# Patient Record
Sex: FEMALE | Race: WHITE | NOT HISPANIC OR LATINO | Employment: OTHER | URBAN - METROPOLITAN AREA
[De-identification: names, ages, dates, MRNs, and addresses within clinical notes are randomized per-mention and may not be internally consistent; named-entity substitution may affect disease eponyms.]

---

## 2017-11-07 ENCOUNTER — HOSPITAL ENCOUNTER (OUTPATIENT)
Dept: FAMILY MEDICINE CLINIC | Facility: CLINIC | Age: 77
Setting detail: SPECIMEN
Discharge: HOME | End: 2017-11-07
Attending: FAMILY MEDICINE | Admitting: FAMILY MEDICINE

## 2017-12-11 ENCOUNTER — HOSPITAL ENCOUNTER (OUTPATIENT)
Dept: ONCOLOGY | Facility: HOSPITAL | Age: 77
Discharge: HOME | End: 2017-12-11
Attending: FAMILY MEDICINE | Admitting: FAMILY MEDICINE

## 2018-04-24 ENCOUNTER — HOSPITAL ENCOUNTER (OUTPATIENT)
Dept: FAMILY MEDICINE CLINIC | Facility: CLINIC | Age: 78
Setting detail: SPECIMEN
Discharge: HOME | End: 2018-04-24
Attending: FAMILY MEDICINE | Admitting: FAMILY MEDICINE

## 2018-04-24 LAB
ALBUMIN SERPL-MCNC: 4 G/DL (ref 3.5–4.8)
ALBUMIN/GLOB SERPL: 1.1 {RATIO} (ref 1–1.7)
ALP SERPL-CCNC: 64 IU/L (ref 32–91)
ALT SERPL-CCNC: 22 IU/L (ref 14–54)
ANION GAP SERPL CALC-SCNC: 14.4 MMOL/L (ref 10–20)
AST SERPL-CCNC: 26 IU/L (ref 15–41)
BASOPHILS # BLD AUTO: 0.1 10*3/UL (ref 0–0.2)
BASOPHILS NFR BLD AUTO: 1 % (ref 0–2)
BILIRUB SERPL-MCNC: 0.4 MG/DL (ref 0.3–1.2)
BUN SERPL-MCNC: 19 MG/DL (ref 8–20)
BUN/CREAT SERPL: 21.1 (ref 5.4–26.2)
CALCIUM SERPL-MCNC: 9.8 MG/DL (ref 8.9–10.3)
CHLORIDE SERPL-SCNC: 103 MMOL/L (ref 101–111)
CONV CO2: 26 MMOL/L (ref 22–32)
CONV TOTAL PROTEIN: 7.6 G/DL (ref 6.1–7.9)
CREAT UR-MCNC: 0.9 MG/DL (ref 0.4–1)
DIFFERENTIAL METHOD BLD: (no result)
EOSINOPHIL # BLD AUTO: 0.1 10*3/UL (ref 0–0.3)
EOSINOPHIL # BLD AUTO: 1 % (ref 0–3)
ERYTHROCYTE [DISTWIDTH] IN BLOOD BY AUTOMATED COUNT: 15.5 % (ref 11.5–14.5)
GLOBULIN UR ELPH-MCNC: 3.6 G/DL (ref 2.5–3.8)
GLUCOSE SERPL-MCNC: 100 MG/DL (ref 65–99)
HCT VFR BLD AUTO: 43.5 % (ref 35–49)
HGB BLD-MCNC: 13.6 G/DL (ref 12–15)
LYMPHOCYTES # BLD AUTO: 2.8 10*3/UL (ref 0.8–4.8)
LYMPHOCYTES NFR BLD AUTO: 30 % (ref 18–42)
MCH RBC QN AUTO: 31.3 PG (ref 26–32)
MCHC RBC AUTO-ENTMCNC: 31.2 G/DL (ref 32–36)
MCV RBC AUTO: 100.4 FL (ref 80–94)
MONOCYTES # BLD AUTO: 1 10*3/UL (ref 0.1–1.3)
MONOCYTES NFR BLD AUTO: 11 % (ref 2–11)
NEUTROPHILS # BLD AUTO: 5.5 10*3/UL (ref 2.3–8.6)
NEUTROPHILS NFR BLD AUTO: 57 % (ref 50–75)
NRBC BLD AUTO-RTO: 0 /100{WBCS}
NRBC/RBC NFR BLD MANUAL: 0 10*3/UL
PLATELET # BLD AUTO: 287 10*3/UL (ref 150–450)
PMV BLD AUTO: 9.4 FL (ref 7.4–10.4)
POTASSIUM SERPL-SCNC: 4.4 MMOL/L (ref 3.6–5.1)
RBC # BLD AUTO: 4.33 10*6/UL (ref 4–5.4)
SODIUM SERPL-SCNC: 139 MMOL/L (ref 136–144)
WBC # BLD AUTO: 9.5 10*3/UL (ref 4.5–11.5)

## 2018-05-25 ENCOUNTER — HOSPITAL ENCOUNTER (OUTPATIENT)
Dept: CARDIOLOGY | Facility: HOSPITAL | Age: 78
Discharge: HOME | End: 2018-05-25
Attending: INTERNAL MEDICINE | Admitting: INTERNAL MEDICINE

## 2018-06-08 ENCOUNTER — HOSPITAL ENCOUNTER (OUTPATIENT)
Dept: PREADMISSION TESTING | Facility: HOSPITAL | Age: 78
Discharge: HOME | End: 2018-06-08
Attending: INTERNAL MEDICINE | Admitting: INTERNAL MEDICINE

## 2018-06-08 LAB
ANION GAP SERPL CALC-SCNC: 9.9 MMOL/L (ref 10–20)
APTT BLD: 25.5 SEC (ref 24–31)
BASOPHILS # BLD AUTO: 0.1 10*3/UL (ref 0–0.2)
BASOPHILS NFR BLD AUTO: 1 % (ref 0–2)
BUN SERPL-MCNC: 19 MG/DL (ref 8–20)
BUN/CREAT SERPL: 21.1 (ref 5.4–26.2)
CALCIUM SERPL-MCNC: 9.2 MG/DL (ref 8.9–10.3)
CHLORIDE SERPL-SCNC: 104 MMOL/L (ref 101–111)
CHOLEST SERPL-MCNC: 162 MG/DL
CHOLEST/HDLC SERPL: 2.8 {RATIO}
CONV CO2: 24 MMOL/L (ref 22–32)
CONV LDL CHOLESTEROL DIRECT: 86 MG/DL (ref 0–100)
CREAT UR-MCNC: 0.9 MG/DL (ref 0.4–1)
DIFFERENTIAL METHOD BLD: (no result)
EOSINOPHIL # BLD AUTO: 0 % (ref 0–3)
EOSINOPHIL # BLD AUTO: 0 10*3/UL (ref 0–0.3)
ERYTHROCYTE [DISTWIDTH] IN BLOOD BY AUTOMATED COUNT: 14.6 % (ref 11.5–14.5)
GLUCOSE SERPL-MCNC: 114 MG/DL (ref 65–99)
HCT VFR BLD AUTO: 39.3 % (ref 35–49)
HDLC SERPL-MCNC: 57 MG/DL
HGB BLD-MCNC: 13 G/DL (ref 12–15)
INR PPP: 0.9
LDLC/HDLC SERPL: 1.5 {RATIO}
LIPID INTERPRETATION: NORMAL
LYMPHOCYTES # BLD AUTO: 2 10*3/UL (ref 0.8–4.8)
LYMPHOCYTES NFR BLD AUTO: 25 % (ref 18–42)
MCH RBC QN AUTO: 31 PG (ref 26–32)
MCHC RBC AUTO-ENTMCNC: 33 G/DL (ref 32–36)
MCV RBC AUTO: 93.9 FL (ref 80–94)
MONOCYTES # BLD AUTO: 0.8 10*3/UL (ref 0.1–1.3)
MONOCYTES NFR BLD AUTO: 10 % (ref 2–11)
NEUTROPHILS # BLD AUTO: 5 10*3/UL (ref 2.3–8.6)
NEUTROPHILS NFR BLD AUTO: 64 % (ref 50–75)
NRBC BLD AUTO-RTO: 0 /100{WBCS}
NRBC/RBC NFR BLD MANUAL: 0 10*3/UL
PLATELET # BLD AUTO: 271 10*3/UL (ref 150–450)
PMV BLD AUTO: 8.9 FL (ref 7.4–10.4)
POTASSIUM SERPL-SCNC: 3.9 MMOL/L (ref 3.6–5.1)
PROTHROMBIN TIME: 10 SEC (ref 9.6–11.7)
RBC # BLD AUTO: 4.19 10*6/UL (ref 4–5.4)
SODIUM SERPL-SCNC: 134 MMOL/L (ref 136–144)
TRIGL SERPL-MCNC: 94 MG/DL
VLDLC SERPL CALC-MCNC: 19.3 MG/DL
WBC # BLD AUTO: 7.9 10*3/UL (ref 4.5–11.5)

## 2018-11-26 ENCOUNTER — HOSPITAL ENCOUNTER (OUTPATIENT)
Dept: ONCOLOGY | Facility: HOSPITAL | Age: 78
Discharge: HOME | End: 2018-11-26
Attending: FAMILY MEDICINE | Admitting: FAMILY MEDICINE

## 2019-03-20 ENCOUNTER — HOSPITAL ENCOUNTER (OUTPATIENT)
Dept: ONCOLOGY | Facility: HOSPITAL | Age: 79
Discharge: HOME | End: 2019-03-20
Attending: FAMILY MEDICINE | Admitting: FAMILY MEDICINE

## 2019-06-01 ENCOUNTER — TRANSCRIBE ORDERS (OUTPATIENT)
Dept: PET IMAGING | Facility: HOSPITAL | Age: 79
End: 2019-06-01

## 2019-06-01 ENCOUNTER — TRANSCRIBE ORDERS (OUTPATIENT)
Dept: RADIATION ONCOLOGY | Facility: HOSPITAL | Age: 79
End: 2019-06-01

## 2019-06-01 DIAGNOSIS — R91.1 PULMONARY NODULE: Primary | ICD-10-CM

## 2019-06-01 DIAGNOSIS — R91.1 LUNG NODULE: Primary | ICD-10-CM

## 2019-06-01 DIAGNOSIS — IMO0001 LUNG NODULE < 6CM ON CT: Primary | ICD-10-CM

## 2019-06-20 ENCOUNTER — HOSPITAL ENCOUNTER (OUTPATIENT)
Dept: PET IMAGING | Facility: HOSPITAL | Age: 79
Discharge: HOME | End: 2019-06-20
Admitting: THORACIC SURGERY (CARDIOTHORACIC VASCULAR SURGERY)

## 2019-06-20 DIAGNOSIS — IMO0001 LUNG NODULE < 6CM ON CT: ICD-10-CM

## 2019-06-20 PROCEDURE — 71250 CT THORAX DX C-: CPT

## 2019-06-27 ENCOUNTER — OFFICE VISIT (OUTPATIENT)
Dept: SURGERY | Facility: CLINIC | Age: 79
End: 2019-06-27

## 2019-06-27 VITALS
DIASTOLIC BLOOD PRESSURE: 72 MMHG | BODY MASS INDEX: 39.07 KG/M2 | HEART RATE: 104 BPM | TEMPERATURE: 97 F | WEIGHT: 233 LBS | OXYGEN SATURATION: 92 % | SYSTOLIC BLOOD PRESSURE: 143 MMHG

## 2019-06-27 DIAGNOSIS — R91.8 MULTIPLE PULMONARY NODULES: Primary | ICD-10-CM

## 2019-06-27 DIAGNOSIS — E66.01 MORBID (SEVERE) OBESITY DUE TO EXCESS CALORIES (HCC): ICD-10-CM

## 2019-06-27 DIAGNOSIS — R06.02 SHORTNESS OF BREATH: ICD-10-CM

## 2019-06-27 PROBLEM — E04.1 THYROID NODULE: Status: ACTIVE | Noted: 2017-12-11

## 2019-06-27 PROBLEM — M19.90 ARTHRITIS: Status: ACTIVE | Noted: 2019-06-27

## 2019-06-27 PROBLEM — E66.3 OVERWEIGHT: Status: ACTIVE | Noted: 2017-12-15

## 2019-06-27 PROBLEM — K76.0 FATTY LIVER: Status: ACTIVE | Noted: 2019-03-28

## 2019-06-27 PROBLEM — R53.83 FATIGUE: Status: ACTIVE | Noted: 2018-04-24

## 2019-06-27 PROBLEM — I10 HYPERTENSION: Status: ACTIVE | Noted: 2019-06-27

## 2019-06-27 PROBLEM — IMO0002 DEGENERATION OF INTERVERTEBRAL DISC: Status: ACTIVE | Noted: 2019-06-27

## 2019-06-27 PROBLEM — Z78.0 POSTMENOPAUSAL STATUS: Status: ACTIVE | Noted: 2017-11-07

## 2019-06-27 PROBLEM — F17.201 TOBACCO DEPENDENCE IN REMISSION: Status: ACTIVE | Noted: 2017-11-07

## 2019-06-27 PROBLEM — F32.A DEPRESSION: Status: ACTIVE | Noted: 2019-06-27

## 2019-06-27 PROCEDURE — 99214 OFFICE O/P EST MOD 30 MIN: CPT | Performed by: THORACIC SURGERY (CARDIOTHORACIC VASCULAR SURGERY)

## 2019-06-27 RX ORDER — TEMAZEPAM 30 MG/1
CAPSULE ORAL
COMMUNITY
Start: 2019-06-03 | End: 2019-09-23 | Stop reason: SDUPTHER

## 2019-06-27 RX ORDER — DULOXETIN HYDROCHLORIDE 60 MG/1
CAPSULE, DELAYED RELEASE ORAL
COMMUNITY
Start: 2019-05-13 | End: 2019-10-21

## 2019-06-27 RX ORDER — CHLORAL HYDRATE 500 MG
1000 CAPSULE ORAL
COMMUNITY
End: 2021-10-28

## 2019-06-27 RX ORDER — LISINOPRIL 20 MG/1
TABLET ORAL
COMMUNITY
Start: 2019-06-20 | End: 2019-11-27 | Stop reason: SDUPTHER

## 2019-06-27 RX ORDER — SULFASALAZINE 500 MG/1
TABLET, DELAYED RELEASE ORAL
COMMUNITY
Start: 2019-05-06 | End: 2019-07-05 | Stop reason: SDUPTHER

## 2019-06-27 RX ORDER — DILTIAZEM HYDROCHLORIDE 180 MG/1
CAPSULE, EXTENDED RELEASE ORAL
COMMUNITY
Start: 2019-05-13 | End: 2019-11-06 | Stop reason: SDUPTHER

## 2019-06-27 RX ORDER — LATANOPROST 50 UG/ML
SOLUTION/ DROPS OPHTHALMIC EVERY 24 HOURS
COMMUNITY
Start: 2019-04-04 | End: 2019-10-21

## 2019-06-27 NOTE — PROGRESS NOTES
Patient is seen in follow-up of her right middle lobe pulmonary nodule.  Chief complaint right middle lobe pulmonary nodule  Patient is a former smoker.  She is now smoke-free.  She returns with a 3-month interval follow-up CT scan that shows no change in the right middle lobe pulmonary nodule.  There are no other significant findings on the CT scan.  She has a calcified cyst at the left apex.  I personally examined the CT scan and reviewed the radiology report.  Patient also has morbid obesity and she is lost 20 pounds since her last visit.  She is engaged in a weight loss program.  She is exercising regularly.  She walked up to 4 hours within the last 2 weeks.  She has no fevers chills night sweats cough hemoptysis sputum production.  No headaches or long bone pain.  Review of systems no head neck chest or abdominal pain  I personally examined the CT scan and reviewed the radiology report.  I reviewed the CT scan with the patient as well.  On physical examination she is well-appearing in no distress sclera anicteric conjunctiva pink perfusion is good.    1.  Pulmonary nodule right middle lobe stable in size #2 morbid obesity losing weight increasing physical activity #3 former smoker encouraged to stay smoke-free  Plan follow-up in 9 months with repeat CT scan.

## 2019-07-08 RX ORDER — SULFASALAZINE 500 MG/1
1500 TABLET, DELAYED RELEASE ORAL 2 TIMES DAILY
Qty: 540 TABLET | Refills: 0 | Status: SHIPPED | OUTPATIENT
Start: 2019-07-08 | End: 2019-10-08 | Stop reason: SDUPTHER

## 2019-08-26 ENCOUNTER — CONVERSION ENCOUNTER (OUTPATIENT)
Dept: SURGERY | Facility: CLINIC | Age: 79
End: 2019-08-26

## 2019-08-26 LAB
ALBUMIN SERPL-MCNC: 4.3 G/DL (ref 3.6–5.1)
ALBUMIN/GLOB SERPL: ABNORMAL {RATIO} (ref 1–2.5)
ALP SERPL-CCNC: 58 UNITS/L (ref 33–130)
ALT SERPL-CCNC: 14 UNITS/L (ref 6–29)
AST SERPL-CCNC: 18 UNITS/L (ref 10–35)
BASOPHILS # BLD AUTO: ABNORMAL 10*3/MM3 (ref 0–200)
BASOPHILS NFR BLD AUTO: 0.6 %
BILIRUB SERPL-MCNC: 0.4 MG/DL (ref 0.2–1.2)
BUN SERPL-MCNC: 17 MG/DL (ref 7–25)
BUN/CREAT SERPL: ABNORMAL (ref 6–22)
CALCIUM SERPL-MCNC: 10 MG/DL (ref 8.6–10.4)
CHLORIDE SERPL-SCNC: 103 MMOL/L (ref 98–110)
CO2 CONTENT VENOUS: 26 MMOL/L (ref 20–32)
CONV NEUTROPHILS/100 LEUKOCYTES IN BODY FLUID BY MANUAL COUNT: 62.6 %
CONV TOTAL PROTEIN: 7.1 G/DL (ref 6.1–8.1)
CREAT UR-MCNC: 0.95 MG/DL (ref 0.6–0.93)
CRP SERPL-MCNC: 1.04 MG/DL
EOSINOPHIL # BLD AUTO: 1 %
EOSINOPHIL # BLD AUTO: ABNORMAL 10*3/MM3 (ref 15–500)
ERYTHROCYTE [DISTWIDTH] IN BLOOD BY AUTOMATED COUNT: 13.6 % (ref 11–15)
GLOBULIN UR ELPH-MCNC: ABNORMAL G/DL (ref 1.9–3.7)
GLUCOSE SERPL-MCNC: 97 MG/DL (ref 65–139)
HCT VFR BLD AUTO: 39.5 % (ref 35–45)
HGB BLD-MCNC: 12.9 G/DL (ref 11.7–15.5)
LYMPHOCYTES # BLD AUTO: ABNORMAL 10*3/MM3 (ref 850–3900)
LYMPHOCYTES NFR BLD AUTO: 25.6 %
MCH RBC QN AUTO: 30.1 PG (ref 27–33)
MCHC RBC AUTO-ENTMCNC: ABNORMAL % (ref 32–36)
MCV RBC AUTO: 92.3 FL (ref 80–100)
MONOCYTES # BLD AUTO: ABNORMAL 10*3/MICROLITER (ref 200–950)
MONOCYTES NFR BLD AUTO: 10.2 %
NEUTROPHILS # BLD AUTO: ABNORMAL 10*3/MM3 (ref 1500–7800)
PLATELET # BLD AUTO: ABNORMAL 10*3/MM3 (ref 140–400)
PMV BLD AUTO: 10.7 FL (ref 7.5–12.5)
POTASSIUM SERPL-SCNC: 4.6 MMOL/L (ref 3.5–5.3)
RBC # BLD AUTO: ABNORMAL 10*6/MM3 (ref 3.8–5.1)
SODIUM SERPL-SCNC: 138 MMOL/L (ref 135–146)
WBC # BLD AUTO: ABNORMAL K/UL (ref 3.8–10.8)

## 2019-09-09 ENCOUNTER — LAB (OUTPATIENT)
Dept: LAB | Facility: HOSPITAL | Age: 79
End: 2019-09-09

## 2019-09-09 ENCOUNTER — OFFICE VISIT (OUTPATIENT)
Dept: RHEUMATOLOGY | Facility: CLINIC | Age: 79
End: 2019-09-09

## 2019-09-09 VITALS
BODY MASS INDEX: 36.88 KG/M2 | DIASTOLIC BLOOD PRESSURE: 80 MMHG | HEIGHT: 64 IN | HEART RATE: 96 BPM | WEIGHT: 216 LBS | SYSTOLIC BLOOD PRESSURE: 124 MMHG

## 2019-09-09 DIAGNOSIS — R91.8 MULTIPLE PULMONARY NODULES: ICD-10-CM

## 2019-09-09 DIAGNOSIS — E04.1 THYROID NODULE: ICD-10-CM

## 2019-09-09 DIAGNOSIS — M06.4 INFLAMMATORY POLYARTHROPATHY (HCC): Primary | ICD-10-CM

## 2019-09-09 DIAGNOSIS — M89.9 DISORDER OF BONE: ICD-10-CM

## 2019-09-09 DIAGNOSIS — M06.4 INFLAMMATORY POLYARTHROPATHY (HCC): ICD-10-CM

## 2019-09-09 PROCEDURE — 99213 OFFICE O/P EST LOW 20 MIN: CPT | Performed by: NURSE PRACTITIONER

## 2019-09-09 PROCEDURE — 86038 ANTINUCLEAR ANTIBODIES: CPT

## 2019-09-09 PROCEDURE — 36415 COLL VENOUS BLD VENIPUNCTURE: CPT

## 2019-09-09 PROCEDURE — 84165 PROTEIN E-PHORESIS SERUM: CPT

## 2019-09-09 NOTE — PROGRESS NOTES
"Subjective   Leann RICHELLE Calix is a 79 y.o. female.     History of Present Illness     Pt is a pleasant 79 y.o. female pt with history of inflammatory arthritis, she is taking SSZ 500mg 3 tabs po BID. She was last seen in the office on in May 2019.   labs on 8-26-19 show creatinine mildly elevated at 0.95 (0.60-0.93), elevated CRP at 10.4 (<8.0), CBC unremarkable.   Vectra on 1-28-19 was 47. ACE-1 level was low at 5.  She denies any stiffness, joint pain or swelling. Claims her joints \"feel fine.\" She has lost nearly 30 lbs w/ the HMR program and feels great. Tries to walk daily & walked 3 miles yesterday without difficulty. Denies any SOA, reports this is \" all better since losing weight.\"     Review of systems:Denies fever or chills. No sores in the  mouth or nose. She has dry eyes and uses gtts as needed.. No skin rashes or psoriasis. denies chest pain.  Denies SOA-->hx of pulm nodules; had CT scan and is under the care of her pulmonologist. Claims recent CT showed no change and will do another CT in 4-6 months.   . Denies ADELINE, jaw pain or blurred vision.  Has thyroid nodules and follows ENT; she had biopsy rand eports her biopsy was benign. Denies N/V/D.The remainder of the review of systems reviewed and is (-). Taking duloxetine for her fibromyalgia and this does help. Colonscopy 2 yrs ago & reports it was normal.     Sees Marylou for her PVCs & HTN. She had catherization and reports \"it was clear.\"         The following portions of the patient's history were reviewed and updated as appropriate: allergies, current medications, past family history, past medical history, past social history, past surgical history and problem list.    Review of Systems   Constitutional:        See HPI       Objective   Physical Exam   Constitutional: She is oriented to person, place, and time. She appears well-developed and well-nourished.   HENT:   Head: Normocephalic.   Nose: Nose normal.   Eyes: Conjunctivae and EOM are normal. " Pupils are equal, round, and reactive to light.   Cardiovascular: Normal rate.   Pulmonary/Chest: Effort normal and breath sounds normal. No respiratory distress. She has no wheezes. She exhibits no tenderness.   Musculoskeletal:   Full ROM; she has equal  strength bilaterally (-) squeeze tenderness.  She is non tender on examination  No swelling is noted on examination.    Neurological: She is alert and oriented to person, place, and time.   Skin: Skin is warm and dry. Capillary refill takes less than 2 seconds. No rash noted.   Psychiatric: She has a normal mood and affect.   Vitals reviewed.        Assessment/Plan   June was seen today for joint pain.    Diagnoses and all orders for this visit:    Inflammatory polyarthropathy (CMS/HCC)  -     Protein Elec + Interp, Serum; Future  -     JUNIOR by IFA, Reflex 9-biomarkers profile; Future  -     CBC With Manual Differential; Future  -     Comprehensive Metabolic Panel; Future  -     C-reactive Protein; Future  -     Sedimentation Rate; Future  -     Cyclic Citrul Peptide Antibody, IgG / IgA; Future  -     Rheumatoid Factor; Future  -     Vitamin D 25 Hydroxy; Future    Disorder of bone   -     Vitamin D 25 Hydroxy; Future  -     DEXA Bone Density Axial; Future        Patient Instructions   Doing wee  lsot wt-->feels better  Continue meds  No s/s of active disease  RTO 3 months or sooner if needed

## 2019-09-11 LAB — ANA SER QL: NEGATIVE

## 2019-09-13 LAB
ALBUMIN SERPL ELPH-MCNC: 3.6 G/DL (ref 3.5–4.8)
ALPHA-1-GLOBULIN: 0.2 GM/DL (ref 0.1–0.4)
ALPHA-2-GLOBULIN: 0.7 GM/DL (ref 0.5–1)
BETA GLOBULIN: 1.1 GM/DL (ref 0.7–1.4)
GAMMA GLOBULIN: 1.1 GM/DL (ref 0.6–1.6)
PROT PATTERN SERPL ELPH-IMP: NORMAL
PROT SERPL-MCNC: 6.8 G/DL (ref 6.1–7.9)

## 2019-09-24 RX ORDER — TEMAZEPAM 30 MG/1
CAPSULE ORAL
Qty: 30 CAPSULE | Refills: 5 | Status: SHIPPED | OUTPATIENT
Start: 2019-09-24 | End: 2020-01-15 | Stop reason: SDUPTHER

## 2019-10-08 RX ORDER — SULFASALAZINE 500 MG/1
1500 TABLET, DELAYED RELEASE ORAL 2 TIMES DAILY
Qty: 540 TABLET | Refills: 0 | Status: SHIPPED | OUTPATIENT
Start: 2019-10-08 | End: 2019-10-28 | Stop reason: SDUPTHER

## 2019-10-21 ENCOUNTER — OFFICE VISIT (OUTPATIENT)
Dept: CARDIOLOGY | Facility: CLINIC | Age: 79
End: 2019-10-21

## 2019-10-21 VITALS
SYSTOLIC BLOOD PRESSURE: 134 MMHG | OXYGEN SATURATION: 92 % | HEIGHT: 64 IN | DIASTOLIC BLOOD PRESSURE: 79 MMHG | BODY MASS INDEX: 35.17 KG/M2 | WEIGHT: 206 LBS | HEART RATE: 80 BPM

## 2019-10-21 DIAGNOSIS — I10 ESSENTIAL HYPERTENSION: Primary | ICD-10-CM

## 2019-10-21 DIAGNOSIS — E78.00 PURE HYPERCHOLESTEROLEMIA: ICD-10-CM

## 2019-10-21 PROCEDURE — 99213 OFFICE O/P EST LOW 20 MIN: CPT | Performed by: INTERNAL MEDICINE

## 2019-10-21 NOTE — PROGRESS NOTES
Subjective:     Encounter Date:10/21/2019      Patient ID: Leann Calix is a 79 y.o. female.    Chief Complaint:  History of Present Illness 79-year-old white female with history of hypertension hyperlipidemia and history of arthritis presents to my office for follow-up.  Patient was in my office in the past because of shortness of breath and her blood pressure was not very well controlled.  Patient medications have been adjusted and since then she is doing well.  Patient does not have any symptoms of chest pain or shortness of breath at rest or exertion.  No complaints of any PND orthopnea.  No palpitations dizziness syncope or swelling of the feet.  She is taking her medicines regularly.  She states that she lost about 40 to 50 pounds of weight in the last 1 year.  She is following a good diet.    The following portions of the patient's history were reviewed and updated as appropriate: allergies, current medications, past family history, past medical history, past social history, past surgical history and problem list.  Past Medical History:   Diagnosis Date   • Arthritis    • Back pain     LOW RADICULAR PAIN LEFT LEG   • Cataract    • Chronic insomnia    • Depression    • Fatty liver    • H/O degenerative disc disease    • Hypertension     DR ONEAL   • Pre-diabetes    • Sleep apnea     USES CPAP   • Uterine cancer (CMS/HCC) 1984     Past Surgical History:   Procedure Laterality Date   • BREAST BIOPSY  1970   • CARDIAC CATHETERIZATION  06/2018    NL DR ONEAL   • CHOLECYSTECTOMY  1996   • HIP ARTHROPLASTY Right 06/07/2010    DR PACKER   • HYSTERECTOMY  1984    CARCINOMA IN SITU   • HYSTERECTOMY  01/01/1984   • LUMBAR DISCECTOMY  2006    DR VOGEL   • LUMBAR FUSION  2002    L3/5 DR PAPPAS   • LUMBAR LAMINECTOMY  1989    LUMBAR SPINAL FUSION L4/L5 DR BRUCE   • OOPHORECTOMY Right 1996   • PAROTIDECTOMY Right 2004   • PARTIAL HIP ARTHROPLASTY  03/16/2017    ALICIA MICHEL   • REFRACTIVE SURGERY     •  "REVISION TOTAL KNEE ARTHROPLASTY Left 03/16/2017    DR MICHEL   • ROTATOR CUFF REPAIR Right 2003   • THYROID BIOPSY  01/2018    X5   • TOTAL KNEE ARTHROPLASTY  2009    DR MICHEL   • TUBAL ABDOMINAL LIGATION Bilateral 1976     /79 (BP Location: Left arm, Patient Position: Sitting)   Pulse 80   Ht 162.6 cm (64\")   Wt 93.4 kg (206 lb)   SpO2 92%   BMI 35.36 kg/m²   Family History   Problem Relation Age of Onset   • Cancer Mother    • Kidney disease Mother    • Thyroid disease Sister    • Allergies Sister    • Allergies Brother    • Cancer Daughter    • Other Daughter         EWINGS SARCOMA   • Cancer Other        Current Outpatient Medications:   •  CARTIA  MG 24 hr capsule, , Disp: , Rfl:   •  lisinopril (PRINIVIL,ZESTRIL) 20 MG tablet, , Disp: , Rfl:   •  Omega-3 Fatty Acids (FISH OIL) 1000 MG capsule capsule, Take  by mouth Daily With Breakfast., Disp: , Rfl:   •  sulfaSALAzine (AZULFIDINE) 500 MG EC tablet, TAKE 3 TABLETS BY MOUTH 2 (TWO) TIMES A DAY., Disp: 540 tablet, Rfl: 0  •  temazepam (RESTORIL) 30 MG capsule, TAKE 1 CAPSULE AT BEDTIME AS NEEDED FOR SLEEP, Disp: 30 capsule, Rfl: 5  •  Unable to find, CPAP MACHINE, Disp: , Rfl:   •  Unable to find, HMR MULTIVITAMIN, Disp: , Rfl:   Allergies   Allergen Reactions   • Beta Adrenergic Blockers Unknown (See Comments)     Patient states they are allergic.  Did not know reaction type   • Bisoprolol Fumarate Hives     Social History     Socioeconomic History   • Marital status:      Spouse name: Not on file   • Number of children: Not on file   • Years of education: Not on file   • Highest education level: Not on file   Tobacco Use   • Smoking status: Former Smoker     Types: Cigarettes   • Smokeless tobacco: Never Used   Substance and Sexual Activity   • Alcohol use: No     Frequency: Never   • Drug use: No     Review of Systems   Constitution: Negative for fever and malaise/fatigue.   Cardiovascular: Negative for chest pain, dyspnea on " exertion and palpitations.   Respiratory: Negative for cough and shortness of breath.    Skin: Negative for rash.   Gastrointestinal: Negative for abdominal pain, nausea and vomiting.   Neurological: Negative for focal weakness and headaches.   All other systems reviewed and are negative.             Objective:     Physical Exam   Constitutional: She appears well-developed and well-nourished.   HENT:   Head: Normocephalic and atraumatic.   Eyes: Conjunctivae are normal. No scleral icterus.   Neck: Normal range of motion. Neck supple. No JVD present. Carotid bruit is not present.   Cardiovascular: Normal rate, regular rhythm, S1 normal, S2 normal, normal heart sounds and intact distal pulses. PMI is not displaced.   Pulmonary/Chest: Effort normal and breath sounds normal. She has no wheezes. She has no rales.   Abdominal: Soft. Bowel sounds are normal.   Neurological: She is alert. She has normal strength.   Skin: Skin is warm and dry. No rash noted.     Procedures    Lab Review:       Assessment:          Diagnosis Plan   1. Essential hypertension     2. Pure hypercholesterolemia            Plan:       Patient blood pressure and heart rate are stable.  She is currently on lisinopril and Cartia XT  Patient does not have any symptoms of palpitations.  Patient's lipid levels are followed by primary care doctor and she has slightly elevated total cholesterol and LDL  Patient will be followed by the primary care doctor and that her lipid levels will be addressed by her.  Continue current medical therapy and follow in 1 year

## 2019-10-28 RX ORDER — SULFASALAZINE 500 MG/1
1500 TABLET, DELAYED RELEASE ORAL 2 TIMES DAILY
Qty: 540 TABLET | Refills: 0 | Status: SHIPPED | OUTPATIENT
Start: 2019-10-28 | End: 2019-12-05 | Stop reason: SDUPTHER

## 2019-10-28 RX ORDER — LISINOPRIL 20 MG/1
TABLET ORAL
Qty: 90 TABLET | Refills: 2 | OUTPATIENT
Start: 2019-10-28

## 2019-11-06 ENCOUNTER — PATIENT MESSAGE (OUTPATIENT)
Dept: CARDIOLOGY | Facility: CLINIC | Age: 79
End: 2019-11-06

## 2019-11-06 RX ORDER — DILTIAZEM HYDROCHLORIDE 180 MG/1
180 CAPSULE, EXTENDED RELEASE ORAL DAILY
Qty: 90 CAPSULE | Refills: 1 | Status: SHIPPED | OUTPATIENT
Start: 2019-11-06 | End: 2019-11-14 | Stop reason: SDUPTHER

## 2019-11-06 NOTE — TELEPHONE ENCOUNTER
From: Leann Calix  To: Thuan Hickman MD  Sent: 11/6/2019 8:10 AM EST  Subject: Prescription Question    [employer] ask me to tell Dr. Hickman I need a new prescription for my Diltiazem so they can fill it for 3 months/90 days. They have not heard from the office/doctor.    Leann Calix

## 2019-11-13 ENCOUNTER — PATIENT MESSAGE (OUTPATIENT)
Dept: CARDIOLOGY | Facility: CLINIC | Age: 79
End: 2019-11-13

## 2019-11-14 RX ORDER — DILTIAZEM HYDROCHLORIDE 180 MG/1
180 CAPSULE, EXTENDED RELEASE ORAL DAILY
Qty: 90 CAPSULE | Refills: 1 | Status: SHIPPED | OUTPATIENT
Start: 2019-11-14 | End: 2020-09-14 | Stop reason: SDUPTHER

## 2019-11-14 NOTE — TELEPHONE ENCOUNTER
From: Leann Calix  To: Thuan Hickman MD  Sent: 11/13/2019 11:21 AM EST  Subject: Prescription Question    [facility] [facility] asked me again to contact you about my prescription for Diltiazem  mg. (90 days). Prescription said unknown product. Will you please take care of this for me?    Leann Calix [birth year] is birthdate.

## 2019-11-15 ENCOUNTER — OFFICE VISIT (OUTPATIENT)
Dept: FAMILY MEDICINE CLINIC | Facility: CLINIC | Age: 79
End: 2019-11-15

## 2019-11-15 VITALS
SYSTOLIC BLOOD PRESSURE: 141 MMHG | BODY MASS INDEX: 34.66 KG/M2 | HEART RATE: 85 BPM | WEIGHT: 203 LBS | OXYGEN SATURATION: 93 % | DIASTOLIC BLOOD PRESSURE: 64 MMHG | TEMPERATURE: 98.1 F | HEIGHT: 64 IN

## 2019-11-15 DIAGNOSIS — E04.1 THYROID NODULE: ICD-10-CM

## 2019-11-15 DIAGNOSIS — K76.0 FATTY LIVER: ICD-10-CM

## 2019-11-15 DIAGNOSIS — Z12.39 SCREENING FOR BREAST CANCER: ICD-10-CM

## 2019-11-15 DIAGNOSIS — Z00.00 MEDICARE ANNUAL WELLNESS VISIT, SUBSEQUENT: Primary | ICD-10-CM

## 2019-11-15 DIAGNOSIS — Z12.31 ENCOUNTER FOR SCREENING MAMMOGRAM FOR MALIGNANT NEOPLASM OF BREAST: ICD-10-CM

## 2019-11-15 PROCEDURE — G0439 PPPS, SUBSEQ VISIT: HCPCS | Performed by: FAMILY MEDICINE

## 2019-11-15 NOTE — PATIENT INSTRUCTIONS
Medicare Wellness  Personal Prevention Plan of Service     Date of Office Visit:  11/15/2019  Encounter Provider:  Ryann Simon MD  Place of Service:  [facility]  Patient Name: Leann Calix  :  1940    As part of the Medicare Wellness portion of your visit today, we are providing you with this personalized preventive plan of services (PPPS). This plan is based upon recommendations of the [place] Preventive Services Task Force (USPSTF) and the Advisory Committee on Immunization Practices (ACIP).    This lists the preventive care services that should be considered, and provides dates of when you are due. Items listed as completed are up-to-date and do not require any further intervention.    Health Maintenance   Topic Date Due   • TDAP/TD VACCINES (1 - Tdap) 1960   • ZOSTER VACCINE (1 of 2) 1991   • MEDICARE ANNUAL WELLNESS  2019   • PNEUMOCOCCAL VACCINES (65+ LOW/MEDIUM RISK) (2 of 2 - PPSV23) 2019   • LIPID PANEL  10/21/2019   • LUNG CANCER SCREENING  2020   • INFLUENZA VACCINE  Completed       Orders Placed This Encounter   Procedures   • Mammo Screening Digital Tomosynthesis Bilateral With CAD     Order Specific Question:   Reason for Exam:     Answer:   screen for breast cancer   • TSH   • Lipid Panel       Return in about 1 year (around 11/15/2020) for Medicare Wellness.

## 2019-11-15 NOTE — PROGRESS NOTES
Medicare Wellness Visit   The ABC's of the Annual Wellness Visit    Chief Complaint   Patient presents with   • Medicare Wellness-subsequent     lab orders, mammo order for Rae. Rad., wants to know if she can get the Shingrix and Tdap vaccines       HPI:  FADY Hutchison-1940, is a 79 y.o. female who presents for Medicare Wellness Visit.    Recent Hospitalizations:  No hospitalization(s) within the last year..    Current Medical Providers:  Patient Care Team:  Ryann Simon MD as PCP - General  Ryann Simon MD as PCP - Family Medicine  Thuan Hickman MD as Consulting Physician (Cardiology)  Eddie Meeks MD as Consulting Physician (Otolaryngology)  Emi Leary APRN as Nurse Practitioner (Nurse Practitioner)    Health Habits and Functional and Cognitive Screening and Depression Screening:  Functional & Cognitive Status 11/15/2019   Do you have difficulty preparing food and eating? No   Do you have difficulty bathing yourself, getting dressed or grooming yourself? No   Do you have difficulty using the toilet? No   Do you have difficulty moving around from place to place? No   Do you have trouble with steps or getting out of a bed or a chair? No   Current Diet Well Balanced Diet   Dental Exam Up to date   Eye Exam Up to date   Exercise (times per week) 5 times per week   Current Exercise Activities Include Walking   Do you need help using the phone?  No   Are you deaf or do you have serious difficulty hearing?  No   Do you need help with transportation? No   Do you need help shopping? No   Do you need help preparing meals?  No   Do you need help with housework?  No   Do you need help with laundry? No   Do you need help taking your medications? No   Do you need help managing money? No   Do you ever drive or ride in a car without wearing a seat belt? No   Have you felt unusual stress, anger or loneliness in the last month? No   Who do you live with? Alone   If you need help, do you have  trouble finding someone available to you? No   Have you been bothered in the last four weeks by sexual problems? No   Do you have difficulty concentrating, remembering or making decisions? No       Compared to one year ago, the patient feels her physical health is better and her mental health is better.    Depression Screen:  PHQ-2/PHQ-9 Depression Screening 11/15/2019   Little interest or pleasure in doing things 0   Feeling down, depressed, or hopeless 0   Trouble falling or staying asleep, or sleeping too much 0   Feeling tired or having little energy 0   Poor appetite or overeating 0   Feeling bad about yourself - or that you are a failure or have let yourself or your family down 0   Trouble concentrating on things, such as reading the newspaper or watching television 0   Moving or speaking so slowly that other people could have noticed. Or the opposite - being so fidgety or restless that you have been moving around a lot more than usual 0   Thoughts that you would be better off dead, or of hurting yourself in some way 0   Total Score 0   If you checked off any problems, how difficult have these problems made it for you to do your work, take care of things at home, or get along with other people? Not difficult at all         Past Medical/Family/Social History:  The following portions of the patient's history were reviewed and updated as appropriate: allergies, current medications, past family history, past medical history, past social history, past surgical history and problem list.    Allergies   Allergen Reactions   • Beta Adrenergic Blockers Unknown (See Comments)     Patient states they are allergic.  Did not know reaction type   • Bisoprolol Fumarate Hives         Current Outpatient Medications:   •  CARTIA  MG 24 hr capsule, Take 1 capsule by mouth Daily., Disp: 90 capsule, Rfl: 1  •  lisinopril (PRINIVIL,ZESTRIL) 20 MG tablet, , Disp: , Rfl:   •  Omega-3 Fatty Acids (FISH OIL) 1000 MG capsule  capsule, Take  by mouth Daily With Breakfast., Disp: , Rfl:   •  sulfaSALAzine (AZULFIDINE) 500 MG EC tablet, TAKE 3 TABLETS BY MOUTH 2 (TWO) TIMES A DAY., Disp: 540 tablet, Rfl: 0  •  temazepam (RESTORIL) 30 MG capsule, TAKE 1 CAPSULE AT BEDTIME AS NEEDED FOR SLEEP, Disp: 30 capsule, Rfl: 5  •  Unable to find, CPAP MACHINE, Disp: , Rfl:   •  Unable to find, HMR MULTIVITAMIN, Disp: , Rfl:     Aspirin use counseling: Does not need ASA (and currently is not on it)    Current medication list contains no high risk medications.  No harmful drug interactions have been identified.     Family History   Problem Relation Age of Onset   • Cancer Mother         Don't know where cancer began.   • Kidney disease Mother    • Thyroid disease Sister    • Allergies Sister    • Allergies Brother    • Cancer Daughter         Ewings Sarcoma   • Other Daughter         EWINGS SARCOMA   • Cancer Other    • Alcohol abuse Maternal Grandfather    • Arthritis Maternal Grandmother    • Cancer Maternal Aunt         Breast cancer       Social History     Tobacco Use   • Smoking status: Former Smoker     Packs/day: 1.00     Years: 30.00     Pack years: 30.00     Types: Cigarettes     Start date: 1976     Last attempt to quit: 2006     Years since quittin.4   • Smokeless tobacco: Never Used   Substance Use Topics   • Alcohol use: No     Frequency: Never       Past Surgical History:   Procedure Laterality Date   • BREAST BIOPSY     • CARDIAC CATHETERIZATION  2018    NL DR ONEAL   • CHOLECYSTECTOMY     • COLONOSCOPY      Clean colon found.   • EYE SURGERY Right 10/25/2019    Appenbrink, laser   • HIP ARTHROPLASTY Right 2010    DR PACKER   • HYSTERECTOMY      CARCINOMA IN SITU   • HYSTERECTOMY  1984   • JOINT REPLACEMENT      First knee replacement.   • LUMBAR DISCECTOMY      DR VOGEL   • LUMBAR FUSION      L3/5 DR PAPPAS   • LUMBAR LAMINECTOMY      LUMBAR SPINAL FUSION L4/L5   "JANIS   • OOPHORECTOMY Right 1996   • PAROTIDECTOMY Right 2004   • PARTIAL HIP ARTHROPLASTY  03/16/2017    ALICIA MICHEL   • PARTIAL HYSTERECTOMY  1996    Right ovary removed.   • REVISION TOTAL KNEE ARTHROPLASTY Left 03/16/2017    DR MICHEL   • ROTATOR CUFF REPAIR Right 2003   • SPINE SURGERY  1989    Spondylolisthesis   • THYROID BIOPSY  01/2018    X5   • TOTAL KNEE ARTHROPLASTY  2009    DR MICHEL   • TUBAL ABDOMINAL LIGATION Bilateral 1976       Patient Active Problem List   Diagnosis   • Arthritis   • Inflammatory polyarthropathy (CMS/HCC)   • Hypertension   • Multiple pulmonary nodules   • Lung mass   • Status post hip replacement   • Thyroid nodule   • Degeneration of intervertebral disc   • Depression   • Fatty liver   • Insomnia   • Low back pain   • Lumbosacral radiculopathy   • Lumbar radiculopathy   • Spinal stenosis of lumbar region   • Morbid (severe) obesity due to excess calories (CMS/HCC)   • Overweight   • Postmenopausal status   • Tobacco dependence in remission   • Medicare annual wellness visit, subsequent       Review of Systems   Constitutional: Negative for chills and fever.   HENT: Negative for sinus pressure and sore throat.    Eyes: Negative for blurred vision.   Respiratory: Negative for cough and shortness of breath.    Cardiovascular: Negative for chest pain and palpitations.   Gastrointestinal: Negative for abdominal pain.   Endocrine: Negative for polyuria.   Skin: Negative for rash.   Neurological: Negative for dizziness and headache.   Hematological: Negative for adenopathy.   Psychiatric/Behavioral: Negative for depressed mood.       Objective     Vitals:    11/15/19 1048   BP: 141/64   BP Location: Left arm   Patient Position: Sitting   Cuff Size: Adult   Pulse: 85   Temp: 98.1 °F (36.7 °C)   TempSrc: Oral   SpO2: 93%   Weight: 92.1 kg (203 lb)   Height: 163.2 cm (64.25\")       Patient's Body mass index is 34.57 kg/m². BMI is above normal parameters. Recommendations include: referral " to a weight management program.      No exam data present    The patient has no evidence of cognitve impairment.     Physical Exam   Constitutional: She is oriented to person, place, and time. She appears well-developed. No distress.   HENT:   Head: Normocephalic.   Right Ear: Hearing, tympanic membrane and ear canal normal.   Left Ear: Hearing, tympanic membrane and ear canal normal.   Eyes: Conjunctivae, EOM and lids are normal. Pupils are equal, round, and reactive to light.   Neck: Normal range of motion. Neck supple. No thyroid mass and no thyromegaly present.   Cardiovascular: Normal rate and regular rhythm.   Pulmonary/Chest: Effort normal and breath sounds normal.   Abdominal: Soft. Bowel sounds are normal. There is no tenderness.   Musculoskeletal: Normal range of motion.   Lymphadenopathy:     She has no cervical adenopathy.   Neurological: She is alert and oriented to person, place, and time.   Skin: Skin is warm and dry.   Psychiatric: She has a normal mood and affect. Her speech is normal.   Nursing note and vitals reviewed.      Recent Lab Results:     Lab Results   Component Value Date    CHOL 203 (H) 05/20/2019    TRIG 138 05/20/2019    HDL 62 05/20/2019    LDLHDL 1.5 06/08/2018     No visits with results within 1 Week(s) from this visit.   Latest known visit with results is:   Lab on 09/09/2019   Component Date Value Ref Range Status   • Total Protein 09/09/2019 6.8  6.1 - 7.9 g/dL Final   • Alpha-1-Globulin 09/09/2019 0.2  0.1 - 0.4 gm/dL Final   • Alpha-2-Globulin 09/09/2019 0.7  0.5 - 1 gm/dL Final   • Beta Globulin 09/09/2019 1.1  0.7 - 1.4 gm/dL Final   • Gamma Globulin 09/09/2019 1.1  0.6 - 1.6 gm/dL Final   • SPE CONCLUSION 09/09/2019 Normal Electrophoresis Pattern.     Dr. Zac Cortés     Final   • ALBUMIN SERUM ELECTROPHORESIS 09/09/2019 3.6  3.5 - 4.8 G/DL Final   • Antinuclear Antibodies (JUNIOR) 09/09/2019 Negative  Negative Final         Assessment/Plan   Age-appropriate Screening  Schedule:  Refer to the list below for future screening recommendations based on patient's age, sex and/or medical conditions.      Health Maintenance   Topic Date Due   • TDAP/TD VACCINES (1 - Tdap) 06/30/1960   • ZOSTER VACCINE (1 of 2) 06/30/1991   • PNEUMOCOCCAL VACCINES (65+ LOW/MEDIUM RISK) (2 of 2 - PPSV23) 09/25/2019   • LIPID PANEL  10/21/2019   • INFLUENZA VACCINE  Completed       Medicare Risks and Personalized Health Plan:  Advance Directive Discussion  Breast Cancer/Mammogram Screening  Diabetic Lab Screening   Immunizations Discussed/Encouraged (specific immunizations; adacel Tdap and Shingrix )  Osteoprorosis Risk      CMS-Preventive Services Quick Reference  Medicare Preventive Services Addressed:  Annual Wellness Visit (AWV)  Bone Density Measurements  Diabetes Screening-Lab Order for either glucose quantitative blood (except reagent strip), glucose;post glucose dose(includes glucose), or glucose tolerance test-3 specimens(includes glucose)  Screening Mammography     Advance Care Planning:  Patient has an advance directive - a copy has not been provided. Have asked the patient to send this to us to add to record    Diagnoses and all orders for this visit:    1. Medicare annual wellness visit, subsequent (Primary)  -     Mammo Screening Digital Tomosynthesis Bilateral With CAD  -     TSH  -     Lipid Panel    2. Screening for breast cancer  -     Mammo Screening Digital Tomosynthesis Bilateral With CAD    3. Encounter for screening mammogram for malignant neoplasm of breast   -     Mammo Screening Digital Tomosynthesis Bilateral With CAD    4. Thyroid nodule  -     TSH    5. Fatty liver        An After Visit Summary and PPPS with all of these plans were given to the patient.      Follow Up:  Return in about 1 year (around 11/15/2020) for Medicare Wellness.

## 2019-11-19 RX ORDER — LISINOPRIL 20 MG/1
TABLET ORAL
Qty: 90 TABLET | Refills: 0 | OUTPATIENT
Start: 2019-11-19

## 2019-11-21 ENCOUNTER — LAB (OUTPATIENT)
Dept: LAB | Facility: HOSPITAL | Age: 79
End: 2019-11-21

## 2019-11-21 DIAGNOSIS — M89.9 DISORDER OF BONE: ICD-10-CM

## 2019-11-21 DIAGNOSIS — M06.4 INFLAMMATORY POLYARTHROPATHY (HCC): ICD-10-CM

## 2019-11-21 LAB
25(OH)D3 SERPL-MCNC: 37.9 NG/ML (ref 30–100)
ALBUMIN SERPL-MCNC: 4.6 G/DL (ref 3.5–5.2)
ALBUMIN/GLOB SERPL: 1.2 G/DL
ALP SERPL-CCNC: 65 U/L (ref 39–117)
ALT SERPL W P-5'-P-CCNC: 14 U/L (ref 1–33)
ANION GAP SERPL CALCULATED.3IONS-SCNC: 13.2 MMOL/L (ref 5–15)
AST SERPL-CCNC: 14 U/L (ref 1–32)
BILIRUB SERPL-MCNC: 0.4 MG/DL (ref 0.2–1.2)
BUN BLD-MCNC: 12 MG/DL (ref 8–23)
BUN/CREAT SERPL: 16.7 (ref 7–25)
CALCIUM SPEC-SCNC: 10.3 MG/DL (ref 8.6–10.5)
CHLORIDE SERPL-SCNC: 101 MMOL/L (ref 98–107)
CHOLEST SERPL-MCNC: 190 MG/DL (ref 0–200)
CHROMATIN AB SERPL-ACNC: <10 IU/ML (ref 0–14)
CO2 SERPL-SCNC: 25.8 MMOL/L (ref 22–29)
CREAT BLD-MCNC: 0.72 MG/DL (ref 0.57–1)
CRP SERPL-MCNC: 1.39 MG/DL (ref 0–0.5)
DEPRECATED RDW RBC AUTO: 44.1 FL (ref 37–54)
EOSINOPHIL # BLD MANUAL: 0.06 10*3/MM3 (ref 0–0.4)
EOSINOPHIL NFR BLD MANUAL: 1 % (ref 0.3–6.2)
ERYTHROCYTE [DISTWIDTH] IN BLOOD BY AUTOMATED COUNT: 12.5 % (ref 12.3–15.4)
ERYTHROCYTE [SEDIMENTATION RATE] IN BLOOD: 29 MM/HR (ref 0–30)
GFR SERPL CREATININE-BSD FRML MDRD: 78 ML/MIN/1.73
GLOBULIN UR ELPH-MCNC: 3.8 GM/DL
GLUCOSE BLD-MCNC: 102 MG/DL (ref 65–99)
HCT VFR BLD AUTO: 39.3 % (ref 34–46.6)
HDLC SERPL-MCNC: 59 MG/DL (ref 40–60)
HGB BLD-MCNC: 13.5 G/DL (ref 12–15.9)
LDLC SERPL CALC-MCNC: 112 MG/DL (ref 0–100)
LDLC/HDLC SERPL: 1.89 {RATIO}
LYMPHOCYTES # BLD MANUAL: 1.59 10*3/MM3 (ref 0.7–3.1)
LYMPHOCYTES NFR BLD MANUAL: 26.3 % (ref 19.6–45.3)
LYMPHOCYTES NFR BLD MANUAL: 4 % (ref 5–12)
MCH RBC QN AUTO: 33.5 PG (ref 26.6–33)
MCHC RBC AUTO-ENTMCNC: 34.4 G/DL (ref 31.5–35.7)
MCV RBC AUTO: 97.5 FL (ref 79–97)
MONOCYTES # BLD AUTO: 0.24 10*3/MM3 (ref 0.1–0.9)
NEUTROPHILS # BLD AUTO: 4.16 10*3/MM3 (ref 1.7–7)
NEUTROPHILS NFR BLD MANUAL: 68.7 % (ref 42.7–76)
PLAT MORPH BLD: NORMAL
PLATELET # BLD AUTO: 211 10*3/MM3 (ref 140–450)
PMV BLD AUTO: 11 FL (ref 6–12)
POTASSIUM BLD-SCNC: 4.9 MMOL/L (ref 3.5–5.2)
PROT SERPL-MCNC: 8.4 G/DL (ref 6–8.5)
RBC # BLD AUTO: 4.03 10*6/MM3 (ref 3.77–5.28)
RBC MORPH BLD: NORMAL
SODIUM BLD-SCNC: 140 MMOL/L (ref 136–145)
TRIGL SERPL-MCNC: 97 MG/DL (ref 0–150)
TSH SERPL DL<=0.05 MIU/L-ACNC: 1.18 UIU/ML (ref 0.27–4.2)
VLDLC SERPL-MCNC: 19.4 MG/DL (ref 5–40)
WBC MORPH BLD: NORMAL
WBC NRBC COR # BLD: 6.05 10*3/MM3 (ref 3.4–10.8)

## 2019-11-21 PROCEDURE — 80061 LIPID PANEL: CPT | Performed by: FAMILY MEDICINE

## 2019-11-21 PROCEDURE — 86140 C-REACTIVE PROTEIN: CPT

## 2019-11-21 PROCEDURE — 85007 BL SMEAR W/DIFF WBC COUNT: CPT

## 2019-11-21 PROCEDURE — 80053 COMPREHEN METABOLIC PANEL: CPT

## 2019-11-21 PROCEDURE — 36415 COLL VENOUS BLD VENIPUNCTURE: CPT

## 2019-11-21 PROCEDURE — 86200 CCP ANTIBODY: CPT

## 2019-11-21 PROCEDURE — 85652 RBC SED RATE AUTOMATED: CPT

## 2019-11-21 PROCEDURE — 82306 VITAMIN D 25 HYDROXY: CPT

## 2019-11-21 PROCEDURE — 85027 COMPLETE CBC AUTOMATED: CPT

## 2019-11-21 PROCEDURE — 84443 ASSAY THYROID STIM HORMONE: CPT | Performed by: FAMILY MEDICINE

## 2019-11-21 PROCEDURE — 86431 RHEUMATOID FACTOR QUANT: CPT

## 2019-11-23 LAB — CCP IGA+IGG SERPL IA-ACNC: 8 UNITS (ref 0–19)

## 2019-11-27 RX ORDER — LISINOPRIL 20 MG/1
20 TABLET ORAL DAILY
Qty: 90 TABLET | Refills: 3 | Status: ON HOLD | OUTPATIENT
Start: 2019-11-27 | End: 2020-07-30

## 2019-12-04 PROBLEM — H43.813 PVD (POSTERIOR VITREOUS DETACHMENT), BOTH EYES: Status: ACTIVE | Noted: 2019-12-04

## 2019-12-04 PROBLEM — H40.053 OCULAR HYPERTENSION, BILATERAL: Status: ACTIVE | Noted: 2019-12-04

## 2019-12-04 PROBLEM — H40.013 OPEN ANGLE WITH BORDERLINE FINDINGS, LOW RISK, BILATERAL: Status: ACTIVE | Noted: 2019-12-04

## 2019-12-04 PROBLEM — H04.123 DRY EYE SYNDROME, BILATERAL: Status: ACTIVE | Noted: 2019-12-04

## 2019-12-04 PROBLEM — H18.519 FUCHS' CORNEAL DYSTROPHY: Status: ACTIVE | Noted: 2019-12-04

## 2019-12-04 PROBLEM — Z96.1 PSEUDOPHAKIA OF BOTH EYES: Status: ACTIVE | Noted: 2019-12-04

## 2019-12-05 ENCOUNTER — OFFICE VISIT (OUTPATIENT)
Dept: RHEUMATOLOGY | Facility: CLINIC | Age: 79
End: 2019-12-05

## 2019-12-05 VITALS
WEIGHT: 198 LBS | DIASTOLIC BLOOD PRESSURE: 79 MMHG | BODY MASS INDEX: 33.72 KG/M2 | SYSTOLIC BLOOD PRESSURE: 123 MMHG | HEART RATE: 82 BPM

## 2019-12-05 DIAGNOSIS — R91.1 PULMONARY NODULE: ICD-10-CM

## 2019-12-05 DIAGNOSIS — M06.4 INFLAMMATORY POLYARTHROPATHY (HCC): Primary | ICD-10-CM

## 2019-12-05 DIAGNOSIS — E04.1 THYROID NODULE: ICD-10-CM

## 2019-12-05 PROCEDURE — 99213 OFFICE O/P EST LOW 20 MIN: CPT | Performed by: NURSE PRACTITIONER

## 2019-12-05 RX ORDER — SULFASALAZINE 500 MG/1
1000 TABLET, DELAYED RELEASE ORAL 2 TIMES DAILY
Qty: 360 TABLET | Refills: 0 | Status: SHIPPED | OUTPATIENT
Start: 2019-12-05 | End: 2020-05-27 | Stop reason: SDUPTHER

## 2019-12-05 NOTE — PATIENT INSTRUCTIONS
Stable, continue therapy- SSZ 2 tabs po BID  Labs reviewed & future orders given  RTO 3 months or sooner if needed.

## 2019-12-06 NOTE — PROGRESS NOTES
"Subjective   Leann Calix is a 79 y.o. female.     History of Present Illness     Pt is a pleasant 79 y.o. female here for follow up for the management of her inflammatory arthritis, she is taking SSZ 500mg 3 tabs po BID. She was last seen in the office on in September 2019.  She had recent labs on 11-21-19: her CBC was unremarkable, glucose 102, CRP was mildly elevated at 1.39 (0-0.5).   Dexa scan 12/2/19: normal    She feels \"good.\"She denies any stiffness, joint pain or swelling. Claims her joints \"feel fine.\" She has lost weight w/ the HMR program and feels great. Tries to walk daily & walked 3 miles yesterday without difficulty. Denies any SOA, reports this is \" all better since losing weight.\"  She denies any infections or illness.  She is able to do her ADLs without difficulty.     Review of systems:Denies fever or chills. No sores in the  mouth or nose. She has dry eyes and uses gtts as needed.. No skin rashes or psoriasis. denies chest pain.  Denies SOA-->hx of pulm nodules; had CT scan and is under the care of her pulmonologist. Reports her CT scans show no change, has an upcoming scan in the next month.  Denies ADELINE, jaw pain or blurred vision.  Has thyroid nodules and follows ENT; she had biopsy rand eports her biopsy was benign. Denies N/V/D.The remainder of the review of systems reviewed and is (-). Stopped taking the duloxetine for her fibro & feels no different without it. Feels like exercise helps her more than anything.. Colonscopy 2 yrs ago & reports it was normal.      Sees Marylou for her PVCs & HTN.     The following portions of the patient's history were reviewed and updated as appropriate: allergies, current medications, past family history, past medical history, past social history, past surgical history and problem list.    Review of Systems  See HPI    Objective   Physical Exam   Constitutional: She is oriented to person, place, and time. She appears well-developed and well-nourished. "   Cardiovascular: Normal rate and regular rhythm.   Pulmonary/Chest: Effort normal and breath sounds normal.   Musculoskeletal:   She has full ROM  Non tender on examination  No swelling or synovitis is noted.   Neurological: She is alert and oriented to person, place, and time.   Skin: Skin is warm and dry. No rash noted.   Psychiatric: She has a normal mood and affect.   Vitals reviewed.        Assessment/Plan   Leann was seen today for arthritis.    Diagnoses and all orders for this visit:    Inflammatory polyarthropathy (CMS/[facility])  Comments:  Stable no sign of active disease--discussed medications. She will take  mg tab-2 tabs po BID   Labs reviewed & discussed. Future labs given.  Orders:  -     CBC & Differential; Future  -     Comprehensive Metabolic Panel; Future  -     C-reactive Protein; Future  -     Cyclic Citrul Peptide Antibody, IgG / IgA; Future  -     Sedimentation Rate; Future  -     Urinalysis With Culture If Indicated -; Future    Pulmonary nodule  Comments:  Under the care of pulmonology  Notes to review.    Thyroid nodule  Comments:  She is under the care of ENT & has frequent monitoring.    Other orders  -     sulfaSALAzine (AZULFIDINE) 500 MG EC tablet; Take 2 tablets by mouth 2 (Two) Times a Day.        Patient Instructions   Stable, continue therapy- SSZ 2 tabs po BID  Labs reviewed & future orders given  RTO 3 months or sooner if needed.

## 2020-01-15 ENCOUNTER — PATIENT MESSAGE (OUTPATIENT)
Dept: FAMILY MEDICINE CLINIC | Facility: CLINIC | Age: 80
End: 2020-01-15

## 2020-01-15 DIAGNOSIS — F51.01 PRIMARY INSOMNIA: Primary | ICD-10-CM

## 2020-01-15 RX ORDER — TEMAZEPAM 30 MG/1
30 CAPSULE ORAL NIGHTLY PRN
Qty: 90 CAPSULE | Refills: 0 | Status: SHIPPED | OUTPATIENT
Start: 2020-01-15 | End: 2020-04-07

## 2020-03-17 ENCOUNTER — HOSPITAL ENCOUNTER (OUTPATIENT)
Dept: PET IMAGING | Facility: HOSPITAL | Age: 80
Discharge: HOME | End: 2020-03-17
Admitting: THORACIC SURGERY (CARDIOTHORACIC VASCULAR SURGERY)

## 2020-03-17 DIAGNOSIS — E66.01 MORBID (SEVERE) OBESITY DUE TO EXCESS CALORIES (HCC): ICD-10-CM

## 2020-03-17 DIAGNOSIS — R91.8 MULTIPLE PULMONARY NODULES: ICD-10-CM

## 2020-03-17 PROCEDURE — 71250 CT THORAX DX C-: CPT

## 2020-03-20 ENCOUNTER — TELEPHONE (OUTPATIENT)
Dept: FAMILY MEDICINE CLINIC | Facility: CLINIC | Age: 80
End: 2020-03-20

## 2020-03-20 RX ORDER — SULFAMETHOXAZOLE AND TRIMETHOPRIM 800; 160 MG/1; MG/1
1 TABLET ORAL 2 TIMES DAILY
Qty: 14 TABLET | Refills: 0 | Status: SHIPPED | OUTPATIENT
Start: 2020-03-20 | End: 2020-07-21

## 2020-03-26 ENCOUNTER — OFFICE VISIT (OUTPATIENT)
Dept: SURGERY | Facility: CLINIC | Age: 80
End: 2020-03-26

## 2020-03-26 VITALS
HEART RATE: 110 BPM | SYSTOLIC BLOOD PRESSURE: 140 MMHG | WEIGHT: 185.4 LBS | DIASTOLIC BLOOD PRESSURE: 78 MMHG | OXYGEN SATURATION: 94 % | BODY MASS INDEX: 31.65 KG/M2 | TEMPERATURE: 97.9 F | HEIGHT: 64 IN

## 2020-03-26 DIAGNOSIS — R91.8 LUNG MASS: Primary | ICD-10-CM

## 2020-03-26 PROCEDURE — 99214 OFFICE O/P EST MOD 30 MIN: CPT | Performed by: THORACIC SURGERY (CARDIOTHORACIC VASCULAR SURGERY)

## 2020-03-26 NOTE — PROGRESS NOTES
Thoracic surgery progress note    Chief complaint follow-up of right middle lobe groundglass opacity    Patient returns with a follow-up CT scan that I personally examined from March 17, 2020.  We went over the films together.  I have also reviewed the radiology report with her.  The groundglass opacity in the right middle lobe is slightly larger and is now 10 mm.  There is no significant solid component.  She has no change in pulmonary symptoms no fevers chills night sweats cough hemoptysis no headaches no long bone pain.    She has no signs of [place] at 19.  She does not have a dry cough does not have any pleuritic chest pain she is not short of breath.    The patient is a former smoker.  She is staying smoke-free.  Encouraged to do so    On physical examination she is well-appearing in no distress sclera anicteric neck supple chest expansion symmetrical no cyanosis clubbing or edema.    She knows no contacts with cold with 19.    Impression groundglass opacity right middle lobe slightly increased in size plan follow-up CT scan in 4 months no contrast.  Continue to stay smoke-free  Continue to practice social distancing and isolation as well as reasonable.

## 2020-04-06 DIAGNOSIS — F51.01 PRIMARY INSOMNIA: ICD-10-CM

## 2020-04-07 RX ORDER — TEMAZEPAM 30 MG/1
CAPSULE ORAL
Qty: 30 CAPSULE | Refills: 0 | Status: SHIPPED | OUTPATIENT
Start: 2020-04-07 | End: 2020-05-11 | Stop reason: SDUPTHER

## 2020-05-09 DIAGNOSIS — F51.01 PRIMARY INSOMNIA: ICD-10-CM

## 2020-05-09 RX ORDER — TEMAZEPAM 30 MG/1
CAPSULE ORAL
Qty: 30 CAPSULE | Refills: 0 | OUTPATIENT
Start: 2020-05-09

## 2020-05-11 RX ORDER — TEMAZEPAM 30 MG/1
30 CAPSULE ORAL NIGHTLY PRN
Qty: 30 CAPSULE | Refills: 0 | Status: SHIPPED | OUTPATIENT
Start: 2020-05-11 | End: 2020-06-09

## 2020-05-27 RX ORDER — SULFASALAZINE 500 MG/1
1000 TABLET, DELAYED RELEASE ORAL 2 TIMES DAILY
Qty: 360 TABLET | Refills: 0 | Status: SHIPPED | OUTPATIENT
Start: 2020-05-27 | End: 2020-05-29 | Stop reason: SDUPTHER

## 2020-05-29 ENCOUNTER — TELEPHONE (OUTPATIENT)
Dept: RHEUMATOLOGY | Facility: CLINIC | Age: 80
End: 2020-05-29

## 2020-05-29 RX ORDER — SULFASALAZINE 500 MG/1
1000 TABLET, DELAYED RELEASE ORAL 2 TIMES DAILY
Qty: 360 TABLET | Refills: 0 | Status: SHIPPED | OUTPATIENT
Start: 2020-05-29 | End: 2020-10-14

## 2020-05-29 NOTE — TELEPHONE ENCOUNTER
----- Message from Leann Calix sent at 5/29/2020 11:30 AM EDT -----  Regarding: Prescription Question  Contact: [phone]  [facility] told me they have a difficult time getting sulfasalazine & couldn't fill my prescription. Could you change it and send prescription to [place]? I know timing is bad but I hope you get this in time.  Leann Calix

## 2020-06-09 ENCOUNTER — PATIENT MESSAGE (OUTPATIENT)
Dept: FAMILY MEDICINE CLINIC | Facility: CLINIC | Age: 80
End: 2020-06-09

## 2020-06-09 DIAGNOSIS — F51.01 PRIMARY INSOMNIA: Primary | ICD-10-CM

## 2020-06-09 RX ORDER — TEMAZEPAM 7.5 MG/1
7.5 CAPSULE ORAL NIGHTLY PRN
Qty: 30 CAPSULE | Refills: 0 | Status: SHIPPED | OUTPATIENT
Start: 2020-06-09 | End: 2020-06-10

## 2020-06-10 ENCOUNTER — PATIENT MESSAGE (OUTPATIENT)
Dept: FAMILY MEDICINE CLINIC | Facility: CLINIC | Age: 80
End: 2020-06-10

## 2020-06-10 DIAGNOSIS — F51.01 PRIMARY INSOMNIA: Primary | ICD-10-CM

## 2020-06-10 RX ORDER — TEMAZEPAM 15 MG/1
15 CAPSULE ORAL NIGHTLY PRN
Qty: 30 CAPSULE | Refills: 0 | Status: SHIPPED | OUTPATIENT
Start: 2020-06-10 | End: 2020-07-21

## 2020-07-16 ENCOUNTER — HOSPITAL ENCOUNTER (OUTPATIENT)
Dept: PET IMAGING | Facility: HOSPITAL | Age: 80
Discharge: HOME | End: 2020-07-16
Admitting: THORACIC SURGERY (CARDIOTHORACIC VASCULAR SURGERY)

## 2020-07-16 DIAGNOSIS — R91.8 LUNG MASS: ICD-10-CM

## 2020-07-16 PROCEDURE — 71250 CT THORAX DX C-: CPT

## 2020-07-21 ENCOUNTER — OFFICE VISIT (OUTPATIENT)
Dept: SURGERY | Facility: CLINIC | Age: 80
End: 2020-07-21

## 2020-07-21 ENCOUNTER — PREP FOR SURGERY (OUTPATIENT)
Dept: OTHER | Facility: HOSPITAL | Age: 80
End: 2020-07-21

## 2020-07-21 VITALS
OXYGEN SATURATION: 95 % | HEIGHT: 64 IN | BODY MASS INDEX: 34.83 KG/M2 | DIASTOLIC BLOOD PRESSURE: 76 MMHG | WEIGHT: 204 LBS | HEART RATE: 98 BPM | TEMPERATURE: 97.5 F | SYSTOLIC BLOOD PRESSURE: 173 MMHG

## 2020-07-21 DIAGNOSIS — R91.8 MULTIPLE PULMONARY NODULES: Primary | ICD-10-CM

## 2020-07-21 DIAGNOSIS — G89.29 CHRONIC LOW BACK PAIN, UNSPECIFIED BACK PAIN LATERALITY, UNSPECIFIED WHETHER SCIATICA PRESENT: ICD-10-CM

## 2020-07-21 DIAGNOSIS — M54.50 CHRONIC LOW BACK PAIN, UNSPECIFIED BACK PAIN LATERALITY, UNSPECIFIED WHETHER SCIATICA PRESENT: ICD-10-CM

## 2020-07-21 DIAGNOSIS — Z96.643 STATUS POST BILATERAL HIP REPLACEMENTS: ICD-10-CM

## 2020-07-21 PROBLEM — R91.1 RIGHT MIDDLE LOBE PULMONARY NODULE: Status: ACTIVE | Noted: 2020-07-21

## 2020-07-21 PROCEDURE — 99214 OFFICE O/P EST MOD 30 MIN: CPT | Performed by: THORACIC SURGERY (CARDIOTHORACIC VASCULAR SURGERY)

## 2020-07-21 PROCEDURE — 94618 PULMONARY STRESS TESTING: CPT | Performed by: THORACIC SURGERY (CARDIOTHORACIC VASCULAR SURGERY)

## 2020-07-21 RX ORDER — CHLORHEXIDINE GLUCONATE 0.12 MG/ML
15 RINSE ORAL EVERY 12 HOURS SCHEDULED
Status: CANCELLED | OUTPATIENT
Start: 2020-07-21

## 2020-07-21 NOTE — PROGRESS NOTES
Thoracic surgery progress note    Chief complaint follow-up of right middle lobe pulmonary nodule    Patient returns with a repeat CT scan dated July 16, 2020.  I personally examined the scan and reviewed the radiology report.  She has an increase in groundglass opacity which now is partly solid.  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  I have explained this to the patient and related that we should proceed with surgical resection.  Needle biopsy may yield a falsely negative report.  The radiographic progression is highly indicative of adenocarcinoma.    Patient does not have any new pulmonary symptoms.  No fever chills night sweats cough hemoptysis sputum production headaches or long bone pain.  She is a former smoker.  She has not resumed smoking.    On physical examination she is well-appearing in no distress sclera anicteric conjunctiva pink neck is supple trachea midline.  Symmetrical chest expansion.  Abdomen nondistended nontender cardiac regular rate and rhythm.  Extremities free of cyanosis clubbing or edema.  Of note the patient has bilateral knee replacements bilateral hip replacements and she has had a cholecystectomy all the scars are well-healed.    All systems have been reviewed.  She has discomfort in her shoulders all other systems are negative.    1.  Increasing right middle lobe pulmonary nodule plan thoracoscopic resection possible right middle lobectomy I explained procedure risks rational benefits and alternatives to her and she is in agreement with proceeding.  2.  We will complete pulmonary function test and 6-minute walk to assess her pulmonary functional status however I am confident given the patient's condition that she can tolerate a right middle lobectomy.    6-minute walk completed.  Initial saturation 95% distance 1386 feet final saturation 91% mild perceived dyspnea.

## 2020-07-22 ENCOUNTER — LAB (OUTPATIENT)
Dept: LAB | Facility: HOSPITAL | Age: 80
End: 2020-07-22

## 2020-07-22 ENCOUNTER — TRANSCRIBE ORDERS (OUTPATIENT)
Dept: SURGERY | Facility: CLINIC | Age: 80
End: 2020-07-22

## 2020-07-22 ENCOUNTER — HOSPITAL ENCOUNTER (OUTPATIENT)
Dept: CARDIOLOGY | Facility: HOSPITAL | Age: 80
Discharge: HOME | End: 2020-07-22
Admitting: THORACIC SURGERY (CARDIOTHORACIC VASCULAR SURGERY)

## 2020-07-22 DIAGNOSIS — R91.8 MULTIPLE PULMONARY NODULES: ICD-10-CM

## 2020-07-22 DIAGNOSIS — Z01.818 OTHER SPECIFIED PRE-OPERATIVE EXAMINATION: Primary | ICD-10-CM

## 2020-07-22 DIAGNOSIS — Z01.818 OTHER SPECIFIED PRE-OPERATIVE EXAMINATION: ICD-10-CM

## 2020-07-22 PROCEDURE — 93005 ELECTROCARDIOGRAM TRACING: CPT | Performed by: THORACIC SURGERY (CARDIOTHORACIC VASCULAR SURGERY)

## 2020-07-22 PROCEDURE — 87635 SARS-COV-2 COVID-19 AMP PRB: CPT

## 2020-07-22 PROCEDURE — C9803 HOPD COVID-19 SPEC COLLECT: HCPCS

## 2020-07-23 LAB — SARS-COV-2 RNA PNL SPEC NAA+PROBE: NOT DETECTED

## 2020-07-23 RX ORDER — LISINOPRIL AND HYDROCHLOROTHIAZIDE 20; 12.5 MG/1; MG/1
1 TABLET ORAL DAILY
COMMUNITY
End: 2020-12-09 | Stop reason: SDUPTHER

## 2020-07-24 ENCOUNTER — HOSPITAL ENCOUNTER (OUTPATIENT)
Dept: RESPIRATORY THERAPY | Facility: HOSPITAL | Age: 80
Discharge: HOME | End: 2020-07-24
Admitting: THORACIC SURGERY (CARDIOTHORACIC VASCULAR SURGERY)

## 2020-07-24 ENCOUNTER — LAB (OUTPATIENT)
Dept: LAB | Facility: HOSPITAL | Age: 80
End: 2020-07-24

## 2020-07-24 DIAGNOSIS — R91.8 MULTIPLE PULMONARY NODULES: ICD-10-CM

## 2020-07-24 LAB
ABO GROUP BLD: NORMAL
ALBUMIN SERPL-MCNC: 4.4 G/DL (ref 3.5–5.2)
ALBUMIN/GLOB SERPL: 1.2 G/DL
ALP SERPL-CCNC: 55 U/L (ref 39–117)
ALT SERPL W P-5'-P-CCNC: 23 U/L (ref 1–33)
ANION GAP SERPL CALCULATED.3IONS-SCNC: 9 MMOL/L (ref 5–15)
APTT PPP: 22.8 SECONDS (ref 24–31)
AST SERPL-CCNC: 24 U/L (ref 1–32)
BASOPHILS # BLD AUTO: 0 10*3/MM3 (ref 0–0.2)
BASOPHILS NFR BLD AUTO: 0.7 % (ref 0–1.5)
BILIRUB SERPL-MCNC: 0.4 MG/DL (ref 0–1.2)
BLD GP AB SCN SERPL QL: NEGATIVE
BUN SERPL-MCNC: 21 MG/DL (ref 8–23)
BUN/CREAT SERPL: 25.9 (ref 7–25)
CALCIUM SPEC-SCNC: 10.5 MG/DL (ref 8.6–10.5)
CHLORIDE SERPL-SCNC: 100 MMOL/L (ref 98–107)
CO2 SERPL-SCNC: 28 MMOL/L (ref 22–29)
CREAT SERPL-MCNC: 0.81 MG/DL (ref 0.57–1)
DEPRECATED RDW RBC AUTO: 45.1 FL (ref 37–54)
EOSINOPHIL # BLD AUTO: 0.1 10*3/MM3 (ref 0–0.4)
EOSINOPHIL NFR BLD AUTO: 0.8 % (ref 0.3–6.2)
ERYTHROCYTE [DISTWIDTH] IN BLOOD BY AUTOMATED COUNT: 13.3 % (ref 12.3–15.4)
GFR SERPL CREATININE-BSD FRML MDRD: 68 ML/MIN/1.73
GLOBULIN UR ELPH-MCNC: 3.6 GM/DL
GLUCOSE SERPL-MCNC: 93 MG/DL (ref 65–99)
HCT VFR BLD AUTO: 44 % (ref 34–46.6)
HGB BLD-MCNC: 14.6 G/DL (ref 12–15.9)
INR PPP: 0.98 (ref 0.9–1.1)
LYMPHOCYTES # BLD AUTO: 1.8 10*3/MM3 (ref 0.7–3.1)
LYMPHOCYTES NFR BLD AUTO: 26.6 % (ref 19.6–45.3)
MCH RBC QN AUTO: 32.2 PG (ref 26.6–33)
MCHC RBC AUTO-ENTMCNC: 33.2 G/DL (ref 31.5–35.7)
MCV RBC AUTO: 97.1 FL (ref 79–97)
MONOCYTES # BLD AUTO: 0.8 10*3/MM3 (ref 0.1–0.9)
MONOCYTES NFR BLD AUTO: 11.8 % (ref 5–12)
MRSA DNA SPEC QL NAA+PROBE: NORMAL
NEUTROPHILS NFR BLD AUTO: 4 10*3/MM3 (ref 1.7–7)
NEUTROPHILS NFR BLD AUTO: 60.1 % (ref 42.7–76)
NRBC BLD AUTO-RTO: 0 /100 WBC (ref 0–0.2)
PLATELET # BLD AUTO: 263 10*3/MM3 (ref 140–450)
PMV BLD AUTO: 8.1 FL (ref 6–12)
POTASSIUM SERPL-SCNC: 4.5 MMOL/L (ref 3.5–5.2)
PROT SERPL-MCNC: 8 G/DL (ref 6–8.5)
PROTHROMBIN TIME: 10.4 SECONDS (ref 9.6–11.7)
RBC # BLD AUTO: 4.53 10*6/MM3 (ref 3.77–5.28)
RH BLD: POSITIVE
SODIUM SERPL-SCNC: 137 MMOL/L (ref 136–145)
T&S EXPIRATION DATE: NORMAL
WBC # BLD AUTO: 6.7 10*3/MM3 (ref 3.4–10.8)

## 2020-07-24 PROCEDURE — 85610 PROTHROMBIN TIME: CPT

## 2020-07-24 PROCEDURE — 86901 BLOOD TYPING SEROLOGIC RH(D): CPT

## 2020-07-24 PROCEDURE — 80053 COMPREHEN METABOLIC PANEL: CPT

## 2020-07-24 PROCEDURE — 86900 BLOOD TYPING SEROLOGIC ABO: CPT

## 2020-07-24 PROCEDURE — 86923 COMPATIBILITY TEST ELECTRIC: CPT

## 2020-07-24 PROCEDURE — 94010 BREATHING CAPACITY TEST: CPT

## 2020-07-24 PROCEDURE — 85025 COMPLETE CBC W/AUTO DIFF WBC: CPT

## 2020-07-24 PROCEDURE — 86900 BLOOD TYPING SEROLOGIC ABO: CPT | Performed by: THORACIC SURGERY (CARDIOTHORACIC VASCULAR SURGERY)

## 2020-07-24 PROCEDURE — 85730 THROMBOPLASTIN TIME PARTIAL: CPT

## 2020-07-24 PROCEDURE — 36415 COLL VENOUS BLD VENIPUNCTURE: CPT

## 2020-07-24 PROCEDURE — 94729 DIFFUSING CAPACITY: CPT

## 2020-07-24 PROCEDURE — 86850 RBC ANTIBODY SCREEN: CPT | Performed by: THORACIC SURGERY (CARDIOTHORACIC VASCULAR SURGERY)

## 2020-07-24 PROCEDURE — 86901 BLOOD TYPING SEROLOGIC RH(D): CPT | Performed by: THORACIC SURGERY (CARDIOTHORACIC VASCULAR SURGERY)

## 2020-07-24 PROCEDURE — 87641 MR-STAPH DNA AMP PROBE: CPT | Performed by: THORACIC SURGERY (CARDIOTHORACIC VASCULAR SURGERY)

## 2020-07-24 PROCEDURE — 94726 PLETHYSMOGRAPHY LUNG VOLUMES: CPT

## 2020-07-24 RX ORDER — ALBUTEROL SULFATE 90 UG/1
2 AEROSOL, METERED RESPIRATORY (INHALATION) ONCE AS NEEDED
Status: DISCONTINUED | OUTPATIENT
Start: 2020-07-24 | End: 2020-07-25 | Stop reason: HOSPADM

## 2020-07-27 ENCOUNTER — LAB (OUTPATIENT)
Dept: LAB | Facility: HOSPITAL | Age: 80
End: 2020-07-27

## 2020-07-27 PROCEDURE — C9803 HOPD COVID-19 SPEC COLLECT: HCPCS

## 2020-07-27 PROCEDURE — U0002 COVID-19 LAB TEST NON-CDC: HCPCS

## 2020-07-28 ENCOUNTER — ANESTHESIA EVENT (OUTPATIENT)
Dept: PERIOP | Facility: HOSPITAL | Age: 80
End: 2020-07-28

## 2020-07-28 LAB
REF LAB TEST METHOD: NORMAL
SARS-COV-2 RNA RESP QL NAA+PROBE: NOT DETECTED

## 2020-07-29 ENCOUNTER — ANESTHESIA (OUTPATIENT)
Dept: PERIOP | Facility: HOSPITAL | Age: 80
End: 2020-07-29

## 2020-07-29 ENCOUNTER — HOSPITAL ENCOUNTER (INPATIENT)
Facility: HOSPITAL | Age: 80
LOS: 2 days | Discharge: HOME | End: 2020-07-31
Attending: THORACIC SURGERY (CARDIOTHORACIC VASCULAR SURGERY) | Admitting: THORACIC SURGERY (CARDIOTHORACIC VASCULAR SURGERY)

## 2020-07-29 DIAGNOSIS — R91.8 MULTIPLE PULMONARY NODULES: ICD-10-CM

## 2020-07-29 LAB
BH BB BLOOD EXPIRATION DATE: NORMAL
BH BB BLOOD EXPIRATION DATE: NORMAL
BH BB BLOOD TYPE BARCODE: 6200
BH BB BLOOD TYPE BARCODE: 6200
BH BB DISPENSE STATUS: NORMAL
BH BB DISPENSE STATUS: NORMAL
BH BB PRODUCT CODE: NORMAL
BH BB PRODUCT CODE: NORMAL
BH BB UNIT NUMBER: NORMAL
BH BB UNIT NUMBER: NORMAL
CROSSMATCH INTERPRETATION: NORMAL
CROSSMATCH INTERPRETATION: NORMAL
UNIT  ABO: NORMAL
UNIT  ABO: NORMAL
UNIT  RH: NORMAL
UNIT  RH: NORMAL

## 2020-07-29 PROCEDURE — 88309 TISSUE EXAM BY PATHOLOGIST: CPT | Performed by: THORACIC SURGERY (CARDIOTHORACIC VASCULAR SURGERY)

## 2020-07-29 PROCEDURE — 4A133J1 MONITORING OF ARTERIAL PULSE, PERIPHERAL, PERCUTANEOUS APPROACH: ICD-10-PCS | Performed by: ANESTHESIOLOGY

## 2020-07-29 PROCEDURE — 07B74ZX EXCISION OF THORAX LYMPHATIC, PERCUTANEOUS ENDOSCOPIC APPROACH, DIAGNOSTIC: ICD-10-PCS | Performed by: THORACIC SURGERY (CARDIOTHORACIC VASCULAR SURGERY)

## 2020-07-29 PROCEDURE — 0BT54ZZ RESECTION OF RIGHT MIDDLE LOBE BRONCHUS, PERCUTANEOUS ENDOSCOPIC APPROACH: ICD-10-PCS | Performed by: THORACIC SURGERY (CARDIOTHORACIC VASCULAR SURGERY)

## 2020-07-29 PROCEDURE — 25010000002 FENTANYL CITRATE (PF) 100 MCG/2ML SOLUTION: Performed by: ANESTHESIOLOGY

## 2020-07-29 PROCEDURE — 03HY32Z INSERTION OF MONITORING DEVICE INTO UPPER ARTERY, PERCUTANEOUS APPROACH: ICD-10-PCS | Performed by: ANESTHESIOLOGY

## 2020-07-29 PROCEDURE — 25010000002 DEXAMETHASONE SODIUM PHOSPHATE 20 MG/5ML SOLUTION: Performed by: ANESTHESIOLOGY

## 2020-07-29 PROCEDURE — C2615 SEALANT, PULMONARY, LIQUID: HCPCS | Performed by: THORACIC SURGERY (CARDIOTHORACIC VASCULAR SURGERY)

## 2020-07-29 PROCEDURE — 94640 AIRWAY INHALATION TREATMENT: CPT

## 2020-07-29 PROCEDURE — 25010000002 ONDANSETRON PER 1 MG: Performed by: ANESTHESIOLOGY

## 2020-07-29 PROCEDURE — 25010000002 CEFAZOLIN PER 500 MG: Performed by: THORACIC SURGERY (CARDIOTHORACIC VASCULAR SURGERY)

## 2020-07-29 PROCEDURE — 32663 THORACOSCOPY W/LOBECTOMY: CPT | Performed by: THORACIC SURGERY (CARDIOTHORACIC VASCULAR SURGERY)

## 2020-07-29 PROCEDURE — 88307 TISSUE EXAM BY PATHOLOGIST: CPT | Performed by: THORACIC SURGERY (CARDIOTHORACIC VASCULAR SURGERY)

## 2020-07-29 PROCEDURE — 25010000002 PROPOFOL 10 MG/ML EMULSION: Performed by: ANESTHESIOLOGY

## 2020-07-29 PROCEDURE — 88331 PATH CONSLTJ SURG 1 BLK 1SPC: CPT | Performed by: THORACIC SURGERY (CARDIOTHORACIC VASCULAR SURGERY)

## 2020-07-29 PROCEDURE — 4A133B1 MONITORING OF ARTERIAL PRESSURE, PERIPHERAL, PERCUTANEOUS APPROACH: ICD-10-PCS | Performed by: ANESTHESIOLOGY

## 2020-07-29 PROCEDURE — 25010000002 MORPHINE PER 10 MG: Performed by: THORACIC SURGERY (CARDIOTHORACIC VASCULAR SURGERY)

## 2020-07-29 PROCEDURE — 25010000002 HYDROMORPHONE PER 4 MG: Performed by: ANESTHESIOLOGY

## 2020-07-29 PROCEDURE — 25010000002 MIDAZOLAM PER 1 MG: Performed by: ANESTHESIOLOGY

## 2020-07-29 PROCEDURE — 32674 THORACOSCOPY LYMPH NODE EXC: CPT | Performed by: THORACIC SURGERY (CARDIOTHORACIC VASCULAR SURGERY)

## 2020-07-29 PROCEDURE — 94799 UNLISTED PULMONARY SVC/PX: CPT

## 2020-07-29 DEVICE — CURVED TIP INTELLIGENT RELOAD AND INTRODUCER
Type: IMPLANTABLE DEVICE | Site: CHEST | Status: FUNCTIONAL
Brand: TRI-STAPLE 2.0

## 2020-07-29 DEVICE — FLOSEAL HEMOSTATIC MATRIX, 10ML
Type: IMPLANTABLE DEVICE | Site: CHEST | Status: FUNCTIONAL
Brand: FLOSEAL HEMOSTATIC MATRIX

## 2020-07-29 DEVICE — SEALANT PLURAL AIRLEAK PROGEL W/APPL 4ML: Type: IMPLANTABLE DEVICE | Site: CHEST | Status: FUNCTIONAL

## 2020-07-29 DEVICE — GRAY INTELLIGENT RELOAD
Type: IMPLANTABLE DEVICE | Site: CHEST | Status: FUNCTIONAL
Brand: TRI-STAPLE 2.0

## 2020-07-29 DEVICE — ARTICULATING RELOAD WITH TRI-STAPLE TECHNOLOGY
Type: IMPLANTABLE DEVICE | Site: CHEST | Status: FUNCTIONAL
Brand: ENDO GIA

## 2020-07-29 RX ORDER — MIDAZOLAM HYDROCHLORIDE 1 MG/ML
1 INJECTION INTRAMUSCULAR; INTRAVENOUS
Status: DISCONTINUED | OUTPATIENT
Start: 2020-07-29 | End: 2020-07-29 | Stop reason: HOSPADM

## 2020-07-29 RX ORDER — HYDROMORPHONE HCL 110MG/55ML
PATIENT CONTROLLED ANALGESIA SYRINGE INTRAVENOUS AS NEEDED
Status: DISCONTINUED | OUTPATIENT
Start: 2020-07-29 | End: 2020-07-29 | Stop reason: SURG

## 2020-07-29 RX ORDER — MIDAZOLAM HYDROCHLORIDE 1 MG/ML
INJECTION INTRAMUSCULAR; INTRAVENOUS AS NEEDED
Status: DISCONTINUED | OUTPATIENT
Start: 2020-07-29 | End: 2020-07-29 | Stop reason: SURG

## 2020-07-29 RX ORDER — HYDROMORPHONE HCL 110MG/55ML
0.5 PATIENT CONTROLLED ANALGESIA SYRINGE INTRAVENOUS
Status: DISCONTINUED | OUTPATIENT
Start: 2020-07-29 | End: 2020-07-29 | Stop reason: HOSPADM

## 2020-07-29 RX ORDER — CALCIUM GLUCONATE 20 MG/ML
1 INJECTION, SOLUTION INTRAVENOUS AS NEEDED
Status: DISCONTINUED | OUTPATIENT
Start: 2020-07-29 | End: 2020-07-31 | Stop reason: HOSPADM

## 2020-07-29 RX ORDER — ALBUTEROL SULFATE 2.5 MG/3ML
2.5 SOLUTION RESPIRATORY (INHALATION) ONCE AS NEEDED
Status: DISCONTINUED | OUTPATIENT
Start: 2020-07-29 | End: 2020-07-29 | Stop reason: HOSPADM

## 2020-07-29 RX ORDER — NALOXONE HCL 0.4 MG/ML
0.4 VIAL (ML) INJECTION AS NEEDED
Status: DISCONTINUED | OUTPATIENT
Start: 2020-07-29 | End: 2020-07-29 | Stop reason: HOSPADM

## 2020-07-29 RX ORDER — ONDANSETRON 2 MG/ML
4 INJECTION INTRAMUSCULAR; INTRAVENOUS ONCE AS NEEDED
Status: DISCONTINUED | OUTPATIENT
Start: 2020-07-29 | End: 2020-07-29 | Stop reason: HOSPADM

## 2020-07-29 RX ORDER — SODIUM CHLORIDE 0.9 % (FLUSH) 0.9 %
10 SYRINGE (ML) INJECTION EVERY 12 HOURS SCHEDULED
Status: DISCONTINUED | OUTPATIENT
Start: 2020-07-29 | End: 2020-07-29 | Stop reason: HOSPADM

## 2020-07-29 RX ORDER — DEXAMETHASONE SODIUM PHOSPHATE 4 MG/ML
INJECTION, SOLUTION INTRA-ARTICULAR; INTRALESIONAL; INTRAMUSCULAR; INTRAVENOUS; SOFT TISSUE AS NEEDED
Status: DISCONTINUED | OUTPATIENT
Start: 2020-07-29 | End: 2020-07-29 | Stop reason: SURG

## 2020-07-29 RX ORDER — NALOXONE HCL 0.4 MG/ML
0.4 VIAL (ML) INJECTION
Status: DISCONTINUED | OUTPATIENT
Start: 2020-07-29 | End: 2020-07-31 | Stop reason: HOSPADM

## 2020-07-29 RX ORDER — HEPARIN SODIUM 5000 [USP'U]/ML
5000 INJECTION, SOLUTION INTRAVENOUS; SUBCUTANEOUS EVERY 12 HOURS SCHEDULED
Status: DISCONTINUED | OUTPATIENT
Start: 2020-07-30 | End: 2020-07-31 | Stop reason: HOSPADM

## 2020-07-29 RX ORDER — SODIUM CHLORIDE 0.9 % (FLUSH) 0.9 %
3 SYRINGE (ML) INJECTION EVERY 12 HOURS SCHEDULED
Status: DISCONTINUED | OUTPATIENT
Start: 2020-07-29 | End: 2020-07-31 | Stop reason: HOSPADM

## 2020-07-29 RX ORDER — ONDANSETRON 2 MG/ML
4 INJECTION INTRAMUSCULAR; INTRAVENOUS EVERY 6 HOURS PRN
Status: DISCONTINUED | OUTPATIENT
Start: 2020-07-29 | End: 2020-07-31 | Stop reason: HOSPADM

## 2020-07-29 RX ORDER — LORAZEPAM 2 MG/ML
1 INJECTION INTRAMUSCULAR
Status: DISCONTINUED | OUTPATIENT
Start: 2020-07-29 | End: 2020-07-29 | Stop reason: HOSPADM

## 2020-07-29 RX ORDER — DIPHENHYDRAMINE HYDROCHLORIDE 50 MG/ML
12.5 INJECTION INTRAMUSCULAR; INTRAVENOUS
Status: DISCONTINUED | OUTPATIENT
Start: 2020-07-29 | End: 2020-07-29 | Stop reason: HOSPADM

## 2020-07-29 RX ORDER — POTASSIUM CHLORIDE 7.45 MG/ML
10 INJECTION INTRAVENOUS
Status: DISCONTINUED | OUTPATIENT
Start: 2020-07-29 | End: 2020-07-31 | Stop reason: HOSPADM

## 2020-07-29 RX ORDER — PROMETHAZINE HYDROCHLORIDE 25 MG/1
25 TABLET ORAL ONCE AS NEEDED
Status: DISCONTINUED | OUTPATIENT
Start: 2020-07-29 | End: 2020-07-29 | Stop reason: HOSPADM

## 2020-07-29 RX ORDER — CALCIUM GLUCONATE 20 MG/ML
2 INJECTION, SOLUTION INTRAVENOUS AS NEEDED
Status: DISCONTINUED | OUTPATIENT
Start: 2020-07-29 | End: 2020-07-31 | Stop reason: HOSPADM

## 2020-07-29 RX ORDER — SODIUM CHLORIDE 0.9 % (FLUSH) 0.9 %
10 SYRINGE (ML) INJECTION AS NEEDED
Status: DISCONTINUED | OUTPATIENT
Start: 2020-07-29 | End: 2020-07-29 | Stop reason: HOSPADM

## 2020-07-29 RX ORDER — FLUMAZENIL 0.1 MG/ML
0.2 INJECTION INTRAVENOUS AS NEEDED
Status: DISCONTINUED | OUTPATIENT
Start: 2020-07-29 | End: 2020-07-29 | Stop reason: HOSPADM

## 2020-07-29 RX ORDER — ROCURONIUM BROMIDE 10 MG/ML
INJECTION, SOLUTION INTRAVENOUS AS NEEDED
Status: DISCONTINUED | OUTPATIENT
Start: 2020-07-29 | End: 2020-07-29 | Stop reason: SURG

## 2020-07-29 RX ORDER — HYDRALAZINE HYDROCHLORIDE 20 MG/ML
5 INJECTION INTRAMUSCULAR; INTRAVENOUS
Status: DISCONTINUED | OUTPATIENT
Start: 2020-07-29 | End: 2020-07-29 | Stop reason: HOSPADM

## 2020-07-29 RX ORDER — LABETALOL HYDROCHLORIDE 5 MG/ML
INJECTION, SOLUTION INTRAVENOUS AS NEEDED
Status: DISCONTINUED | OUTPATIENT
Start: 2020-07-29 | End: 2020-07-29 | Stop reason: SURG

## 2020-07-29 RX ORDER — SODIUM CHLORIDE 0.9 % (FLUSH) 0.9 %
10 SYRINGE (ML) INJECTION EVERY 12 HOURS SCHEDULED
Status: DISCONTINUED | OUTPATIENT
Start: 2020-07-29 | End: 2020-07-31 | Stop reason: HOSPADM

## 2020-07-29 RX ORDER — NICARDIPINE HYDROCHLORIDE 2.5 MG/ML
INJECTION INTRAVENOUS AS NEEDED
Status: DISCONTINUED | OUTPATIENT
Start: 2020-07-29 | End: 2020-07-29 | Stop reason: SURG

## 2020-07-29 RX ORDER — PROPOFOL 10 MG/ML
VIAL (ML) INTRAVENOUS AS NEEDED
Status: DISCONTINUED | OUTPATIENT
Start: 2020-07-29 | End: 2020-07-29 | Stop reason: SURG

## 2020-07-29 RX ORDER — MAGNESIUM SULFATE HEPTAHYDRATE 40 MG/ML
4 INJECTION, SOLUTION INTRAVENOUS AS NEEDED
Status: DISCONTINUED | OUTPATIENT
Start: 2020-07-29 | End: 2020-07-31 | Stop reason: HOSPADM

## 2020-07-29 RX ORDER — GLYCOPYRROLATE 1 MG/5 ML
SYRINGE (ML) INTRAVENOUS AS NEEDED
Status: DISCONTINUED | OUTPATIENT
Start: 2020-07-29 | End: 2020-07-29 | Stop reason: SURG

## 2020-07-29 RX ORDER — SODIUM CHLORIDE 9 MG/ML
75 INJECTION, SOLUTION INTRAVENOUS CONTINUOUS
Status: DISCONTINUED | OUTPATIENT
Start: 2020-07-29 | End: 2020-07-30

## 2020-07-29 RX ORDER — SODIUM CHLORIDE 0.9 % (FLUSH) 0.9 %
10 SYRINGE (ML) INJECTION AS NEEDED
Status: DISCONTINUED | OUTPATIENT
Start: 2020-07-29 | End: 2020-07-31 | Stop reason: HOSPADM

## 2020-07-29 RX ORDER — PROMETHAZINE HYDROCHLORIDE 25 MG/ML
6.25 INJECTION, SOLUTION INTRAMUSCULAR; INTRAVENOUS ONCE AS NEEDED
Status: DISCONTINUED | OUTPATIENT
Start: 2020-07-29 | End: 2020-07-29 | Stop reason: HOSPADM

## 2020-07-29 RX ORDER — ACETAMINOPHEN 325 MG/1
650 TABLET ORAL EVERY 4 HOURS PRN
Status: DISCONTINUED | OUTPATIENT
Start: 2020-07-29 | End: 2020-07-31 | Stop reason: HOSPADM

## 2020-07-29 RX ORDER — HYDROCHLOROTHIAZIDE 25 MG/1
25 TABLET ORAL DAILY
Status: DISCONTINUED | OUTPATIENT
Start: 2020-07-29 | End: 2020-07-30

## 2020-07-29 RX ORDER — MAGNESIUM SULFATE HEPTAHYDRATE 40 MG/ML
2 INJECTION, SOLUTION INTRAVENOUS AS NEEDED
Status: DISCONTINUED | OUTPATIENT
Start: 2020-07-29 | End: 2020-07-31 | Stop reason: HOSPADM

## 2020-07-29 RX ORDER — PROMETHAZINE HYDROCHLORIDE 25 MG/1
25 SUPPOSITORY RECTAL ONCE AS NEEDED
Status: DISCONTINUED | OUTPATIENT
Start: 2020-07-29 | End: 2020-07-29 | Stop reason: HOSPADM

## 2020-07-29 RX ORDER — MORPHINE SULFATE 4 MG/ML
4 INJECTION, SOLUTION INTRAMUSCULAR; INTRAVENOUS EVERY 4 HOURS PRN
Status: DISCONTINUED | OUTPATIENT
Start: 2020-07-29 | End: 2020-07-31 | Stop reason: HOSPADM

## 2020-07-29 RX ORDER — LABETALOL HYDROCHLORIDE 5 MG/ML
5 INJECTION, SOLUTION INTRAVENOUS
Status: DISCONTINUED | OUTPATIENT
Start: 2020-07-29 | End: 2020-07-29 | Stop reason: HOSPADM

## 2020-07-29 RX ORDER — NALBUPHINE HCL 10 MG/ML
10 AMPUL (ML) INJECTION EVERY 4 HOURS PRN
Status: DISCONTINUED | OUTPATIENT
Start: 2020-07-29 | End: 2020-07-29 | Stop reason: HOSPADM

## 2020-07-29 RX ORDER — SODIUM CHLORIDE 0.9 % (FLUSH) 0.9 %
3-10 SYRINGE (ML) INJECTION AS NEEDED
Status: DISCONTINUED | OUTPATIENT
Start: 2020-07-29 | End: 2020-07-31 | Stop reason: HOSPADM

## 2020-07-29 RX ORDER — PHENYLEPHRINE HCL IN 0.9% NACL 0.5 MG/5ML
.5-3 SYRINGE (ML) INTRAVENOUS
Status: DISCONTINUED | OUTPATIENT
Start: 2020-07-29 | End: 2020-07-29 | Stop reason: HOSPADM

## 2020-07-29 RX ORDER — LIDOCAINE HYDROCHLORIDE 20 MG/ML
INJECTION, SOLUTION EPIDURAL; INFILTRATION; INTRACAUDAL; PERINEURAL AS NEEDED
Status: DISCONTINUED | OUTPATIENT
Start: 2020-07-29 | End: 2020-07-29 | Stop reason: SURG

## 2020-07-29 RX ORDER — NEOSTIGMINE METHYLSULFATE 5 MG/5 ML
SYRINGE (ML) INTRAVENOUS AS NEEDED
Status: DISCONTINUED | OUTPATIENT
Start: 2020-07-29 | End: 2020-07-29 | Stop reason: SURG

## 2020-07-29 RX ORDER — EPHEDRINE SULFATE 50 MG/ML
5 INJECTION, SOLUTION INTRAVENOUS ONCE AS NEEDED
Status: DISCONTINUED | OUTPATIENT
Start: 2020-07-29 | End: 2020-07-29 | Stop reason: HOSPADM

## 2020-07-29 RX ORDER — POTASSIUM CHLORIDE 1.5 G/1.77G
40 POWDER, FOR SOLUTION ORAL AS NEEDED
Status: DISCONTINUED | OUTPATIENT
Start: 2020-07-29 | End: 2020-07-31 | Stop reason: HOSPADM

## 2020-07-29 RX ORDER — IPRATROPIUM BROMIDE AND ALBUTEROL SULFATE 2.5; .5 MG/3ML; MG/3ML
3 SOLUTION RESPIRATORY (INHALATION)
Status: DISPENSED | OUTPATIENT
Start: 2020-07-29 | End: 2020-07-31

## 2020-07-29 RX ORDER — BUPIVACAINE HYDROCHLORIDE AND EPINEPHRINE 2.5; 5 MG/ML; UG/ML
INJECTION, SOLUTION EPIDURAL; INFILTRATION; INTRACAUDAL; PERINEURAL AS NEEDED
Status: DISCONTINUED | OUTPATIENT
Start: 2020-07-29 | End: 2020-07-29 | Stop reason: HOSPADM

## 2020-07-29 RX ORDER — MORPHINE SULFATE 4 MG/ML
2 INJECTION, SOLUTION INTRAMUSCULAR; INTRAVENOUS EVERY 4 HOURS PRN
Status: DISCONTINUED | OUTPATIENT
Start: 2020-07-29 | End: 2020-07-31 | Stop reason: HOSPADM

## 2020-07-29 RX ORDER — SODIUM CHLORIDE, SODIUM LACTATE, POTASSIUM CHLORIDE, CALCIUM CHLORIDE 600; 310; 30; 20 MG/100ML; MG/100ML; MG/100ML; MG/100ML
9 INJECTION, SOLUTION INTRAVENOUS CONTINUOUS PRN
Status: DISCONTINUED | OUTPATIENT
Start: 2020-07-29 | End: 2020-07-29 | Stop reason: HOSPADM

## 2020-07-29 RX ORDER — IPRATROPIUM BROMIDE AND ALBUTEROL SULFATE 2.5; .5 MG/3ML; MG/3ML
3 SOLUTION RESPIRATORY (INHALATION) EVERY 6 HOURS PRN
Status: DISCONTINUED | OUTPATIENT
Start: 2020-07-29 | End: 2020-07-31 | Stop reason: HOSPADM

## 2020-07-29 RX ORDER — ONDANSETRON 2 MG/ML
INJECTION INTRAMUSCULAR; INTRAVENOUS AS NEEDED
Status: DISCONTINUED | OUTPATIENT
Start: 2020-07-29 | End: 2020-07-29 | Stop reason: SURG

## 2020-07-29 RX ORDER — MEPERIDINE HYDROCHLORIDE 25 MG/ML
12.5 INJECTION INTRAMUSCULAR; INTRAVENOUS; SUBCUTANEOUS
Status: DISCONTINUED | OUTPATIENT
Start: 2020-07-29 | End: 2020-07-29 | Stop reason: HOSPADM

## 2020-07-29 RX ORDER — NALBUPHINE HCL 10 MG/ML
2 AMPUL (ML) INJECTION EVERY 4 HOURS PRN
Status: DISCONTINUED | OUTPATIENT
Start: 2020-07-29 | End: 2020-07-29 | Stop reason: HOSPADM

## 2020-07-29 RX ORDER — POTASSIUM CHLORIDE 20 MEQ/1
40 TABLET, EXTENDED RELEASE ORAL AS NEEDED
Status: DISCONTINUED | OUTPATIENT
Start: 2020-07-29 | End: 2020-07-31 | Stop reason: HOSPADM

## 2020-07-29 RX ORDER — IPRATROPIUM BROMIDE AND ALBUTEROL SULFATE 2.5; .5 MG/3ML; MG/3ML
3 SOLUTION RESPIRATORY (INHALATION)
Status: DISCONTINUED | OUTPATIENT
Start: 2020-07-29 | End: 2020-07-29 | Stop reason: SDUPTHER

## 2020-07-29 RX ORDER — SODIUM CHLORIDE 9 MG/ML
INJECTION, SOLUTION INTRAVENOUS CONTINUOUS PRN
Status: DISCONTINUED | OUTPATIENT
Start: 2020-07-29 | End: 2020-07-29 | Stop reason: SURG

## 2020-07-29 RX ORDER — ONDANSETRON 4 MG/1
4 TABLET, FILM COATED ORAL EVERY 6 HOURS PRN
Status: DISCONTINUED | OUTPATIENT
Start: 2020-07-29 | End: 2020-07-31 | Stop reason: HOSPADM

## 2020-07-29 RX ORDER — HYDROCODONE BITARTRATE AND ACETAMINOPHEN 7.5; 325 MG/1; MG/1
1 TABLET ORAL EVERY 4 HOURS PRN
Status: DISCONTINUED | OUTPATIENT
Start: 2020-07-29 | End: 2020-07-31 | Stop reason: HOSPADM

## 2020-07-29 RX ORDER — LISINOPRIL 5 MG/1
10 TABLET ORAL
Status: DISCONTINUED | OUTPATIENT
Start: 2020-07-29 | End: 2020-07-30

## 2020-07-29 RX ORDER — FENTANYL CITRATE 50 UG/ML
INJECTION, SOLUTION INTRAMUSCULAR; INTRAVENOUS AS NEEDED
Status: DISCONTINUED | OUTPATIENT
Start: 2020-07-29 | End: 2020-07-29 | Stop reason: SURG

## 2020-07-29 RX ADMIN — NICARDIPINE HYDROCHLORIDE 1 MG: 2.5 INJECTION INTRAVENOUS at 08:13

## 2020-07-29 RX ADMIN — MIDAZOLAM 2 MG: 1 INJECTION INTRAMUSCULAR; INTRAVENOUS at 07:31

## 2020-07-29 RX ADMIN — HYDROCHLOROTHIAZIDE 25 MG: 25 TABLET ORAL at 13:49

## 2020-07-29 RX ADMIN — SODIUM CHLORIDE 75 ML/HR: 900 INJECTION, SOLUTION INTRAVENOUS at 23:54

## 2020-07-29 RX ADMIN — CEFAZOLIN SODIUM 2 G: 1 INJECTION, POWDER, FOR SOLUTION INTRAMUSCULAR; INTRAVENOUS at 07:28

## 2020-07-29 RX ADMIN — HYDROCODONE BITARTRATE AND ACETAMINOPHEN 1 TABLET: 7.5; 325 TABLET ORAL at 13:23

## 2020-07-29 RX ADMIN — ROCURONIUM BROMIDE 20 MG: 10 INJECTION, SOLUTION INTRAVENOUS at 09:05

## 2020-07-29 RX ADMIN — ROCURONIUM BROMIDE 30 MG: 10 INJECTION, SOLUTION INTRAVENOUS at 08:23

## 2020-07-29 RX ADMIN — Medication 3 ML: at 13:26

## 2020-07-29 RX ADMIN — IPRATROPIUM BROMIDE AND ALBUTEROL SULFATE 3 ML: 2.5; .5 SOLUTION RESPIRATORY (INHALATION) at 23:53

## 2020-07-29 RX ADMIN — HYDROMORPHONE HYDROCHLORIDE 1 MG: 2 INJECTION INTRAMUSCULAR; INTRAVENOUS; SUBCUTANEOUS at 08:11

## 2020-07-29 RX ADMIN — HYDROMORPHONE HYDROCHLORIDE 1 MG: 2 INJECTION INTRAMUSCULAR; INTRAVENOUS; SUBCUTANEOUS at 09:21

## 2020-07-29 RX ADMIN — SODIUM CHLORIDE 75 ML/HR: 900 INJECTION, SOLUTION INTRAVENOUS at 13:30

## 2020-07-29 RX ADMIN — ONDANSETRON 4 MG: 2 INJECTION INTRAMUSCULAR; INTRAVENOUS at 07:47

## 2020-07-29 RX ADMIN — HYDROCODONE BITARTRATE AND ACETAMINOPHEN 1 TABLET: 7.5; 325 TABLET ORAL at 21:18

## 2020-07-29 RX ADMIN — CEFAZOLIN 2 G: 10 INJECTION, POWDER, FOR SOLUTION INTRAVENOUS at 17:14

## 2020-07-29 RX ADMIN — NICARDIPINE HYDROCHLORIDE 1 MG: 2.5 INJECTION INTRAVENOUS at 09:56

## 2020-07-29 RX ADMIN — LISINOPRIL 10 MG: 5 TABLET ORAL at 13:49

## 2020-07-29 RX ADMIN — Medication 10 ML: at 13:52

## 2020-07-29 RX ADMIN — CEFAZOLIN 2 G: 10 INJECTION, POWDER, FOR SOLUTION INTRAVENOUS at 23:54

## 2020-07-29 RX ADMIN — HYDROMORPHONE HYDROCHLORIDE 0.5 MG: 2 INJECTION, SOLUTION INTRAMUSCULAR; INTRAVENOUS; SUBCUTANEOUS at 10:45

## 2020-07-29 RX ADMIN — ROCURONIUM BROMIDE 50 MG: 10 INJECTION, SOLUTION INTRAVENOUS at 07:47

## 2020-07-29 RX ADMIN — SODIUM CHLORIDE: 0.9 INJECTION, SOLUTION INTRAVENOUS at 09:25

## 2020-07-29 RX ADMIN — Medication 0.6 MG: at 09:56

## 2020-07-29 RX ADMIN — Medication 3 MG: at 09:56

## 2020-07-29 RX ADMIN — DILTIAZEM HYDROCHLORIDE 30 MG: 30 TABLET ORAL at 23:54

## 2020-07-29 RX ADMIN — LABETALOL 20 MG/4 ML (5 MG/ML) INTRAVENOUS SYRINGE 10 MG: at 10:07

## 2020-07-29 RX ADMIN — SODIUM CHLORIDE: 0.9 INJECTION, SOLUTION INTRAVENOUS at 07:10

## 2020-07-29 RX ADMIN — Medication 10 ML: at 20:21

## 2020-07-29 RX ADMIN — PROPOFOL 100 MG: 10 INJECTION, EMULSION INTRAVENOUS at 07:47

## 2020-07-29 RX ADMIN — HYDROCODONE BITARTRATE AND ACETAMINOPHEN 1 TABLET: 7.5; 325 TABLET ORAL at 17:24

## 2020-07-29 RX ADMIN — SODIUM CHLORIDE, SODIUM LACTATE, POTASSIUM CHLORIDE, AND CALCIUM CHLORIDE 9 ML/HR: 600; 310; 30; 20 INJECTION, SOLUTION INTRAVENOUS at 06:38

## 2020-07-29 RX ADMIN — Medication 3 ML: at 20:21

## 2020-07-29 RX ADMIN — DILTIAZEM HYDROCHLORIDE 30 MG: 30 TABLET ORAL at 13:49

## 2020-07-29 RX ADMIN — DILTIAZEM HYDROCHLORIDE 30 MG: 30 TABLET ORAL at 17:14

## 2020-07-29 RX ADMIN — NICARDIPINE HYDROCHLORIDE 1 MG: 2.5 INJECTION INTRAVENOUS at 07:43

## 2020-07-29 RX ADMIN — LIDOCAINE HYDROCHLORIDE 50 MG: 20 INJECTION, SOLUTION EPIDURAL; INFILTRATION; INTRACAUDAL; PERINEURAL at 07:47

## 2020-07-29 RX ADMIN — FENTANYL CITRATE 100 MCG: 50 INJECTION, SOLUTION INTRAMUSCULAR; INTRAVENOUS at 07:47

## 2020-07-29 RX ADMIN — DEXAMETHASONE SODIUM PHOSPHATE 4 MG: 4 INJECTION, SOLUTION INTRAMUSCULAR; INTRAVENOUS at 07:47

## 2020-07-29 RX ADMIN — MORPHINE SULFATE 2 MG: 4 INJECTION INTRAVENOUS at 13:22

## 2020-07-29 RX ADMIN — HYDROMORPHONE HYDROCHLORIDE 0.5 MG: 2 INJECTION, SOLUTION INTRAMUSCULAR; INTRAVENOUS; SUBCUTANEOUS at 11:14

## 2020-07-29 RX ADMIN — Medication 0.2 MG: at 07:47

## 2020-07-29 RX ADMIN — HYDROMORPHONE HYDROCHLORIDE 0.5 MG: 2 INJECTION, SOLUTION INTRAMUSCULAR; INTRAVENOUS; SUBCUTANEOUS at 10:29

## 2020-07-29 NOTE — ANESTHESIA PREPROCEDURE EVALUATION
Anesthesia Evaluation     Patient summary reviewed and Nursing notes reviewed   NPO Solid Status: > 8 hours  NPO Liquid Status: > 8 hours           Airway   Mallampati: II  TM distance: >3 FB  Neck ROM: full  No difficulty expected  Dental - normal exam     Pulmonary - normal exam    breath sounds clear to auscultation  (+) pneumonia , sleep apnea on CPAP,   Cardiovascular - normal exam  Exercise tolerance: unable to assess    ECG reviewed  Rhythm: regular  Rate: normal    (+) hypertension,       Neuro/Psych  (+) psychiatric history Anxiety,     GI/Hepatic/Renal/Endo    (+) obesity,   liver disease,     Musculoskeletal     (+) back pain,   Abdominal  - normal exam   Substance History - negative use     OB/GYN negative ob/gyn ROS         Other      history of cancer remission    ROS/Med Hx Other: Hx of Corneal dryness, Fuchs dysytophy                  Anesthesia Plan    ASA 3     general   (AL, Lube eyes)  intravenous induction     Anesthetic plan, all risks, benefits, and alternatives have been provided, discussed and informed consent has been obtained with: patient.

## 2020-07-29 NOTE — ANESTHESIA POSTPROCEDURE EVALUATION
Patient: Leann Calix    Procedure Summary     Date:  07/29/20 Room / Location:  [facility] OR 09 / [facility] MAIN OR    Anesthesia Start:  0728 Anesthesia Stop:  1025    Procedure:  RIGHT VIDEO ASSISTED THORACOSCOPY WITH RIGHT MIDDLE LOBE THERAPUTIC WEDGE RESECTION AND RIGHT MIDDLE LOBECTOMY; HILAR LYMPH NODE DISSECTION (Right Chest) Diagnosis:       Multiple pulmonary nodules      (Multiple pulmonary nodules [R91.8])    Surgeon:  Jaden Fagan MD Provider:  Saw Bae MD    Anesthesia Type:  general ASA Status:  3          Anesthesia Type: general    Vitals  Vitals Value Taken Time   /61 7/29/2020 12:02 PM   Temp 97.3 °F (36.3 °C) 7/29/2020 10:15 AM   Pulse 47 7/29/2020 12:04 PM   Resp 12 7/29/2020 11:30 AM   SpO2 92 % 7/29/2020 12:04 PM   Vitals shown include unvalidated device data.        Post Anesthesia Care and Evaluation    Patient location during evaluation: PACU  Patient participation: complete - patient participated  Level of consciousness: awake  Pain scale: See nurse's notes for pain score.  Pain management: adequate  Airway patency: patent  Anesthetic complications: No anesthetic complications  PONV Status: none  Cardiovascular status: acceptable  Respiratory status: acceptable  Hydration status: acceptable    Comments: Patient seen and examined postoperatively; vital signs stable; SpO2 greater than or equal to 90%; cardiopulmonary status stable; nausea/vomiting adequately controlled; pain adequately controlled; no apparent anesthesia complications; patient discharged from anesthesia care when discharge criteria were met

## 2020-07-29 NOTE — ANESTHESIA PROCEDURE NOTES
Airway  Urgency: elective    Date/Time: 7/29/2020 7:47 AM  Airway not difficult    General Information and Staff    Patient location during procedure: OR  Anesthesiologist: Saw Bae MD    Indications and Patient Condition  Indications for airway management: airway protection    Preoxygenated: yes  Mask difficulty assessment: 0 - not attempted    Final Airway Details  Final airway type: endotracheal airway      Successful airway: ETT and EBT - double lumen left  Cuffed: yes   Successful intubation technique: direct laryngoscopy, RSI and flexible bronchoscopy  Facilitating devices/methods: intubating stylet  Endotracheal tube insertion site: oral  Blade: Emmanuel  Blade size: 3  ETT size (mm): 7.0  EBT DL size (fr): 35  Cormack-Lehane Classification: grade IIa - partial view of glottis  Placement verified by: chest auscultation, bronchoscopy, capnometry and palpation of cuff   Measured from: lips  Number of attempts at approach: 1  Assessment: lips, teeth, and gum same as pre-op and atraumatic intubation    Additional Comments  Confirmed by Dr Fagan pre and post positioning

## 2020-07-29 NOTE — ANESTHESIA PROCEDURE NOTES
Arterial Line      Patient reassessed immediately prior to procedure    Patient location during procedure: OR  Start time: 7/29/2020 7:49 AM  Stop Time:7/29/2020 7:54 AM       Line placed for hemodynamic monitoring.  Performed By   Anesthesiologist: Saw Bae MD  Preanesthetic Checklist  Completed: patient identified, site marked, surgical consent, pre-op evaluation, timeout performed, IV checked, risks and benefits discussed and monitors and equipment checked  Arterial Line Prep   Sterile Tech: gloves, cap and mask  Prep: ChloraPrep  Patient monitoring: continuous pulse oximetry, EKG and blood pressure monitoring  Arterial Line Procedure   Laterality:right  Location:  radial artery  Catheter size: 22 G   Guidance: ultrasound guided and palpation technique  Number of attempts: 1  Successful placement: yes  Post Assessment   Dressing Type: occlusive dressing applied, secured with tape and wrist guard applied.   Complications no  Circ/Move/Sens Assessment: normal.   Patient Tolerance: patient tolerated the procedure well with no apparent complications

## 2020-07-30 ENCOUNTER — APPOINTMENT (OUTPATIENT)
Dept: GENERAL RADIOLOGY | Facility: HOSPITAL | Age: 80
End: 2020-07-30

## 2020-07-30 PROBLEM — E66.01 MORBID (SEVERE) OBESITY DUE TO EXCESS CALORIES (HCC): Status: RESOLVED | Noted: 2019-04-04 | Resolved: 2020-07-30

## 2020-07-30 PROBLEM — G47.30 SLEEP APNEA: Chronic | Status: ACTIVE | Noted: 2020-07-30

## 2020-07-30 PROBLEM — I10 HYPERTENSION: Chronic | Status: ACTIVE | Noted: 2019-06-27

## 2020-07-30 PROBLEM — E66.9 OBESITY (BMI 30-39.9): Chronic | Status: ACTIVE | Noted: 2020-07-30

## 2020-07-30 LAB
ANION GAP SERPL CALCULATED.3IONS-SCNC: 13 MMOL/L (ref 5–15)
BASOPHILS # BLD AUTO: 0 10*3/MM3 (ref 0–0.2)
BASOPHILS NFR BLD AUTO: 0.4 % (ref 0–1.5)
BUN SERPL-MCNC: 9 MG/DL (ref 8–23)
BUN SERPL-MCNC: ABNORMAL MG/DL
BUN/CREAT SERPL: ABNORMAL
CALCIUM SPEC-SCNC: 9.2 MG/DL (ref 8.6–10.5)
CHLORIDE SERPL-SCNC: 99 MMOL/L (ref 98–107)
CO2 SERPL-SCNC: 24 MMOL/L (ref 22–29)
CREAT SERPL-MCNC: 0.75 MG/DL (ref 0.57–1)
DEPRECATED RDW RBC AUTO: 44.2 FL (ref 37–54)
EOSINOPHIL # BLD AUTO: 0 10*3/MM3 (ref 0–0.4)
EOSINOPHIL NFR BLD AUTO: 0 % (ref 0.3–6.2)
ERYTHROCYTE [DISTWIDTH] IN BLOOD BY AUTOMATED COUNT: 13.1 % (ref 12.3–15.4)
GFR SERPL CREATININE-BSD FRML MDRD: 75 ML/MIN/1.73
GLUCOSE SERPL-MCNC: 123 MG/DL (ref 65–99)
HCT VFR BLD AUTO: 37.1 % (ref 34–46.6)
HGB BLD-MCNC: 12.3 G/DL (ref 12–15.9)
LAB AP CASE REPORT: NORMAL
LAB AP CLINICAL INFORMATION: NORMAL
LAB AP SYNOPTIC CHECKLIST: NORMAL
LYMPHOCYTES # BLD AUTO: 1.1 10*3/MM3 (ref 0.7–3.1)
LYMPHOCYTES NFR BLD AUTO: 9.2 % (ref 19.6–45.3)
Lab: NORMAL
MAGNESIUM SERPL-MCNC: 1.9 MG/DL (ref 1.6–2.4)
MCH RBC QN AUTO: 31.6 PG (ref 26.6–33)
MCHC RBC AUTO-ENTMCNC: 33.3 G/DL (ref 31.5–35.7)
MCV RBC AUTO: 95.1 FL (ref 79–97)
MONOCYTES # BLD AUTO: 1.3 10*3/MM3 (ref 0.1–0.9)
MONOCYTES NFR BLD AUTO: 10.6 % (ref 5–12)
NEUTROPHILS NFR BLD AUTO: 79.8 % (ref 42.7–76)
NEUTROPHILS NFR BLD AUTO: 9.8 10*3/MM3 (ref 1.7–7)
NRBC BLD AUTO-RTO: 0 /100 WBC (ref 0–0.2)
PATH REPORT.FINAL DX SPEC: NORMAL
PATH REPORT.GROSS SPEC: NORMAL
PHOSPHATE SERPL-MCNC: 3 MG/DL (ref 2.5–4.5)
PLATELET # BLD AUTO: 215 10*3/MM3 (ref 140–450)
PMV BLD AUTO: 7.5 FL (ref 6–12)
POTASSIUM SERPL-SCNC: 3.7 MMOL/L (ref 3.5–5.2)
RBC # BLD AUTO: 3.9 10*6/MM3 (ref 3.77–5.28)
SODIUM SERPL-SCNC: 136 MMOL/L (ref 136–145)
WBC # BLD AUTO: 12.2 10*3/MM3 (ref 3.4–10.8)

## 2020-07-30 PROCEDURE — 25010000002 HEPARIN (PORCINE) PER 1000 UNITS: Performed by: THORACIC SURGERY (CARDIOTHORACIC VASCULAR SURGERY)

## 2020-07-30 PROCEDURE — 97162 PT EVAL MOD COMPLEX 30 MIN: CPT

## 2020-07-30 PROCEDURE — 71045 X-RAY EXAM CHEST 1 VIEW: CPT

## 2020-07-30 PROCEDURE — 94799 UNLISTED PULMONARY SVC/PX: CPT

## 2020-07-30 PROCEDURE — 83735 ASSAY OF MAGNESIUM: CPT | Performed by: NURSE PRACTITIONER

## 2020-07-30 PROCEDURE — 85025 COMPLETE CBC W/AUTO DIFF WBC: CPT | Performed by: NURSE PRACTITIONER

## 2020-07-30 PROCEDURE — 99221 1ST HOSP IP/OBS SF/LOW 40: CPT | Performed by: INTERNAL MEDICINE

## 2020-07-30 PROCEDURE — 84100 ASSAY OF PHOSPHORUS: CPT | Performed by: NURSE PRACTITIONER

## 2020-07-30 PROCEDURE — 25010000003 MAGNESIUM SULFATE 4 GM/100ML SOLUTION: Performed by: NURSE PRACTITIONER

## 2020-07-30 PROCEDURE — 99024 POSTOP FOLLOW-UP VISIT: CPT | Performed by: THORACIC SURGERY (CARDIOTHORACIC VASCULAR SURGERY)

## 2020-07-30 PROCEDURE — 25010000002 MORPHINE PER 10 MG: Performed by: THORACIC SURGERY (CARDIOTHORACIC VASCULAR SURGERY)

## 2020-07-30 PROCEDURE — 80048 BASIC METABOLIC PNL TOTAL CA: CPT | Performed by: NURSE PRACTITIONER

## 2020-07-30 RX ORDER — LISINOPRIL 20 MG/1
20 TABLET ORAL
Status: DISCONTINUED | OUTPATIENT
Start: 2020-07-31 | End: 2020-07-31 | Stop reason: HOSPADM

## 2020-07-30 RX ORDER — IBUPROFEN 200 MG
CAPSULE ORAL ONCE AS NEEDED
Status: DISCONTINUED | OUTPATIENT
Start: 2020-07-30 | End: 2020-07-31 | Stop reason: HOSPADM

## 2020-07-30 RX ORDER — HYDROCHLOROTHIAZIDE 12.5 MG/1
12.5 TABLET ORAL DAILY
Status: DISCONTINUED | OUTPATIENT
Start: 2020-07-31 | End: 2020-07-31 | Stop reason: HOSPADM

## 2020-07-30 RX ORDER — HYDRALAZINE HYDROCHLORIDE 20 MG/ML
10 INJECTION INTRAMUSCULAR; INTRAVENOUS EVERY 6 HOURS PRN
Status: DISCONTINUED | OUTPATIENT
Start: 2020-07-30 | End: 2020-07-31 | Stop reason: HOSPADM

## 2020-07-30 RX ORDER — MULTIPLE VITAMINS W/ MINERALS TAB 2148-113
1 TAB ORAL DAILY
Status: DISCONTINUED | OUTPATIENT
Start: 2020-07-30 | End: 2020-07-31 | Stop reason: HOSPADM

## 2020-07-30 RX ORDER — DILTIAZEM HYDROCHLORIDE 180 MG/1
180 CAPSULE, COATED, EXTENDED RELEASE ORAL NIGHTLY
Status: DISCONTINUED | OUTPATIENT
Start: 2020-07-30 | End: 2020-07-31 | Stop reason: HOSPADM

## 2020-07-30 RX ADMIN — LISINOPRIL 10 MG: 5 TABLET ORAL at 08:01

## 2020-07-30 RX ADMIN — HYDROCODONE BITARTRATE AND ACETAMINOPHEN 1 TABLET: 7.5; 325 TABLET ORAL at 06:04

## 2020-07-30 RX ADMIN — MORPHINE SULFATE 2 MG: 4 INJECTION INTRAVENOUS at 06:04

## 2020-07-30 RX ADMIN — IPRATROPIUM BROMIDE AND ALBUTEROL SULFATE 3 ML: 2.5; .5 SOLUTION RESPIRATORY (INHALATION) at 23:21

## 2020-07-30 RX ADMIN — HEPARIN SODIUM 5000 UNITS: 5000 INJECTION INTRAVENOUS; SUBCUTANEOUS at 20:44

## 2020-07-30 RX ADMIN — HYDROCODONE BITARTRATE AND ACETAMINOPHEN 1 TABLET: 7.5; 325 TABLET ORAL at 21:56

## 2020-07-30 RX ADMIN — Medication 10 ML: at 20:44

## 2020-07-30 RX ADMIN — MAGNESIUM SULFATE HEPTAHYDRATE 4 G: 40 INJECTION, SOLUTION INTRAVENOUS at 05:20

## 2020-07-30 RX ADMIN — HEPARIN SODIUM 5000 UNITS: 5000 INJECTION INTRAVENOUS; SUBCUTANEOUS at 08:01

## 2020-07-30 RX ADMIN — Medication 10 ML: at 08:06

## 2020-07-30 RX ADMIN — HYDROCHLOROTHIAZIDE 25 MG: 25 TABLET ORAL at 08:01

## 2020-07-30 RX ADMIN — HYDROCODONE BITARTRATE AND ACETAMINOPHEN 1 TABLET: 7.5; 325 TABLET ORAL at 01:43

## 2020-07-30 RX ADMIN — DILTIAZEM HYDROCHLORIDE 30 MG: 30 TABLET ORAL at 06:04

## 2020-07-30 RX ADMIN — IPRATROPIUM BROMIDE AND ALBUTEROL SULFATE 3 ML: 2.5; .5 SOLUTION RESPIRATORY (INHALATION) at 16:12

## 2020-07-30 RX ADMIN — DILTIAZEM HYDROCHLORIDE 180 MG: 180 CAPSULE, COATED, EXTENDED RELEASE ORAL at 20:44

## 2020-07-31 ENCOUNTER — READMISSION MANAGEMENT (OUTPATIENT)
Dept: CALL CENTER | Facility: HOSPITAL | Age: 80
End: 2020-07-31

## 2020-07-31 ENCOUNTER — APPOINTMENT (OUTPATIENT)
Dept: GENERAL RADIOLOGY | Facility: HOSPITAL | Age: 80
End: 2020-07-31

## 2020-07-31 VITALS
BODY MASS INDEX: 32.86 KG/M2 | OXYGEN SATURATION: 93 % | HEART RATE: 93 BPM | SYSTOLIC BLOOD PRESSURE: 144 MMHG | WEIGHT: 192.46 LBS | RESPIRATION RATE: 18 BRPM | TEMPERATURE: 98.5 F | HEIGHT: 64 IN | DIASTOLIC BLOOD PRESSURE: 82 MMHG

## 2020-07-31 LAB
ANION GAP SERPL CALCULATED.3IONS-SCNC: 13 MMOL/L (ref 5–15)
BASOPHILS # BLD AUTO: 0.1 10*3/MM3 (ref 0–0.2)
BASOPHILS NFR BLD AUTO: 0.6 % (ref 0–1.5)
BUN SERPL-MCNC: 19 MG/DL (ref 8–23)
BUN SERPL-MCNC: ABNORMAL MG/DL
BUN/CREAT SERPL: ABNORMAL
CALCIUM SPEC-SCNC: 9.4 MG/DL (ref 8.6–10.5)
CHLORIDE SERPL-SCNC: 102 MMOL/L (ref 98–107)
CO2 SERPL-SCNC: 29 MMOL/L (ref 22–29)
CREAT SERPL-MCNC: 0.84 MG/DL (ref 0.57–1)
DEPRECATED RDW RBC AUTO: 43.3 FL (ref 37–54)
EOSINOPHIL # BLD AUTO: 0.1 10*3/MM3 (ref 0–0.4)
EOSINOPHIL NFR BLD AUTO: 0.7 % (ref 0.3–6.2)
ERYTHROCYTE [DISTWIDTH] IN BLOOD BY AUTOMATED COUNT: 13.1 % (ref 12.3–15.4)
GFR SERPL CREATININE-BSD FRML MDRD: 65 ML/MIN/1.73
GLUCOSE SERPL-MCNC: 106 MG/DL (ref 65–99)
HCT VFR BLD AUTO: 38.1 % (ref 34–46.6)
HGB BLD-MCNC: 12.9 G/DL (ref 12–15.9)
LYMPHOCYTES # BLD AUTO: 2.5 10*3/MM3 (ref 0.7–3.1)
LYMPHOCYTES NFR BLD AUTO: 24 % (ref 19.6–45.3)
MAGNESIUM SERPL-MCNC: 2.3 MG/DL (ref 1.6–2.4)
MCH RBC QN AUTO: 32 PG (ref 26.6–33)
MCHC RBC AUTO-ENTMCNC: 33.8 G/DL (ref 31.5–35.7)
MCV RBC AUTO: 94.7 FL (ref 79–97)
MONOCYTES # BLD AUTO: 1.1 10*3/MM3 (ref 0.1–0.9)
MONOCYTES NFR BLD AUTO: 10 % (ref 5–12)
NEUTROPHILS NFR BLD AUTO: 6.8 10*3/MM3 (ref 1.7–7)
NEUTROPHILS NFR BLD AUTO: 64.7 % (ref 42.7–76)
NRBC BLD AUTO-RTO: 0.1 /100 WBC (ref 0–0.2)
PHOSPHATE SERPL-MCNC: 3.7 MG/DL (ref 2.5–4.5)
PLATELET # BLD AUTO: 263 10*3/MM3 (ref 140–450)
PMV BLD AUTO: 7.5 FL (ref 6–12)
POTASSIUM SERPL-SCNC: 3.8 MMOL/L (ref 3.5–5.2)
RBC # BLD AUTO: 4.02 10*6/MM3 (ref 3.77–5.28)
SODIUM SERPL-SCNC: 144 MMOL/L (ref 136–145)
WBC # BLD AUTO: 10.6 10*3/MM3 (ref 3.4–10.8)

## 2020-07-31 PROCEDURE — 85025 COMPLETE CBC W/AUTO DIFF WBC: CPT | Performed by: NURSE PRACTITIONER

## 2020-07-31 PROCEDURE — 84100 ASSAY OF PHOSPHORUS: CPT | Performed by: NURSE PRACTITIONER

## 2020-07-31 PROCEDURE — 25010000002 HEPARIN (PORCINE) PER 1000 UNITS: Performed by: THORACIC SURGERY (CARDIOTHORACIC VASCULAR SURGERY)

## 2020-07-31 PROCEDURE — 71045 X-RAY EXAM CHEST 1 VIEW: CPT

## 2020-07-31 PROCEDURE — 80048 BASIC METABOLIC PNL TOTAL CA: CPT | Performed by: NURSE PRACTITIONER

## 2020-07-31 PROCEDURE — 99232 SBSQ HOSP IP/OBS MODERATE 35: CPT | Performed by: INTERNAL MEDICINE

## 2020-07-31 PROCEDURE — 94618 PULMONARY STRESS TESTING: CPT

## 2020-07-31 PROCEDURE — 94799 UNLISTED PULMONARY SVC/PX: CPT

## 2020-07-31 PROCEDURE — 83735 ASSAY OF MAGNESIUM: CPT | Performed by: NURSE PRACTITIONER

## 2020-07-31 RX ORDER — IBUPROFEN 200 MG
CAPSULE ORAL
Status: DISCONTINUED | OUTPATIENT
Start: 2020-07-31 | End: 2020-07-31 | Stop reason: HOSPADM

## 2020-07-31 RX ADMIN — HEPARIN SODIUM 5000 UNITS: 5000 INJECTION INTRAVENOUS; SUBCUTANEOUS at 10:13

## 2020-07-31 RX ADMIN — IPRATROPIUM BROMIDE AND ALBUTEROL SULFATE 3 ML: 2.5; .5 SOLUTION RESPIRATORY (INHALATION) at 08:00

## 2020-07-31 RX ADMIN — MULTIPLE VITAMINS W/ MINERALS TAB 1 TABLET: TAB at 10:12

## 2020-07-31 RX ADMIN — HYDROCODONE BITARTRATE AND ACETAMINOPHEN 1 TABLET: 7.5; 325 TABLET ORAL at 06:34

## 2020-07-31 RX ADMIN — HYDROCHLOROTHIAZIDE 12.5 MG: 12.5 TABLET ORAL at 10:13

## 2020-07-31 RX ADMIN — Medication 3 ML: at 09:00

## 2020-07-31 RX ADMIN — Medication 10 ML: at 09:00

## 2020-07-31 RX ADMIN — LISINOPRIL 20 MG: 20 TABLET ORAL at 10:13

## 2020-08-03 ENCOUNTER — TRANSITIONAL CARE MANAGEMENT TELEPHONE ENCOUNTER (OUTPATIENT)
Dept: CALL CENTER | Facility: HOSPITAL | Age: 80
End: 2020-08-03

## 2020-08-03 NOTE — PROGRESS NOTES
Case Management Discharge Note      Final Note: home with family                   Final Discharge Disposition Code: 01 - home or self-care

## 2020-08-03 NOTE — OUTREACH NOTE
Call Center TCM Note      Responses   [facility] patient discharged from?  [facility]   Does the patient have one of the following disease processes/diagnoses(primary or secondary)?  Cardiothoracic surgery   TCM attempt successful?  Yes   Call start time  0913   Call end time  0916   Discharge diagnosis  right VAT with lobe wedge resection for CA   Meds reviewed with patient/caregiver?  Yes   Is the patient having any side effects they believe may be caused by any medication additions or changes?  No   Does the patient have all medications related to this admission filled (includes all antibiotics, pain medications, cardiac medications, etc.)  Yes   Is the patient taking all medications as directed (includes completed medication regime)?  Yes   Does the patient have a primary care provider?   Yes   Does the patient have an appointment scheduled with their C/T surgeon?  Yes   Comments regarding PCP  she will be following up with her CT Surgeon   Has the patient kept scheduled appointments due by today?  N/A   Psychosocial issues?  No   Did the patient receive a copy of their discharge instructions?  Yes   Nursing interventions  Reviewed instructions with patient   What is the patient's perception of their health status since discharge?  Improving   Nursing interventions  Nurse provided patient education   Is the patient /caregiver able to teach back basic post-op care?  Do not remove steri-strips, Lifting as instructed by MD in discharge instructions, Keep incision areas clean, dry and protected, No tub bath, swimming, or hot tub until instructed by MD, Take showers only when approved by MD-sponge bathe until then, Drive as instructed by MD in discharge instructions, Practice 'cough and deep breath', Continue use of incentive spirometry at least 1 week post discharge   Is the patient/caregiver able to teach back the hierarchy of who to call/visit for symptoms/problems? PCP, Specialist, Home health nurse, Urgent  [facility], ED, 911  Yes   TCM call completed?  Yes   Wrap up additional comments  Patient says she is doing well, no concerns at this time, she will be following up with her CT surgeon.          Kellie Hameed RN    8/3/2020, 09:16

## 2020-08-11 ENCOUNTER — READMISSION MANAGEMENT (OUTPATIENT)
Dept: CALL CENTER | Facility: HOSPITAL | Age: 80
End: 2020-08-11

## 2020-08-11 ENCOUNTER — OFFICE VISIT (OUTPATIENT)
Dept: SURGERY | Facility: CLINIC | Age: 80
End: 2020-08-11

## 2020-08-11 VITALS
SYSTOLIC BLOOD PRESSURE: 148 MMHG | OXYGEN SATURATION: 95 % | BODY MASS INDEX: 33.12 KG/M2 | HEART RATE: 97 BPM | WEIGHT: 194 LBS | TEMPERATURE: 97.5 F | HEIGHT: 64 IN | DIASTOLIC BLOOD PRESSURE: 77 MMHG

## 2020-08-11 DIAGNOSIS — R91.1 RIGHT MIDDLE LOBE PULMONARY NODULE: Primary | ICD-10-CM

## 2020-08-11 DIAGNOSIS — C34.2 PRIMARY CANCER OF RIGHT MIDDLE LOBE OF LUNG (HCC): ICD-10-CM

## 2020-08-11 PROCEDURE — 99212 OFFICE O/P EST SF 10 MIN: CPT | Performed by: THORACIC SURGERY (CARDIOTHORACIC VASCULAR SURGERY)

## 2020-08-11 NOTE — OUTREACH NOTE
CT Surgery Week 2 Survey      Responses   [facility] patient discharged from?  [facility]   Does the patient have one of the following disease processes/diagnoses(primary or secondary)?  Cardiothoracic surgery   Week 2 attempt successful?  No   Unsuccessful attempts  Attempt 1          Zac Sheets RN

## 2020-08-12 PROCEDURE — 93010 ELECTROCARDIOGRAM REPORT: CPT | Performed by: INTERNAL MEDICINE

## 2020-08-13 ENCOUNTER — READMISSION MANAGEMENT (OUTPATIENT)
Dept: CALL CENTER | Facility: HOSPITAL | Age: 80
End: 2020-08-13

## 2020-08-13 NOTE — OUTREACH NOTE
CT Surgery Week 2 Survey      Responses   [facility] patient discharged from?  [facility]   Does the patient have one of the following disease processes/diagnoses(primary or secondary)?  Cardiothoracic surgery   Week 2 attempt successful?  Yes   Call start time  1123   Call end time  1128   Discharge diagnosis  right VAT with lobe wedge resection for CA   Does the patient have all medications related to this admission filled (includes all antibiotics, pain medications, cardiac medications, etc.)  N/A   Is the patient taking all medications as directed (includes completed medication regime)?  N/A   Does the patient have an appointment scheduled with their C/T surgeon?  Yes   Has the patient kept scheduled appointments due by today?  Yes   Has home health visited the patient within 72 hours of discharge?  N/A   Psychosocial issues?  No   Nursing interventions  Reviewed instructions with patient   What is the patient's perception of their health status since discharge?  New symptoms unrelated to diagnosis [States has developed a contact dermatitis on back, spray recommended by surgeon]   Nursing interventions  Reassured on normal signs of recovery   Is the patient/caregiver able to teach back steps to recovery at home?  Set small, achievable goals for return to baseline health   Week 2 call completed?  Yes   Revoked  No further contact(revokes)-requires comment   Graduated/Revoked comments  Doing great, has seen surgeon and has no other concerns or needs.          Kathi Christina RN

## 2020-08-18 DIAGNOSIS — F51.01 PRIMARY INSOMNIA: ICD-10-CM

## 2020-08-19 NOTE — TELEPHONE ENCOUNTER
Is she still taking temazepam.  I got a refill request for it but it was taken off of her medicine list.

## 2020-08-20 ENCOUNTER — TELEPHONE (OUTPATIENT)
Dept: FAMILY MEDICINE CLINIC | Facility: CLINIC | Age: 80
End: 2020-08-20

## 2020-08-20 NOTE — TELEPHONE ENCOUNTER
[facility] sent a refill request for her Temazepam 15 mg. Looks like it did not get signed off on.

## 2020-08-21 DIAGNOSIS — F51.01 PRIMARY INSOMNIA: Primary | ICD-10-CM

## 2020-08-21 RX ORDER — TEMAZEPAM 15 MG/1
15 CAPSULE ORAL NIGHTLY PRN
Qty: 30 CAPSULE | Refills: 1 | Status: SHIPPED | OUTPATIENT
Start: 2020-08-21 | End: 2020-10-15 | Stop reason: SDUPTHER

## 2020-08-28 RX ORDER — TEMAZEPAM 15 MG/1
CAPSULE ORAL
Qty: 30 CAPSULE | Refills: 0 | OUTPATIENT
Start: 2020-08-28

## 2020-09-13 ENCOUNTER — PATIENT MESSAGE (OUTPATIENT)
Dept: CARDIOLOGY | Facility: CLINIC | Age: 80
End: 2020-09-13

## 2020-09-14 RX ORDER — DILTIAZEM HYDROCHLORIDE 180 MG/1
180 CAPSULE, EXTENDED RELEASE ORAL DAILY
Qty: 90 CAPSULE | Refills: 1 | Status: SHIPPED | OUTPATIENT
Start: 2020-09-14 | End: 2021-03-01 | Stop reason: SDUPTHER

## 2020-10-15 DIAGNOSIS — F51.01 PRIMARY INSOMNIA: ICD-10-CM

## 2020-10-16 RX ORDER — TEMAZEPAM 15 MG/1
15 CAPSULE ORAL NIGHTLY PRN
Qty: 30 CAPSULE | Refills: 1 | Status: SHIPPED | OUTPATIENT
Start: 2020-10-16 | End: 2020-12-18 | Stop reason: SDUPTHER

## 2020-10-26 ENCOUNTER — OFFICE VISIT (OUTPATIENT)
Dept: CARDIOLOGY | Facility: CLINIC | Age: 80
End: 2020-10-26

## 2020-10-26 VITALS
BODY MASS INDEX: 34.66 KG/M2 | DIASTOLIC BLOOD PRESSURE: 76 MMHG | HEART RATE: 71 BPM | SYSTOLIC BLOOD PRESSURE: 154 MMHG | OXYGEN SATURATION: 98 % | WEIGHT: 203 LBS | HEIGHT: 64 IN

## 2020-10-26 DIAGNOSIS — G47.33 OBSTRUCTIVE SLEEP APNEA: ICD-10-CM

## 2020-10-26 DIAGNOSIS — I10 ESSENTIAL HYPERTENSION: Primary | ICD-10-CM

## 2020-10-26 PROCEDURE — 99213 OFFICE O/P EST LOW 20 MIN: CPT | Performed by: INTERNAL MEDICINE

## 2020-10-26 NOTE — PROGRESS NOTES
Subjective:     Encounter Date:10/26/2020      Patient ID: Leann Calix is a 80 y.o. female.    Chief Complaint:  History of Present Illness 80-year-old white female with history of hypertension sleep apnea presents to my office for follow-up.  Patient is currently stable without incidence of chest pain or shortness of breath at rest on exertion.  No complaints any PND orthopnea.  No palpitation dizziness syncope or swelling of the feet.  Patient has been taking all the medicines daily.  Patient does not smoke.  Patient is trying to exercise regular.  Patient follows a good diet.    The following portions of the patient's history were reviewed and updated as appropriate: allergies, current medications, past family history, past medical history, past social history, past surgical history and problem list.  Past Medical History:   Diagnosis Date   • Allergic 2007    Bisoprolol   • Back pain     LOW RADICULAR PAIN LEFT LEG   • Chronic insomnia    • Fatty liver    • H/O degenerative disc disease    • Hypertension     DR ONEAL   • Obesity 1985    I'm now losing weight on HMR program   • Osteoarthritis 2002   • Pneumonia 1945   • Sleep apnea     uses BiPAP   • Spondylolisthesis 1989    Surgery   • Uterine cancer (CMS/HCC) 1984     Past Surgical History:   Procedure Laterality Date   • BACK SURGERY  1989/2002/2009    Spondylolisthesis   • BREAST BIOPSY  1971   • CARDIAC CATHETERIZATION  06/2018    NL DR ONEAL   • CHOLECYSTECTOMY  1996   • COLONOSCOPY  2016    Clean colon found.   • EYE SURGERY Bilateral 2006    Fausto laser   • HIP ARTHROPLASTY Right 06/07/2010    DR PACKER   • HYSTERECTOMY  1984    CARCINOMA IN SITU   • JOINT REPLACEMENT  2007    First knee replacement.   • LAMINECTOMY  1989    During first back surgery.   • LUMBAR DISCECTOMY  2006    DR VOGEL   • LUMBAR FUSION  2002    L3/5 DR PAPPAS   • LUMBAR LAMINECTOMY  1989    LUMBAR SPINAL FUSION L4/L5 DR BRUCE   • OOPHORECTOMY Right 1996   •  "PAROTIDECTOMY Right 2004   • PARTIAL HIP ARTHROPLASTY  03/16/2017    Dr Michel   • REVISION TOTAL KNEE ARTHROPLASTY Left 03/16/2017    DR MICHEL   • ROTATOR CUFF REPAIR Right 2003   • SHOULDER SURGERY  2003    Right rotator cuff.   • SPINAL FUSION  1989    Spondylolisthesis   • SPINE SURGERY  1989    Spondylolisthesis   • THORACOSCOPY Right 7/29/2020    Procedure: RIGHT VIDEO ASSISTED THORACOSCOPY WITH RIGHT MIDDLE LOBE THERAPUTIC WEDGE RESECTION AND RIGHT MIDDLE LOBECTOMY; HILAR LYMPH NODE DISSECTION;  Surgeon: Jaden Fagan MD;  Location: [facility] MAIN OR;  Service: Cardiothoracic;  Laterality: Right;   • THYROID BIOPSY  01/2018    X5   • THYROID SURGERY      nodules removed   • TOTAL KNEE ARTHROPLASTY  2009    DR MICHEL   • TUBAL ABDOMINAL LIGATION Bilateral 1976     /76 (BP Location: Left arm, Patient Position: Sitting)   Pulse 71   Ht 162.6 cm (64\")   Wt 92.1 kg (203 lb)   SpO2 98%   BMI 34.84 kg/m²   Family History   Problem Relation Age of Onset   • Cancer Mother         Don't know where cancer began.   • Kidney disease Mother    • Thyroid disease Sister    • Allergies Sister    • Allergies Brother    • Cancer Daughter         Ewings Sarcoma   • Other Daughter         EWINGS SARCOMA   • Cancer Other    • Alcohol abuse Maternal Grandfather    • Arthritis Maternal Grandmother    • Cancer Maternal Aunt         Breast cancer       Current Outpatient Medications:   •  Cartia  MG 24 hr capsule, Take 1 capsule by mouth Daily., Disp: 90 capsule, Rfl: 1  •  lisinopril-hydrochlorothiazide (PRINZIDE,ZESTORETIC) 20-12.5 MG per tablet, Take 1 tablet by mouth Daily. Last dose 7/27 by 0800, Disp: , Rfl:   •  Multiple Vitamins-Minerals (MULTIVITAMIN ADULTS PO), Take 1 tablet by mouth Daily., Disp: , Rfl:   •  Omega-3 Fatty Acids (FISH OIL) 1000 MG capsule capsule, Take 1,000 mg by mouth Daily With Breakfast., Disp: , Rfl:   •  temazepam (RESTORIL) 15 MG capsule, Take 1 capsule by mouth At Night As Needed for " Sleep., Disp: 30 capsule, Rfl: 1  •  Unable to find, CPAP MACHINE, Disp: , Rfl:   Allergies   Allergen Reactions   • Beta Adrenergic Blockers Other (See Comments)     Couldn't breathe or move     Social History     Socioeconomic History   • Marital status:      Spouse name: Not on file   • Number of children: Not on file   • Years of education: Not on file   • Highest education level: Not on file   Tobacco Use   • Smoking status: Former Smoker     Packs/day: 1.00     Years: 30.00     Pack years: 30.00     Types: Cigarettes     Start date: 1976     Quit date: 2006     Years since quittin.3   • Smokeless tobacco: Never Used   Substance and Sexual Activity   • Alcohol use: No     Frequency: Never   • Drug use: No   • Sexual activity: Defer     Review of Systems   Constitution: Negative for fever and malaise/fatigue.   Cardiovascular: Negative for chest pain, dyspnea on exertion and palpitations.   Respiratory: Negative for cough and shortness of breath.    Skin: Negative for rash.   Gastrointestinal: Negative for abdominal pain, nausea and vomiting.   Neurological: Negative for focal weakness and headaches.   All other systems reviewed and are negative.             Objective:     Constitutional:       Appearance: Well-developed.   Eyes:      General: No scleral icterus.     Conjunctiva/sclera: Conjunctivae normal.   HENT:      Head: Normocephalic and atraumatic.   Neck:      Musculoskeletal: Normal range of motion and neck supple.      Vascular: No carotid bruit or JVD.   Pulmonary:      Effort: Pulmonary effort is normal.      Breath sounds: Normal breath sounds. No wheezing. No rales.   Cardiovascular:      Normal rate. Regular rhythm.   Pulses:     Intact distal pulses.   Abdominal:      General: Bowel sounds are normal.      Palpations: Abdomen is soft.   Skin:     General: Skin is warm and dry.      Findings: No rash.   Neurological:      Mental Status: Alert.       Procedures    Lab Review:        Assessment:          Diagnosis Plan   1. Essential hypertension     2. Obstructive sleep apnea            Plan:     Patient has history of hypertension and sleep apnea and is currently stable on medical therapy  Patient also has sleep apnea using a CPAP machine.

## 2020-11-24 ENCOUNTER — OFFICE VISIT (OUTPATIENT)
Dept: FAMILY MEDICINE CLINIC | Facility: CLINIC | Age: 80
End: 2020-11-24

## 2020-11-24 VITALS
WEIGHT: 198 LBS | TEMPERATURE: 97.3 F | HEART RATE: 87 BPM | SYSTOLIC BLOOD PRESSURE: 186 MMHG | HEIGHT: 65 IN | DIASTOLIC BLOOD PRESSURE: 80 MMHG | OXYGEN SATURATION: 97 % | BODY MASS INDEX: 32.99 KG/M2

## 2020-11-24 DIAGNOSIS — Z12.31 ENCOUNTER FOR SCREENING MAMMOGRAM FOR BREAST CANCER: ICD-10-CM

## 2020-11-24 DIAGNOSIS — M54.17 LUMBOSACRAL RADICULOPATHY: ICD-10-CM

## 2020-11-24 DIAGNOSIS — Z00.00 MEDICARE ANNUAL WELLNESS VISIT, SUBSEQUENT: Primary | ICD-10-CM

## 2020-11-24 DIAGNOSIS — E78.5 HYPERLIPIDEMIA, UNSPECIFIED HYPERLIPIDEMIA TYPE: ICD-10-CM

## 2020-11-24 PROCEDURE — G0439 PPPS, SUBSEQ VISIT: HCPCS | Performed by: FAMILY MEDICINE

## 2020-11-24 NOTE — PROGRESS NOTES
Medicare Wellness Visit   The ABC's of the Annual Wellness Visit    Chief Complaint   Patient presents with   • Medicare Wellness-subsequent       HPI:  FADY Hutchison-1940, is a 80 y.o. female who presents for a Medicare Wellness Visit.    Recent Hospitalizations:  Recently treated at the following:  [facility]  for removal of right lung cancer in 2020.    Current Medical Providers:  Patient Care Team:  Ryann Simon MD as PCP - General  Ryann Simon MD as PCP - Family Medicine  Thuan Hickman MD as Consulting Physician (Cardiology)  Eddie Meeks MD as Consulting Physician (Otolaryngology)  Emi Leary APRN as Nurse Practitioner (Nurse Practitioner)  Jaden Fagan MD as Surgeon (Thoracic Surgery)    Health Habits and Functional and Cognitive Screening and Depression Screening:  Functional & Cognitive Status 2020   Do you have difficulty preparing food and eating? No   Do you have difficulty bathing yourself, getting dressed or grooming yourself? No   Do you have difficulty using the toilet? No   Do you have difficulty moving around from place to place? No   Do you have trouble with steps or getting out of a bed or a chair? No   Current Diet Low Fat Diet   Dental Exam Up to date   Eye Exam Up to date   Exercise (times per week) 2 times per week   Current Exercise Activities Include -   Do you need help using the phone?  No   Are you deaf or do you have serious difficulty hearing?  No   Do you need help with transportation? No   Do you need help shopping? No   Do you need help preparing meals?  No   Do you need help with housework?  No   Do you need help with laundry? No   Do you need help taking your medications? No   Do you need help managing money? -   Do you ever drive or ride in a car without wearing a seat belt? No   Have you felt unusual stress, anger or loneliness in the last month? No   Who do you live with? Alone   If you need help, do you have  trouble finding someone available to you? No   Have you been bothered in the last four weeks by sexual problems? -   Do you have difficulty concentrating, remembering or making decisions? No       Compared to one year ago, the patient feels her physical health is the same and her mental health is the same.    Depression Screen:  PHQ-2/PHQ-9 Depression Screening 11/24/2020   Little interest or pleasure in doing things 0   Feeling down, depressed, or hopeless 0   Trouble falling or staying asleep, or sleeping too much -   Feeling tired or having little energy -   Poor appetite or overeating -   Feeling bad about yourself - or that you are a failure or have let yourself or your family down -   Trouble concentrating on things, such as reading the newspaper or watching television -   Moving or speaking so slowly that other people could have noticed. Or the opposite - being so fidgety or restless that you have been moving around a lot more than usual -   Thoughts that you would be better off dead, or of hurting yourself in some way -   Total Score 0   If you checked off any problems, how difficult have these problems made it for you to do your work, take care of things at home, or get along with other people? -         Past Medical/Family/Social History:  The following portions of the patient's history were reviewed and updated as appropriate: allergies, current medications, past family history, past medical history, past social history, past surgical history and problem list.    Allergies   Allergen Reactions   • Beta Adrenergic Blockers Other (See Comments)     Couldn't breathe or move         Current Outpatient Medications:   •  Cartia  MG 24 hr capsule, Take 1 capsule by mouth Daily., Disp: 90 capsule, Rfl: 1  •  lisinopril-hydrochlorothiazide (PRINZIDE,ZESTORETIC) 20-12.5 MG per tablet, Take 1 tablet by mouth Daily. Last dose 7/27 by 0800, Disp: , Rfl:   •  Multiple Vitamins-Minerals (MULTIVITAMIN ADULTS PO),  Take 1 tablet by mouth Daily., Disp: , Rfl:   •  Omega-3 Fatty Acids (FISH OIL) 1000 MG capsule capsule, Take 1,000 mg by mouth Daily With Breakfast., Disp: , Rfl:   •  temazepam (RESTORIL) 15 MG capsule, Take 1 capsule by mouth At Night As Needed for Sleep., Disp: 30 capsule, Rfl: 1  •  Unable to find, CPAP MACHINE, Disp: , Rfl:     Aspirin use counseling: Does not need ASA (and currently is not on it)    Current medication list contains no high risk medications.  No harmful drug interactions have been identified.     Family History   Problem Relation Age of Onset   • Cancer Mother         Don't know where cancer began.   • Kidney disease Mother    • Thyroid disease Sister    • Allergies Sister    • Allergies Brother    • Cancer Daughter         Ewings Sarcoma   • Other Daughter         EWINGS SARCOMA   • Cancer Other    • Alcohol abuse Maternal Grandfather    • Arthritis Maternal Grandmother    • Cancer Maternal Aunt         Breast cancer       Social History     Tobacco Use   • Smoking status: Former Smoker     Packs/day: 1.00     Years: 30.00     Pack years: 30.00     Types: Cigarettes     Start date: 1976     Quit date: 2006     Years since quittin.4   • Smokeless tobacco: Never Used   Substance Use Topics   • Alcohol use: No     Frequency: Never       Past Surgical History:   Procedure Laterality Date   • BACK SURGERY  /    Spondylolisthesis   • BREAST BIOPSY     • CARDIAC CATHETERIZATION  2018    NL DR ONEAL   • CHOLECYSTECTOMY     • COLONOSCOPY      Clean colon found.   • EYE SURGERY Bilateral     Appenbrmorenita, laser   • HIP ARTHROPLASTY Right 2010    DR PACKER   • HYSTERECTOMY  1984    CARCINOMA IN SITU   • JOINT REPLACEMENT      First knee replacement.   • LAMINECTOMY      During first back surgery.   • LUMBAR DISCECTOMY      DR VOGEL   • LUMBAR FUSION      L3/5 DR PAPPAS   • LUMBAR LAMINECTOMY      LUMBAR SPINAL FUSION L4/L5   JANIS   • OOPHORECTOMY Right 1996   • PAROTIDECTOMY Right 2004   • PARTIAL HIP ARTHROPLASTY  03/16/2017    Dr Michel   • REVISION TOTAL KNEE ARTHROPLASTY Left 03/16/2017    DR MICHEL   • ROTATOR CUFF REPAIR Right 2003   • SHOULDER SURGERY  2003    Right rotator cuff.   • SPINAL FUSION  1989    Spondylolisthesis   • SPINE SURGERY  1989    Spondylolisthesis   • THORACOSCOPY Right 7/29/2020    Procedure: RIGHT VIDEO ASSISTED THORACOSCOPY WITH RIGHT MIDDLE LOBE THERAPUTIC WEDGE RESECTION AND RIGHT MIDDLE LOBECTOMY; HILAR LYMPH NODE DISSECTION;  Surgeon: Jaden Fagan MD;  Location[facility] MAIN OR;  Service: Cardiothoracic;  Laterality: Right;   • THYROID BIOPSY  01/2018    X5   • THYROID SURGERY      nodules removed   • TOTAL KNEE ARTHROPLASTY  2009    DR MICHEL   • TUBAL ABDOMINAL LIGATION Bilateral 1976       Patient Active Problem List   Diagnosis   • Inflammatory polyarthropathy (CMS/HCC)   • Essential hypertension   • Status post hip replacement   • Thyroid nodule   • Degeneration of intervertebral disc   • Depression   • Fatty liver   • Insomnia   • Low back pain   • Lumbosacral radiculopathy   • Spinal stenosis of lumbar region   • Obesity (BMI 30-39.9)   • Postmenopausal status   • Tobacco dependence in remission   • Encounter for screening mammogram for malignant neoplasm of breast    • Dry eye syndrome, bilateral   • Fuchs' corneal dystrophy   • Lens replaced by other means   • Ocular hypertension, bilateral   • Open angle with borderline findings, low risk, bilateral   • Pseudophakia of both eyes   • PVD (posterior vitreous detachment), both eyes   • Vitreous degeneration   • Right middle lobe pulmonary nodule   • Sleep apnea   • Primary cancer of right middle lobe of lung (CMS/HCC)   • Medicare annual wellness visit, subsequent       Review of Systems   Constitutional: Negative for chills and fever.   HENT: Negative for sinus pressure, sore throat and swollen glands.    Eyes: Negative for blurred  "vision.   Respiratory: Negative for cough, shortness of breath and wheezing.    Cardiovascular: Negative for chest pain and palpitations.   Gastrointestinal: Negative for abdominal pain.   Endocrine: Negative for polyuria.   Genitourinary: Negative for difficulty urinating.   Skin: Negative for rash.   Neurological: Negative for dizziness, seizures and headache.   Hematological: Negative for adenopathy.   Psychiatric/Behavioral: Negative for depressed mood.       Objective     Vitals:    11/24/20 1306   BP: (!) 186/80   BP Location: Left arm   Patient Position: Sitting   Cuff Size: Adult   Pulse: 87   Temp: 97.3 °F (36.3 °C)   SpO2: 97%   Weight: 89.8 kg (198 lb)   Height: 163.8 cm (64.5\")       Patient's Body mass index is 33.46 kg/m². BMI is above normal parameters. Recommendations include: exercise counseling.      No exam data present    The patient has no evidence of cognitve impairment.     Physical Exam  Constitutional:       General: She is not in acute distress.     Appearance: She is well-developed.   HENT:      Head: Normocephalic.   Eyes:      General: Lids are normal.      Conjunctiva/sclera: Conjunctivae normal.   Neck:      Musculoskeletal: Normal range of motion.      Thyroid: No thyroid mass or thyromegaly.      Trachea: Trachea normal.   Cardiovascular:      Rate and Rhythm: Normal rate and regular rhythm.      Heart sounds: Normal heart sounds.   Pulmonary:      Effort: Pulmonary effort is normal.      Breath sounds: Normal breath sounds.   Abdominal:      Palpations: Abdomen is soft.   Lymphadenopathy:      Cervical: No cervical adenopathy.   Skin:     General: Skin is warm and dry.   Neurological:      Mental Status: She is alert and oriented to person, place, and time.   Psychiatric:         Attention and Perception: She is attentive.         Mood and Affect: Mood normal.         Speech: Speech normal.         Behavior: Behavior normal.         Recent Lab Results:     Lab Results   Component " Value Date    CHOL 190 11/21/2019    TRIG 97 11/21/2019    HDL 59 11/21/2019    VLDL 19.4 11/21/2019    LDLHDL 1.89 11/21/2019     No visits with results within 1 Week(s) from this visit.   Latest known visit with results is:   Admission on 07/29/2020, Discharged on 07/31/2020   Component Date Value Ref Range Status   • Product Code 07/29/2020 D4885S56   Final   • Unit Number 07/29/2020 [id number]   Final   • UNIT  ABO 07/29/2020 A   Final   • UNIT  RH 07/29/2020 POS   Final   • Crossmatch Interpretation 07/29/2020 Compatible   Final   • Dispense Status 07/29/2020 RE   Final   • Blood Expiration Date 07/29/2020 202009032359   Final   • Blood Type Barcode 07/29/2020 6200   Final   • Product Code 07/29/2020 A7077H14   Final   • Unit Number 07/29/2020 [id number]   Final   • UNIT  ABO 07/29/2020 A   Final   • UNIT  RH 07/29/2020 POS   Final   • Crossmatch Interpretation 07/29/2020 Compatible   Final   • Dispense Status 07/29/2020 RE   Final   • Blood Expiration Date 07/29/2020 202009022359   Final   • Blood Type Barcode 07/29/2020 6200   Final   • Case Report 07/29/2020    Final                    Value:Surgical Pathology Report                         Case: [id number]                                  Authorizing Provider:  Jaden Fagan MD      Collected:           07/29/2020 08:22 AM          Ordering Location:     [facility] MAIN  Received:            07/29/2020 08:29 AM                                 OR                                                                           Pathologist:           Zac Justice MD                                                            Specimens:   1) - Lung, Right Middle Lobe, RIGHT MIDDLE LOBE PULMONARY NODULE WEDGE RESECTION                    2) - Lung, Right Middle Lobe, RIGHT MIDDLE LOBECTOMY                                      • Clinical Information 07/29/2020    Final                    Value:This result contains rich text formatting which  cannot be displayed here.   • Final Diagnosis 07/29/2020    Final                    Value:This result contains rich text formatting which cannot be displayed here.   • Synoptic Checklist 07/29/2020    Final                    Value:LUNG  (Lung - All Specimens)                                                        8th Edition - Protocol posted: 2/26/2020                                                        SPECIMEN                               Procedure:    Wedge resection                                Procedure:    Completion lobectomy                                Specimen Laterality:    Right                                                         TUMOR                               Tumor Site:    Middle lobe of lung                                Histologic Type:    Invasive adenocarcinoma, acinar predominant                                Histologic Grade:    G2: Moderately differentiated                                Spread Through Air Spaces (EMILEE):    Not identified                                Tumor Size:                                     Total Tumor Size (size of entire tumor):    Greatest Dimension (Centimeters): 1.2 cm                               Tumor Focality:    Single focus                                Visceral Pleura Invasion:    Not identified                                Direct Invasion of Adjacent Structures:    No adjacent structures present                                Treatment Effect:    No known presurgical therapy                                Lymphovascular Invasion:    Not identified                                                         MARGINS                               Margins:    All margins are uninvolved by tumor                                  Margins Examined:    Bronchial                                  Margins Examined:    Vascular                                  Margins Examined:    Parenchymal                                  Distance of Invasive  Carcinoma from Closest Margin (Centimeters):    1 cm                                   Closest Margin:    Parenchymal                                                         LYMPH NODES                               Number of Lymph Nodes Involved:    0                                Number of Lymph Nodes Examined:    3                                  Cesario Stations Examined:    10R: Hilar                                                         PATHOLOGIC STAGE CLASSIFICATION (pTNM, AJCC 8th Edition)                               Primary Tumor (pT):    pT1b                                Regional Lymph Nodes (pN):    pN0                                                         ADDITIONAL FINDINGS                               Additional Findings:    None identified      • Intraoperative Consultation 07/29/2020    Final                    Value:This result contains rich text formatting which cannot be displayed here.   • Gross Description 07/29/2020    Final                    Value:This result contains rich text formatting which cannot be displayed here.   • Glucose 07/30/2020 123* 65 - 99 mg/dL Final   • BUN 07/30/2020    Final    Testing performed by alternate method   • Creatinine 07/30/2020 0.75  0.57 - 1.00 mg/dL Final   • Sodium 07/30/2020 136  136 - 145 mmol/L Final   • Potassium 07/30/2020 3.7  3.5 - 5.2 mmol/L Final   • Chloride 07/30/2020 99  98 - 107 mmol/L Final   • CO2 07/30/2020 24.0  22.0 - 29.0 mmol/L Final   • Calcium 07/30/2020 9.2  8.6 - 10.5 mg/dL Final   • eGFR Non African Amer 07/30/2020 75  >60 mL/min/1.73 Final   • BUN/Creatinine Ratio 07/30/2020    Final    Testing not performed   • Anion Gap 07/30/2020 13.0  5.0 - 15.0 mmol/L Final   • Magnesium 07/30/2020 1.9  1.6 - 2.4 mg/dL Final   • Phosphorus 07/30/2020 3.0  2.5 - 4.5 mg/dL Final   • WBC 07/30/2020 12.20* 3.40 - 10.80 10*3/mm3 Final   • RBC 07/30/2020 3.90  3.77 - 5.28 10*6/mm3 Final   • Hemoglobin 07/30/2020 12.3  12.0 - 15.9 g/dL  Final   • Hematocrit 07/30/2020 37.1  34.0 - 46.6 % Final   • MCV 07/30/2020 95.1  79.0 - 97.0 fL Final   • MCH 07/30/2020 31.6  26.6 - 33.0 pg Final   • MCHC 07/30/2020 33.3  31.5 - 35.7 g/dL Final   • RDW 07/30/2020 13.1  12.3 - 15.4 % Final   • RDW-SD 07/30/2020 44.2  37.0 - 54.0 fl Final   • MPV 07/30/2020 7.5  6.0 - 12.0 fL Final   • Platelets 07/30/2020 215  140 - 450 10*3/mm3 Final   • Neutrophil % 07/30/2020 79.8* 42.7 - 76.0 % Final   • Lymphocyte % 07/30/2020 9.2* 19.6 - 45.3 % Final   • Monocyte % 07/30/2020 10.6  5.0 - 12.0 % Final   • Eosinophil % 07/30/2020 0.0* 0.3 - 6.2 % Final   • Basophil % 07/30/2020 0.4  0.0 - 1.5 % Final   • Neutrophils, Absolute 07/30/2020 9.80* 1.70 - 7.00 10*3/mm3 Final   • Lymphocytes, Absolute 07/30/2020 1.10  0.70 - 3.10 10*3/mm3 Final   • Monocytes, Absolute 07/30/2020 1.30* 0.10 - 0.90 10*3/mm3 Final   • Eosinophils, Absolute 07/30/2020 0.00  0.00 - 0.40 10*3/mm3 Final   • Basophils, Absolute 07/30/2020 0.00  0.00 - 0.20 10*3/mm3 Final   • nRBC 07/30/2020 0.0  0.0 - 0.2 /100 WBC Final   • BUN 07/30/2020 9  8 - 23 mg/dL Final   • Glucose 07/31/2020 106* 65 - 99 mg/dL Final   • BUN 07/31/2020    Final    Testing performed by alternate method   • Creatinine 07/31/2020 0.84  0.57 - 1.00 mg/dL Final   • Sodium 07/31/2020 144  136 - 145 mmol/L Final   • Potassium 07/31/2020 3.8  3.5 - 5.2 mmol/L Final    Specimen hemolyzed.  Results may be affected.   • Chloride 07/31/2020 102  98 - 107 mmol/L Final   • CO2 07/31/2020 29.0  22.0 - 29.0 mmol/L Final   • Calcium 07/31/2020 9.4  8.6 - 10.5 mg/dL Final   • eGFR Non African Amer 07/31/2020 65  >60 mL/min/1.73 Final   • BUN/Creatinine Ratio 07/31/2020    Final    Testing not performed   • Anion Gap 07/31/2020 13.0  5.0 - 15.0 mmol/L Final   • Magnesium 07/31/2020 2.3  1.6 - 2.4 mg/dL Final   • Phosphorus 07/31/2020 3.7  2.5 - 4.5 mg/dL Final   • WBC 07/31/2020 10.60  3.40 - 10.80 10*3/mm3 Final   • RBC 07/31/2020 4.02  3.77 - 5.28  10*6/mm3 Final   • Hemoglobin 07/31/2020 12.9  12.0 - 15.9 g/dL Final   • Hematocrit 07/31/2020 38.1  34.0 - 46.6 % Final   • MCV 07/31/2020 94.7  79.0 - 97.0 fL Final   • MCH 07/31/2020 32.0  26.6 - 33.0 pg Final   • MCHC 07/31/2020 33.8  31.5 - 35.7 g/dL Final   • RDW 07/31/2020 13.1  12.3 - 15.4 % Final   • RDW-SD 07/31/2020 43.3  37.0 - 54.0 fl Final   • MPV 07/31/2020 7.5  6.0 - 12.0 fL Final   • Platelets 07/31/2020 263  140 - 450 10*3/mm3 Final   • Neutrophil % 07/31/2020 64.7  42.7 - 76.0 % Final   • Lymphocyte % 07/31/2020 24.0  19.6 - 45.3 % Final   • Monocyte % 07/31/2020 10.0  5.0 - 12.0 % Final   • Eosinophil % 07/31/2020 0.7  0.3 - 6.2 % Final   • Basophil % 07/31/2020 0.6  0.0 - 1.5 % Final   • Neutrophils, Absolute 07/31/2020 6.80  1.70 - 7.00 10*3/mm3 Final   • Lymphocytes, Absolute 07/31/2020 2.50  0.70 - 3.10 10*3/mm3 Final   • Monocytes, Absolute 07/31/2020 1.10* 0.10 - 0.90 10*3/mm3 Final   • Eosinophils, Absolute 07/31/2020 0.10  0.00 - 0.40 10*3/mm3 Final   • Basophils, Absolute 07/31/2020 0.10  0.00 - 0.20 10*3/mm3 Final   • nRBC 07/31/2020 0.1  0.0 - 0.2 /100 WBC Final   • BUN 07/31/2020 19  8 - 23 mg/dL Final         Assessment/Plan   Age-appropriate Screening Schedule:  Refer to the list below for future screening recommendations based on patient's age, sex and/or medical conditions.      Health Maintenance   Topic Date Due   • TDAP/TD VACCINES (1 - Tdap) 06/30/1959   • LIPID PANEL  11/21/2020   • ZOSTER VACCINE (2 of 2) 12/03/2020   • INFLUENZA VACCINE  Completed       Medicare Risks and Personalized Health Plan:  Advance Directive Discussion  Breast Cancer/Mammogram Screening  Diabetic Lab Screening       CMS-Preventive Services Quick Reference  Medicare Preventive Services Addressed:  Annual Wellness Visit (AWV)  Diabetes Screening-Lab Order for either glucose quantitative blood (except reagent strip), glucose;post glucose dose(includes glucose), or glucose tolerance test-3  specimens(includes glucose)  Screening Mammography     Advance Care Planning:  ACP discussion was held with the patient during this visit. Patient has an advance directive in EMR which is still valid.     Diagnoses and all orders for this visit:    1. Medicare annual wellness visit, subsequent (Primary)    2. Encounter for screening mammogram for breast cancer  -     Mammo Screening Digital Tomosynthesis Bilateral With CAD; Future    3. Lumbosacral radiculopathy  -     CT Lumbar Spine Without Contrast; Future    4. Hyperlipidemia, unspecified hyperlipidemia type  -     Lipid Panel        An After Visit Summary and PPPS with all of these plans were given to the patient.      Follow Up:  Return in about 1 year (around 11/24/2021) for Medicare Wellness.

## 2020-12-09 ENCOUNTER — HOSPITAL ENCOUNTER (OUTPATIENT)
Dept: PET IMAGING | Facility: HOSPITAL | Age: 80
Discharge: HOME | End: 2020-12-09
Admitting: THORACIC SURGERY (CARDIOTHORACIC VASCULAR SURGERY)

## 2020-12-09 DIAGNOSIS — R91.1 RIGHT MIDDLE LOBE PULMONARY NODULE: ICD-10-CM

## 2020-12-09 LAB — CREAT BLDA-MCNC: 0.8 MG/DL (ref 0.6–1.3)

## 2020-12-09 PROCEDURE — 0 IOPAMIDOL PER 1 ML: Performed by: THORACIC SURGERY (CARDIOTHORACIC VASCULAR SURGERY)

## 2020-12-09 PROCEDURE — 82565 ASSAY OF CREATININE: CPT

## 2020-12-09 PROCEDURE — 71260 CT THORAX DX C+: CPT

## 2020-12-09 RX ORDER — LISINOPRIL AND HYDROCHLOROTHIAZIDE 20; 12.5 MG/1; MG/1
1 TABLET ORAL DAILY
Qty: 90 TABLET | Refills: 1 | Status: SHIPPED | OUTPATIENT
Start: 2020-12-09 | End: 2021-03-01 | Stop reason: SDUPTHER

## 2020-12-09 RX ADMIN — IOPAMIDOL 100 ML: 755 INJECTION, SOLUTION INTRAVENOUS at 10:55

## 2020-12-09 NOTE — TELEPHONE ENCOUNTER
----- Message from Leann Calix sent at 12/9/2020  8:14 AM EST -----  Regarding: Prescription Question  Contact: [phone]  I need a one time 90 day prescription for Lisinopril/HCTZ 20/12.5  called into [facility] in [place], [place].   My drug plan changes after 1-1-2021.  Leann Calix [birth year].

## 2020-12-10 ENCOUNTER — OFFICE VISIT (OUTPATIENT)
Dept: SURGERY | Facility: CLINIC | Age: 80
End: 2020-12-10

## 2020-12-10 VITALS
OXYGEN SATURATION: 94 % | DIASTOLIC BLOOD PRESSURE: 78 MMHG | BODY MASS INDEX: 33.66 KG/M2 | SYSTOLIC BLOOD PRESSURE: 158 MMHG | TEMPERATURE: 97.7 F | WEIGHT: 202 LBS | HEIGHT: 65 IN | HEART RATE: 89 BPM

## 2020-12-10 DIAGNOSIS — R91.1 RIGHT MIDDLE LOBE PULMONARY NODULE: Primary | ICD-10-CM

## 2020-12-10 DIAGNOSIS — R91.1 RIGHT UPPER LOBE PULMONARY NODULE: ICD-10-CM

## 2020-12-10 DIAGNOSIS — C34.2 PRIMARY CANCER OF RIGHT MIDDLE LOBE OF LUNG (HCC): ICD-10-CM

## 2020-12-10 PROCEDURE — 99214 OFFICE O/P EST MOD 30 MIN: CPT | Performed by: THORACIC SURGERY (CARDIOTHORACIC VASCULAR SURGERY)

## 2020-12-10 NOTE — PROGRESS NOTES
Thoracic surgery progress note    Chief complaint follow-up right middle lobectomy follow-up right upper lobe pulmonary nodule    The patient is doing well following right middle lobectomy earlier this year.  This is her first interval follow-up.  She presents with a repeat CT scan that I personally examined.  There is no evidence of recurrent cancer there is a subcentimeter right upper lobe pulmonary nodule that is unchanged in size that needs continued following.  There is no other evidence of recurrent cancer.  No adenopathy no new pulmonary nodules.  I personally examined scan reviewed the radiology report and discussed it with the patient.    The patient is asymptomatic no fevers chills night sweats cough hemoptysis or sputum production.  She completed and all system review of systems form and is otherwise negative.    She does note persistent back pain has had previous surgery.  She is seeking further neurosurgical or spine intervention.    On physical examination she is well-appearing in no distress.  COVID-19 precautions practice.  No central or peripheral cyanosis symmetrical chest expansion.    Impression #1 lung cancer right middle lobe status post resection no evidence of recurrence #2 right upper lobe pulmonary nodule stable in size plan repeat CT scan in 4 months  Staying smoke-free encouraged to remain so has quit for over 20 years.

## 2020-12-11 ENCOUNTER — HOSPITAL ENCOUNTER (OUTPATIENT)
Dept: CT IMAGING | Facility: HOSPITAL | Age: 80
Discharge: HOME | End: 2020-12-11
Admitting: FAMILY MEDICINE

## 2020-12-11 DIAGNOSIS — M54.17 LUMBOSACRAL RADICULOPATHY: ICD-10-CM

## 2020-12-11 PROCEDURE — 72131 CT LUMBAR SPINE W/O DYE: CPT

## 2020-12-14 ENCOUNTER — TELEPHONE (OUTPATIENT)
Dept: SURGERY | Facility: CLINIC | Age: 80
End: 2020-12-14

## 2020-12-14 NOTE — TELEPHONE ENCOUNTER
Spoke to patient, she didn't have any questions.   ----- Message from Leann Claix sent at 12/12/2020  9:48 AM EST -----  Regarding: Non-Urgent Medical Question  Contact: [phone]  I have a question about POCT CREATININE resulted on 12/9/20 at 10:59 AM.  I just realized how that came about. It happened before my ct at the cancer center to check my kidney function. Sorry to bother you.

## 2020-12-18 DIAGNOSIS — F51.01 PRIMARY INSOMNIA: ICD-10-CM

## 2020-12-18 RX ORDER — TEMAZEPAM 15 MG/1
15 CAPSULE ORAL NIGHTLY PRN
Qty: 30 CAPSULE | Refills: 1 | Status: SHIPPED | OUTPATIENT
Start: 2020-12-18 | End: 2021-01-22 | Stop reason: SDUPTHER

## 2021-01-21 ENCOUNTER — PATIENT MESSAGE (OUTPATIENT)
Dept: FAMILY MEDICINE CLINIC | Facility: CLINIC | Age: 81
End: 2021-01-21

## 2021-01-21 DIAGNOSIS — F51.01 PRIMARY INSOMNIA: ICD-10-CM

## 2021-01-22 RX ORDER — TEMAZEPAM 15 MG/1
15 CAPSULE ORAL NIGHTLY PRN
Qty: 30 CAPSULE | Refills: 1 | Status: SHIPPED | OUTPATIENT
Start: 2021-01-22 | End: 2021-03-19 | Stop reason: SDUPTHER

## 2021-03-01 RX ORDER — LISINOPRIL AND HYDROCHLOROTHIAZIDE 20; 12.5 MG/1; MG/1
1 TABLET ORAL DAILY
Qty: 90 TABLET | Refills: 1 | Status: SHIPPED | OUTPATIENT
Start: 2021-03-01 | End: 2021-05-12

## 2021-03-01 RX ORDER — DILTIAZEM HYDROCHLORIDE 180 MG/1
180 CAPSULE, EXTENDED RELEASE ORAL DAILY
Qty: 90 CAPSULE | Refills: 1 | Status: SHIPPED | OUTPATIENT
Start: 2021-03-01 | End: 2021-05-12

## 2021-03-18 ENCOUNTER — PATIENT MESSAGE (OUTPATIENT)
Dept: FAMILY MEDICINE CLINIC | Facility: CLINIC | Age: 81
End: 2021-03-18

## 2021-03-18 DIAGNOSIS — F51.01 PRIMARY INSOMNIA: Primary | ICD-10-CM

## 2021-03-19 DIAGNOSIS — F51.01 PRIMARY INSOMNIA: ICD-10-CM

## 2021-03-19 RX ORDER — TEMAZEPAM 15 MG/1
15 CAPSULE ORAL NIGHTLY PRN
Qty: 30 CAPSULE | Refills: 1 | Status: SHIPPED | OUTPATIENT
Start: 2021-03-19 | End: 2021-03-19

## 2021-03-19 RX ORDER — TEMAZEPAM 30 MG/1
30 CAPSULE ORAL NIGHTLY PRN
Qty: 30 CAPSULE | Refills: 1 | Status: SHIPPED | OUTPATIENT
Start: 2021-03-19 | End: 2021-05-11 | Stop reason: SDUPTHER

## 2021-04-07 ENCOUNTER — HOSPITAL ENCOUNTER (OUTPATIENT)
Dept: PET IMAGING | Facility: HOSPITAL | Age: 81
Discharge: HOME | End: 2021-04-07
Admitting: THORACIC SURGERY (CARDIOTHORACIC VASCULAR SURGERY)

## 2021-04-07 DIAGNOSIS — R91.1 RIGHT MIDDLE LOBE PULMONARY NODULE: ICD-10-CM

## 2021-04-07 LAB — CREAT BLDA-MCNC: 0.8 MG/DL (ref 0.6–1.3)

## 2021-04-07 PROCEDURE — 71260 CT THORAX DX C+: CPT

## 2021-04-07 PROCEDURE — 0 IOPAMIDOL PER 1 ML: Performed by: THORACIC SURGERY (CARDIOTHORACIC VASCULAR SURGERY)

## 2021-04-07 PROCEDURE — 82565 ASSAY OF CREATININE: CPT

## 2021-04-07 RX ADMIN — IOPAMIDOL 100 ML: 755 INJECTION, SOLUTION INTRAVENOUS at 11:10

## 2021-04-19 ENCOUNTER — OFFICE VISIT (OUTPATIENT)
Dept: FAMILY MEDICINE CLINIC | Facility: CLINIC | Age: 81
End: 2021-04-19

## 2021-04-19 ENCOUNTER — PATIENT MESSAGE (OUTPATIENT)
Dept: FAMILY MEDICINE CLINIC | Facility: CLINIC | Age: 81
End: 2021-04-19

## 2021-04-19 VITALS
OXYGEN SATURATION: 94 % | HEART RATE: 78 BPM | SYSTOLIC BLOOD PRESSURE: 142 MMHG | BODY MASS INDEX: 37.01 KG/M2 | DIASTOLIC BLOOD PRESSURE: 84 MMHG | TEMPERATURE: 97.3 F | WEIGHT: 219 LBS

## 2021-04-19 DIAGNOSIS — M67.471 GANGLION CYST OF RIGHT FOOT: Primary | ICD-10-CM

## 2021-04-19 DIAGNOSIS — N39.41 URGE INCONTINENCE: ICD-10-CM

## 2021-04-19 DIAGNOSIS — I10 ESSENTIAL HYPERTENSION: Chronic | ICD-10-CM

## 2021-04-19 DIAGNOSIS — N39.41 URGE INCONTINENCE: Primary | ICD-10-CM

## 2021-04-19 PROCEDURE — 99214 OFFICE O/P EST MOD 30 MIN: CPT | Performed by: FAMILY MEDICINE

## 2021-04-19 NOTE — PROGRESS NOTES
Answers for HPI/ROS submitted by the patient on 4/16/2021  Please describe your symptoms.: Possible urinary, bladder or rectal prolapse causing incontinence plus groin pain on my left side & back locking up. , Possible fatty tumor on top of right foot not associated with other problem.  Have you had these symptoms before?: Yes  How long have you been having these symptoms?: Greater than 2 weeks  Please list any medications you are currently taking for this condition.: None  Please describe any probable cause for these symptoms. : That's what I'm trying to find out.  What is the primary reason for your visit?: Other    Chief Complaint  Bladder Problem (see questionnaire in lower part of chart) and Foot Problem (rt )    Subjective          Leann Calix presents to [facility]  She reports some urinary urgency and not having much warning before she needs to urinate.    Cystic mass on top of right foot.    Foot Problem  This is a new problem. The current episode started more than 1 month ago. The problem occurs constantly. The problem has been unchanged. Associated symptoms include urinary symptoms. Pertinent negatives include no chest pain, chills, congestion, coughing, fever, rash or swollen glands. Nothing aggravates the symptoms. She has tried nothing for the symptoms.   Difficulty Urinating  This is a new problem. The current episode started more than 1 year ago. The problem occurs daily. The problem has been unchanged. Associated symptoms include urinary symptoms. Pertinent negatives include no chest pain, chills, congestion, coughing, fever, rash or swollen glands. She has tried nothing for the symptoms.   Hypertension  This is a chronic problem. The current episode started more than 1 year ago. The problem has been gradually improving since onset. Pertinent negatives include no chest pain, palpitations, peripheral edema or shortness of breath. Current antihypertension treatment  includes calcium channel blockers, diuretics and ACE inhibitors. The current treatment provides moderate improvement. There are no compliance problems.        Objective   Vital Signs:   /84 (BP Location: Left arm, Patient Position: Sitting, Cuff Size: Adult)   Pulse 78   Temp 97.3 °F (36.3 °C)   Wt 99.3 kg (219 lb)   SpO2 94%   BMI 37.01 kg/m²     Physical Exam  Vitals reviewed.   Constitutional:       General: She is not in acute distress.     Appearance: She is well-developed.   HENT:      Head: Normocephalic.   Eyes:      General: Lids are normal.      Conjunctiva/sclera: Conjunctivae normal.   Neck:      Thyroid: No thyroid mass or thyromegaly.      Trachea: Trachea normal.   Cardiovascular:      Rate and Rhythm: Normal rate and regular rhythm.      Heart sounds: Normal heart sounds.   Pulmonary:      Effort: Pulmonary effort is normal.      Breath sounds: Normal breath sounds.   Musculoskeletal:      Cervical back: Normal range of motion.        Legs:    Lymphadenopathy:      Cervical: No cervical adenopathy.   Skin:     General: Skin is warm and dry.   Neurological:      Mental Status: She is alert and oriented to person, place, and time.   Psychiatric:         Attention and Perception: She is attentive.         Mood and Affect: Mood normal.         Speech: Speech normal.         Behavior: Behavior normal.        Result Review :   The following data was reviewed by: Ryann Simon MD on 04/19/2021:  CMP    CMP 7/31/20 7/31/20 12/9/20 4/7/21    0253 0253     Glucose 106 (A)      BUN  19     Creatinine 0.84  0.80 0.80   eGFR Non African Am 65      Sodium 144      Potassium 3.8      Chloride 102      Calcium 9.4      (A) Abnormal value       Comments are available for some flowsheets but are not being displayed.           CBC    CBC 7/24/20 7/30/20 7/31/20   WBC 6.70 12.20 (A) 10.60   RBC 4.53 3.90 4.02   Hemoglobin 14.6 12.3 12.9   Hematocrit 44.0 37.1 38.1   MCV 97.1 (A) 95.1 94.7   MCH 32.2 31.6  32.0   MCHC 33.2 33.3 33.8   RDW 13.3 13.1 13.1   Platelets 263 215 263   (A) Abnormal value                              Assessment and Plan    Diagnoses and all orders for this visit:    1. Ganglion cyst of right foot (Primary)  Assessment & Plan:  Offered referral to podiatry.  Patient declines for now but will call back if she decides she wants to pursue.      2. Urge incontinence  Assessment & Plan:  Refer to Urology for further evaluation.      3. Essential hypertension  Assessment & Plan:  Hypertension is improving with treatment.  Continue current treatment regimen.  Blood pressure will be reassessed at the next regular appointment.        Follow Up   Return if symptoms worsen or fail to improve.  Patient was given instructions and counseling regarding her condition or for health maintenance advice. Please see specific information pulled into the AVS if appropriate.

## 2021-04-20 NOTE — ASSESSMENT & PLAN NOTE
Offered referral to podiatry.  Patient declines for now but will call back if she decides she wants to pursue.

## 2021-04-22 ENCOUNTER — OFFICE VISIT (OUTPATIENT)
Dept: SURGERY | Facility: CLINIC | Age: 81
End: 2021-04-22

## 2021-04-22 VITALS
OXYGEN SATURATION: 94 % | HEIGHT: 65 IN | HEART RATE: 80 BPM | SYSTOLIC BLOOD PRESSURE: 132 MMHG | TEMPERATURE: 97.7 F | BODY MASS INDEX: 36.99 KG/M2 | WEIGHT: 222 LBS | DIASTOLIC BLOOD PRESSURE: 73 MMHG

## 2021-04-22 DIAGNOSIS — C34.2 PRIMARY CANCER OF RIGHT MIDDLE LOBE OF LUNG (HCC): Primary | ICD-10-CM

## 2021-04-22 PROCEDURE — 99213 OFFICE O/P EST LOW 20 MIN: CPT | Performed by: THORACIC SURGERY (CARDIOTHORACIC VASCULAR SURGERY)

## 2021-04-22 NOTE — PROGRESS NOTES
"Chief Complaint  Lung Cancer (4 month follow up CT chest )    Subjective          Leann E Fifi presents to [facility] in follow-up for non-small cell lung cancer.  History of Present Illness  Ms. ambriz is a pleasant 80-year-old lady status post right middle lobe resection in July 2020 for a T1BN0 non-small cell lung cancer.  She recovered well from that surgical procedure and presents today with a surveillance CT scan.  She is doing very well and has no new complaints except for some low back pain.  Objective   Vital Signs:   /73 (BP Location: Left arm, Patient Position: Sitting, Cuff Size: Adult)   Pulse 80   Temp 97.7 °F (36.5 °C) (Temporal)   Ht 163.8 cm (64.5\")   Wt 101 kg (222 lb)   SpO2 94%   BMI 37.52 kg/m²     Physical Exam  Constitutional:       Appearance: Normal appearance.   Eyes:      Extraocular Movements: Extraocular movements intact.   Pulmonary:      Effort: Pulmonary effort is normal.   Musculoskeletal:      Cervical back: Normal range of motion.   Neurological:      Mental Status: She is alert.   Psychiatric:         Mood and Affect: Mood normal.         Thought Content: Thought content normal.        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  2 mm noncalcified nodules bilaterally.  Scarring consistent with a right middle lobectomy.  No mediastinal or hilar lymphadenopathy.  No pleural pericardial effusion.     Assessment and Plan      Ms. ambriz is a very pleasant 80-year-old lady status post right middle lobectomy in July 2020 for a stage I, T1b N0 non-small cell lung cancer.  She is doing very well.  She does not have any evidence of recurrence on her CT scan performed today.  She will need continued lung cancer surveillance with a CT of the " chest in 4 months and a follow-up visit at that time.  Diagnoses and all orders for this visit:    1. Primary cancer of right middle lobe of lung (CMS/HCC) (Primary)  -     CT Chest Without Contrast; Future      I spent 23 minutes caring for June on this date of service. This time includes time spent by me in the following activities:preparing for the visit, reviewing tests, obtaining and/or reviewing a separately obtained history, performing a medically appropriate examination and/or evaluation , counseling and educating the patient/family/caregiver, ordering medications, tests, or procedures, referring and communicating with other health care professionals  and documenting information in the medical record  Follow Up   No follow-ups on file.  Patient was given instructions and counseling regarding her condition or for health maintenance advice. Please see specific information pulled into the AVS if appropriate.

## 2021-05-11 DIAGNOSIS — F51.01 PRIMARY INSOMNIA: ICD-10-CM

## 2021-05-11 RX ORDER — TEMAZEPAM 30 MG/1
30 CAPSULE ORAL NIGHTLY PRN
Qty: 30 CAPSULE | Refills: 1 | Status: SHIPPED | OUTPATIENT
Start: 2021-05-11 | End: 2021-07-14 | Stop reason: SDUPTHER

## 2021-05-12 RX ORDER — DILTIAZEM HYDROCHLORIDE 180 MG/1
CAPSULE, COATED, EXTENDED RELEASE ORAL
Qty: 90 CAPSULE | Refills: 1 | Status: SHIPPED | OUTPATIENT
Start: 2021-05-12 | End: 2021-09-30 | Stop reason: SDUPTHER

## 2021-05-12 RX ORDER — LISINOPRIL AND HYDROCHLOROTHIAZIDE 20; 12.5 MG/1; MG/1
TABLET ORAL
Qty: 90 TABLET | Refills: 1 | Status: SHIPPED | OUTPATIENT
Start: 2021-05-12 | End: 2021-10-11 | Stop reason: SDUPTHER

## 2021-07-14 ENCOUNTER — PATIENT MESSAGE (OUTPATIENT)
Dept: FAMILY MEDICINE CLINIC | Facility: CLINIC | Age: 81
End: 2021-07-14

## 2021-07-14 DIAGNOSIS — F51.01 PRIMARY INSOMNIA: ICD-10-CM

## 2021-07-14 RX ORDER — TEMAZEPAM 30 MG/1
30 CAPSULE ORAL NIGHTLY PRN
Qty: 30 CAPSULE | Refills: 1 | Status: SHIPPED | OUTPATIENT
Start: 2021-07-14 | End: 2021-07-26 | Stop reason: SDUPTHER

## 2021-07-26 DIAGNOSIS — F51.01 PRIMARY INSOMNIA: ICD-10-CM

## 2021-07-26 RX ORDER — TEMAZEPAM 30 MG/1
30 CAPSULE ORAL NIGHTLY PRN
Qty: 30 CAPSULE | Refills: 1 | Status: SHIPPED | OUTPATIENT
Start: 2021-07-26 | End: 2021-09-21 | Stop reason: SDUPTHER

## 2021-08-09 ENCOUNTER — HOSPITAL ENCOUNTER (OUTPATIENT)
Dept: PET IMAGING | Facility: HOSPITAL | Age: 81
Discharge: HOME | End: 2021-08-09
Admitting: THORACIC SURGERY (CARDIOTHORACIC VASCULAR SURGERY)

## 2021-08-09 DIAGNOSIS — C34.2 PRIMARY CANCER OF RIGHT MIDDLE LOBE OF LUNG (HCC): ICD-10-CM

## 2021-08-09 PROCEDURE — 71250 CT THORAX DX C-: CPT

## 2021-08-23 NOTE — PROGRESS NOTES
"Chief Complaint  Lung cancer, follow-up    Subjective          June E Fifi presents to [facility] in follow-up for continued pulmonary surveillance    History of Present Illness    Ms. Calix is a pleasant 81-year-old lady who presents today to review her most recent CT of the chest.  She is s/p right middle lobectomy in July 2020 for a stage I T1BN0 non-small cell lung cancer.  She has done well postoperatively and is able to complete her daily activities without difficulty.  She has no new complaints today.      Objective   Vital Signs:   /84 (BP Location: Left arm, Patient Position: Sitting, Cuff Size: Large Adult)   Pulse 90   Temp 97.8 °F (36.6 °C) (Infrared)   Ht 163.8 cm (64.5\")   Wt 108 kg (238 lb)   SpO2 94%   BMI 40.22 kg/m²     Physical Exam  Constitutional:       Appearance: Normal appearance.   HENT:      Head: Normocephalic and atraumatic.   Cardiovascular:      Rate and Rhythm: Normal rate and regular rhythm.   Pulmonary:      Effort: Pulmonary effort is normal.      Breath sounds: Normal breath sounds.   Musculoskeletal:         General: Normal range of motion.      Cervical back: Normal range of motion and neck supple.   Skin:     General: Skin is warm and dry.   Neurological:      General: No focal deficit present.      Mental Status: She is alert and oriented to person, place, and time.   Psychiatric:         Mood and Affect: Mood normal.         Behavior: Behavior normal.          Result Review :            I have independently reviewed CT of the chest performed on 8/9/2021 which demonstrates postoperative changes consistent with a right middle lobectomy.  There is a stable 6 mm noncalcified nodule in the right upper lobe in addition to subcentimeter nodular densities in the bilateral upper lobes.  There is a stable left apical pleural lipoma. There are no new pulmonary nodules or infiltrates.  There is no suspicious mediastinal lymphadenopathy.  " No pleural or pericardial effusion.           Assessment and Plan    Diagnoses and all orders for this visit:    1. Right middle lobe pulmonary nodule (Primary)  -     CT Chest Without Contrast; Future    2. Primary cancer of right middle lobe of lung (CMS/HCC)  -     CT Chest Without Contrast; Future    3. Former smoker  -     CT Chest Without Contrast; Future       Ms. Calix presents today in her baseline state of health.  She is s/p right middle lobe resection for a stage I adenocarcinoma in July 2020.  Her most recent chest CT demonstrates no evidence of recurrence.  There are some subcentimeter pulmonary nodules, which are stable.  We will continue with her pulmonary surveillance with a CT of the chest 6 months from now per Fleischner guidelines followed by an office visit to review the findings.      I spent 26 minutes caring for June on this date of service. This time includes time spent by me in the following activities:preparing for the visit, reviewing tests, performing a medically appropriate examination and/or evaluation , counseling and educating the patient/family/caregiver, ordering medications, tests, or procedures, documenting information in the medical record, independently interpreting results and communicating that information with the patient/family/caregiver and care coordination     Follow Up   Return in about 6 months (around 2/24/2022) for Recheck.  Patient was given instructions and counseling regarding her condition or for health maintenance advice. Please see specific information pulled into the AVS if appropriate.

## 2021-08-24 ENCOUNTER — OFFICE VISIT (OUTPATIENT)
Dept: SURGERY | Facility: CLINIC | Age: 81
End: 2021-08-24

## 2021-08-24 VITALS
DIASTOLIC BLOOD PRESSURE: 84 MMHG | TEMPERATURE: 97.8 F | OXYGEN SATURATION: 94 % | HEART RATE: 90 BPM | HEIGHT: 65 IN | BODY MASS INDEX: 39.65 KG/M2 | WEIGHT: 238 LBS | SYSTOLIC BLOOD PRESSURE: 159 MMHG

## 2021-08-24 DIAGNOSIS — C34.2 PRIMARY CANCER OF RIGHT MIDDLE LOBE OF LUNG (HCC): ICD-10-CM

## 2021-08-24 DIAGNOSIS — Z87.891 FORMER SMOKER: ICD-10-CM

## 2021-08-24 DIAGNOSIS — R91.1 RIGHT MIDDLE LOBE PULMONARY NODULE: Primary | ICD-10-CM

## 2021-08-24 PROCEDURE — 99213 OFFICE O/P EST LOW 20 MIN: CPT | Performed by: NURSE PRACTITIONER

## 2021-08-24 RX ORDER — FAMOTIDINE 40 MG/1
TABLET, FILM COATED ORAL
COMMUNITY
Start: 2021-07-21

## 2021-09-21 DIAGNOSIS — F51.01 PRIMARY INSOMNIA: ICD-10-CM

## 2021-09-21 RX ORDER — TEMAZEPAM 30 MG/1
30 CAPSULE ORAL NIGHTLY PRN
Qty: 90 CAPSULE | Refills: 0 | Status: SHIPPED | OUTPATIENT
Start: 2021-09-21 | End: 2021-12-07

## 2021-09-30 RX ORDER — DILTIAZEM HYDROCHLORIDE 180 MG/1
180 CAPSULE, COATED, EXTENDED RELEASE ORAL DAILY
Qty: 90 CAPSULE | Refills: 1 | Status: SHIPPED | OUTPATIENT
Start: 2021-09-30 | End: 2021-10-04 | Stop reason: SDUPTHER

## 2021-09-30 NOTE — TELEPHONE ENCOUNTER
Rx Refill Note  Requested Prescriptions     Pending Prescriptions Disp Refills   • dilTIAZem CD (CARDIZEM CD) 180 MG 24 hr capsule 90 capsule 1     Sig: Take 1 capsule by mouth Daily.      Last office visit with prescribing clinician: 10/26/2020      Next office visit with prescribing clinician: 10/28/2021            Valorie Anne MA  09/30/21, 09:44 EDT

## 2021-10-04 RX ORDER — DILTIAZEM HYDROCHLORIDE 180 MG/1
180 CAPSULE, COATED, EXTENDED RELEASE ORAL DAILY
Qty: 90 CAPSULE | Refills: 1 | Status: SHIPPED | OUTPATIENT
Start: 2021-10-04 | End: 2021-10-11 | Stop reason: SDUPTHER

## 2021-10-04 NOTE — TELEPHONE ENCOUNTER
Rx Refill Note  Requested Prescriptions      No prescriptions requested or ordered in this encounter      Last office visit with prescribing clinician: 10/26/2020      Next office visit with prescribing clinician: 10/28/2021            Valorie Anne MA  10/04/21, 08:43 EDT

## 2021-10-11 RX ORDER — LISINOPRIL AND HYDROCHLOROTHIAZIDE 20; 12.5 MG/1; MG/1
1 TABLET ORAL DAILY
Qty: 30 TABLET | Refills: 1 | Status: SHIPPED | OUTPATIENT
Start: 2021-10-11 | End: 2021-12-13

## 2021-10-11 RX ORDER — DILTIAZEM HYDROCHLORIDE 180 MG/1
180 CAPSULE, COATED, EXTENDED RELEASE ORAL DAILY
Qty: 30 CAPSULE | Refills: 1 | Status: SHIPPED | OUTPATIENT
Start: 2021-10-11 | End: 2022-06-09 | Stop reason: SDUPTHER

## 2021-10-28 ENCOUNTER — OFFICE VISIT (OUTPATIENT)
Dept: CARDIOLOGY | Facility: CLINIC | Age: 81
End: 2021-10-28

## 2021-10-28 VITALS
BODY MASS INDEX: 41.32 KG/M2 | DIASTOLIC BLOOD PRESSURE: 74 MMHG | SYSTOLIC BLOOD PRESSURE: 120 MMHG | WEIGHT: 248 LBS | OXYGEN SATURATION: 93 % | HEIGHT: 65 IN | HEART RATE: 84 BPM

## 2021-10-28 DIAGNOSIS — G47.33 OBSTRUCTIVE SLEEP APNEA: ICD-10-CM

## 2021-10-28 DIAGNOSIS — I10 ESSENTIAL HYPERTENSION: Primary | ICD-10-CM

## 2021-10-28 PROCEDURE — 99212 OFFICE O/P EST SF 10 MIN: CPT | Performed by: INTERNAL MEDICINE

## 2021-10-28 NOTE — PROGRESS NOTES
Subjective:     Encounter Date:10/28/2021      Patient ID: Leann Calix is a 81 y.o. female.    Chief Complaint:  History of Present Illness 81-year-old white female with history of sleep apnea hypertension presents to my office for follow-up.  Patient is currently stable without any symptoms of chest pain or shortness of breath at rest on exertion.  No complaints any PND orthopnea.  No palpitation dizziness syncope or swelling of the feet.  She is taking her medicine regularly.  She is also trying to use her CPAP machine regularly.    The following portions of the patient's history were reviewed and updated as appropriate: allergies, current medications, past family history, past medical history, past social history, past surgical history and problem list.  Past Medical History:   Diagnosis Date   • Adenocarcinoma, lung, right (HCC) 2020   • Allergic 2007    Bisoprolol   • Back pain     LOW RADICULAR PAIN LEFT LEG   • Chronic insomnia    • Fatty liver    • Ganglion cyst    • H/O degenerative disc disease    • Hiatal hernia    • Hiatal hernia    • Hypertension     DR ONEAL   • Obesity 1985    I'm now losing weight on HMR program   • Osteoarthritis 2002   • Pneumonia 1945   • Sleep apnea     uses BiPAP   • Spondylolisthesis 1989    Surgery   • Uterine cancer (HCC) 1984     Past Surgical History:   Procedure Laterality Date   • BACK SURGERY  1989/2002/2009    Spondylolisthesis   • BREAST BIOPSY  1971   • CARDIAC CATHETERIZATION  06/2018    NL DR ONEAL   • CHOLECYSTECTOMY  1996   • COLONOSCOPY  2016    Clean colon found.   • EYE SURGERY Bilateral 2006    russ Lambert   • HIP ARTHROPLASTY Right 06/07/2010    DR PACKER   • HYSTERECTOMY  1984    CARCINOMA IN SITU   • JOINT REPLACEMENT  2007    First knee replacement.   • LAMINECTOMY  1989    During first back surgery.   • LUMBAR DISCECTOMY  2006    DR VOGEL   • LUMBAR FUSION  2002    L3/5 DR PAPPAS   • LUMBAR LAMINECTOMY  1989    LUMBAR SPINAL FUSION  "L4/L5 DR BRUCE   • OOPHORECTOMY Right 1996   • PAROTIDECTOMY Right 2004   • PARTIAL HIP ARTHROPLASTY  03/16/2017    Dr Michel   • REVISION TOTAL KNEE ARTHROPLASTY Left 03/16/2017    DR MICHEL   • ROTATOR CUFF REPAIR Right 2003   • SHOULDER SURGERY  2003    Right rotator cuff.   • SPINAL FUSION  1989    Spondylolisthesis   • SPINE SURGERY  1989    Spondylolisthesis   • THORACOSCOPY Right 7/29/2020    Procedure: RIGHT VIDEO ASSISTED THORACOSCOPY WITH RIGHT MIDDLE LOBE THERAPUTIC WEDGE RESECTION AND RIGHT MIDDLE LOBECTOMY; HILAR LYMPH NODE DISSECTION;  Surgeon: Jaden Fagan MD;  Location: [facility] MAIN OR;  Service: Cardiothoracic;  Laterality: Right;   • THYROID BIOPSY  01/2018    X5   • THYROID SURGERY      nodules removed   • TOTAL KNEE ARTHROPLASTY  2009    DR MICHEL   • TUBAL ABDOMINAL LIGATION Bilateral 1976     /74 (BP Location: Left arm, Patient Position: Sitting)   Pulse 84   Ht 163.8 cm (64.5\")   Wt 112 kg (248 lb)   SpO2 93%   BMI 41.91 kg/m²   Family History   Problem Relation Age of Onset   • Cancer Mother         Don't know where cancer began.   • Kidney disease Mother    • Thyroid disease Sister    • Allergies Sister    • Allergies Brother    • Cancer Daughter         Ewings Sarcoma   • Other Daughter         EWINGS SARCOMA   • Cancer Other    • Alcohol abuse Maternal Grandfather    • Arthritis Maternal Grandmother    • Cancer Maternal Aunt         Breast cancer       Current Outpatient Medications:   •  dilTIAZem CD (CARDIZEM CD) 180 MG 24 hr capsule, Take 1 capsule by mouth Daily., Disp: 30 capsule, Rfl: 1  •  famotidine (PEPCID) 40 MG tablet, , Disp: , Rfl:   •  lisinopril-hydrochlorothiazide (PRINZIDE,ZESTORETIC) 20-12.5 MG per tablet, Take 1 tablet by mouth Daily., Disp: 30 tablet, Rfl: 1  •  Multiple Vitamins-Minerals (MULTIVITAMIN ADULTS PO), Take 1 tablet by mouth Daily., Disp: , Rfl:   •  temazepam (RESTORIL) 30 MG capsule, Take 1 capsule by mouth At Night As Needed for Sleep., " Disp: 90 capsule, Rfl: 0  •  Unable to find, CPAP MACHINE, Disp: , Rfl:   Allergies   Allergen Reactions   • Beta Adrenergic Blockers Other (See Comments)     Couldn't breathe or move   • Bisoprolol Unknown - High Severity   • Molds & Smuts Unknown - High Severity     Social History     Socioeconomic History   • Marital status:    Tobacco Use   • Smoking status: Former Smoker     Packs/day: 1.00     Years: 30.00     Pack years: 30.00     Types: Cigarettes     Start date: 6/12/1976     Quit date: 6/12/2006     Years since quitting: 15.3   • Smokeless tobacco: Never Used   Substance and Sexual Activity   • Alcohol use: No   • Drug use: No   • Sexual activity: Defer     Review of Systems   Constitutional: Negative for fever and malaise/fatigue.   Cardiovascular: Negative for chest pain, dyspnea on exertion and palpitations.   Respiratory: Negative for cough and shortness of breath.    Skin: Negative for rash.   Gastrointestinal: Negative for abdominal pain, nausea and vomiting.   Neurological: Negative for focal weakness and headaches.   All other systems reviewed and are negative.             Objective:     Constitutional:       Appearance: Well-developed.   Eyes:      General: No scleral icterus.     Conjunctiva/sclera: Conjunctivae normal.   HENT:      Head: Normocephalic and atraumatic.   Neck:      Vascular: No carotid bruit or JVD.   Pulmonary:      Effort: Pulmonary effort is normal.      Breath sounds: Normal breath sounds. No wheezing. No rales.   Cardiovascular:      Normal rate. Regular rhythm.   Pulses:     Intact distal pulses.   Abdominal:      General: Bowel sounds are normal.      Palpations: Abdomen is soft.   Musculoskeletal:      Cervical back: Normal range of motion and neck supple. Skin:     General: Skin is warm and dry.      Findings: No rash.   Neurological:      Mental Status: Alert.       Procedures    Lab Review:         MDM  1.  Hypertension  Patient blood pressure currently stable  on diltiazem and lisinopril  2.  Sleep apnea  Patient had sleep apnea uses a CPAP machine.      Patient's previous medical records, labs, and EKG were reviewed and discussed with the patient at today's visit.

## 2021-12-07 ENCOUNTER — OFFICE VISIT (OUTPATIENT)
Dept: FAMILY MEDICINE CLINIC | Facility: CLINIC | Age: 81
End: 2021-12-07

## 2021-12-07 VITALS
TEMPERATURE: 97.2 F | DIASTOLIC BLOOD PRESSURE: 96 MMHG | SYSTOLIC BLOOD PRESSURE: 194 MMHG | OXYGEN SATURATION: 94 % | BODY MASS INDEX: 42.15 KG/M2 | HEART RATE: 108 BPM | WEIGHT: 253 LBS | HEIGHT: 65 IN

## 2021-12-07 DIAGNOSIS — R53.83 FATIGUE, UNSPECIFIED TYPE: ICD-10-CM

## 2021-12-07 DIAGNOSIS — Z00.00 MEDICARE ANNUAL WELLNESS VISIT, SUBSEQUENT: Primary | ICD-10-CM

## 2021-12-07 DIAGNOSIS — Z78.0 POSTMENOPAUSAL STATUS: ICD-10-CM

## 2021-12-07 DIAGNOSIS — Z12.31 ENCOUNTER FOR SCREENING MAMMOGRAM FOR MALIGNANT NEOPLASM OF BREAST: ICD-10-CM

## 2021-12-07 DIAGNOSIS — Z23 NEED FOR VACCINATION: ICD-10-CM

## 2021-12-07 DIAGNOSIS — R73.9 HYPERGLYCEMIA: ICD-10-CM

## 2021-12-07 DIAGNOSIS — I10 ESSENTIAL HYPERTENSION: ICD-10-CM

## 2021-12-07 DIAGNOSIS — E78.5 HYPERLIPIDEMIA, UNSPECIFIED HYPERLIPIDEMIA TYPE: ICD-10-CM

## 2021-12-07 DIAGNOSIS — H66.001 NON-RECURRENT ACUTE SUPPURATIVE OTITIS MEDIA OF RIGHT EAR WITHOUT SPONTANEOUS RUPTURE OF TYMPANIC MEMBRANE: ICD-10-CM

## 2021-12-07 PROCEDURE — 1170F FXNL STATUS ASSESSED: CPT | Performed by: FAMILY MEDICINE

## 2021-12-07 PROCEDURE — 1159F MED LIST DOCD IN RCRD: CPT | Performed by: FAMILY MEDICINE

## 2021-12-07 PROCEDURE — G0439 PPPS, SUBSEQ VISIT: HCPCS | Performed by: FAMILY MEDICINE

## 2021-12-07 PROCEDURE — 99213 OFFICE O/P EST LOW 20 MIN: CPT | Performed by: FAMILY MEDICINE

## 2021-12-07 PROCEDURE — G0009 ADMIN PNEUMOCOCCAL VACCINE: HCPCS | Performed by: FAMILY MEDICINE

## 2021-12-07 PROCEDURE — 90732 PPSV23 VACC 2 YRS+ SUBQ/IM: CPT | Performed by: FAMILY MEDICINE

## 2021-12-07 RX ORDER — AMOXICILLIN 500 MG/1
500 CAPSULE ORAL 3 TIMES DAILY
Qty: 30 CAPSULE | Refills: 0 | Status: SHIPPED | OUTPATIENT
Start: 2021-12-07 | End: 2022-02-11

## 2021-12-07 NOTE — PROGRESS NOTES
The ABCs of the Annual Wellness Visit  Subsequent Medicare Wellness Visit    Chief Complaint   Patient presents with   • Medicare Wellness-subsequent   • Immunizations     PNA23, requested      Subjective    History of Present Illness:  Leann Calix is a 81 y.o. female who presents for a Subsequent Medicare Wellness Visit. Left side groin area pain, some better after physical therapy, but still having some pain. Bilateral shoulder pain.  Right ear pain.    The following portions of the patient's history were reviewed and   updated as appropriate: allergies, current medications, past family history, past medical history, past social history, past surgical history and problem list.    Compared to one year ago, the patient feels her physical   health is the same.    Compared to one year ago, the patient feels her mental   health is the same.    Recent Hospitalizations:  She was not admitted to the hospital during the last year.       Current Medical Providers:  Patient Care Team:  Ryann Simon MD as PCP - General  Ryann Simon MD as PCP - Family Medicine  Thuan Hickman MD as Consulting Physician (Cardiology)  Eddie Meeks MD as Consulting Physician (Otolaryngology)  Emi Leary APRN as Nurse Practitioner (Nurse Practitioner)  Jaden Fagan MD as Surgeon (Thoracic Surgery)    Outpatient Medications Prior to Visit   Medication Sig Dispense Refill   • dilTIAZem CD (CARDIZEM CD) 180 MG 24 hr capsule Take 1 capsule by mouth Daily. 30 capsule 1   • famotidine (PEPCID) 40 MG tablet      • lisinopril-hydrochlorothiazide (PRINZIDE,ZESTORETIC) 20-12.5 MG per tablet Take 1 tablet by mouth Daily. 30 tablet 1   • Multiple Vitamins-Minerals (MULTIVITAMIN ADULTS PO) Take 1 tablet by mouth Daily.     • Unable to find CPAP MACHINE     • temazepam (RESTORIL) 30 MG capsule Take 1 capsule by mouth At Night As Needed for Sleep. 90 capsule 0     No facility-administered medications prior to visit.       No  "opioid medication identified on active medication list. I have reviewed chart for other potential  high risk medication/s and harmful drug interactions in the elderly.          Aspirin is not on active medication list.  Aspirin use is not indicated based on review of current medical condition/s. Risk of harm outweighs potential benefits.  .    Patient Active Problem List   Diagnosis   • Inflammatory polyarthropathy (HCC)   • Essential hypertension   • Status post hip replacement   • Thyroid nodule   • Degeneration of intervertebral disc   • Depression   • Fatty liver   • Insomnia   • Low back pain   • Lumbosacral radiculopathy   • Spinal stenosis of lumbar region   • Obesity (BMI 30-39.9)   • Postmenopausal status   • Tobacco dependence in remission   • Encounter for screening mammogram for malignant neoplasm of breast    • Dry eye syndrome, bilateral   • Fuchs' corneal dystrophy   • Lens replaced by other means   • Ocular hypertension, bilateral   • Open angle with borderline findings, low risk, bilateral   • Pseudophakia of both eyes   • PVD (posterior vitreous detachment), both eyes   • Vitreous degeneration   • Sleep apnea   • Primary cancer of right middle lobe of lung (HCC)   • Medicare annual wellness visit, subsequent   • Right upper lobe pulmonary nodule   • Ganglion cyst of right foot   • Urge incontinence   • Hyperglycemia   • Fatigue   • Hyperlipidemia   • Non-recurrent acute suppurative otitis media of right ear without spontaneous rupture of tympanic membrane     Advance Care Planning  Advance Directive is on file.  ACP discussion was held with the patient during this visit. Patient has an advance directive in EMR which is still valid.           Objective    Vitals:    12/07/21 1313   BP: (!) 192/76   BP Location: Right arm   Patient Position: Sitting   Cuff Size: Adult   Pulse: 108   Temp: 97.2 °F (36.2 °C)   TempSrc: Infrared   SpO2: 94%   Weight: 115 kg (253 lb)   Height: 163.8 cm (64.5\")     BMI " Readings from Last 1 Encounters:   12/07/21 42.76 kg/m²   BMI is above normal parameters. Recommendations include: exercise counseling    Does the patient have evidence of cognitive impairment? No    Physical Exam  Vitals and nursing note reviewed.   Constitutional:       General: She is not in acute distress.     Appearance: She is well-developed.   HENT:      Head: Normocephalic.      Right Ear: Tympanic membrane is injected.      Mouth/Throat:      Mouth: Mucous membranes are moist.   Eyes:      General: Lids are normal.      Conjunctiva/sclera: Conjunctivae normal.      Pupils: Pupils are equal, round, and reactive to light.   Neck:      Thyroid: No thyroid mass or thyromegaly.      Trachea: Trachea normal.   Cardiovascular:      Rate and Rhythm: Normal rate and regular rhythm.      Heart sounds: Murmur heard.       Pulmonary:      Effort: Pulmonary effort is normal.      Breath sounds: Normal breath sounds.   Abdominal:      Palpations: Abdomen is soft.   Musculoskeletal:      Cervical back: Normal range of motion.   Lymphadenopathy:      Cervical: No cervical adenopathy.   Skin:     General: Skin is warm and dry.   Neurological:      General: No focal deficit present.      Mental Status: She is alert and oriented to person, place, and time.   Psychiatric:         Attention and Perception: She is attentive.         Mood and Affect: Mood normal.         Speech: Speech normal.         Behavior: Behavior normal.                 HEALTH RISK ASSESSMENT    Smoking Status:  Social History     Tobacco Use   Smoking Status Former Smoker   • Packs/day: 1.00   • Years: 30.00   • Pack years: 30.00   • Types: Cigarettes   • Start date: 6/12/1976   • Quit date: 6/12/2006   • Years since quitting: 15.4   Smokeless Tobacco Never Used     Alcohol Consumption:  Social History     Substance and Sexual Activity   Alcohol Use No     Fall Risk Screen:    STEADI Fall Risk Assessment was completed, and patient is at LOW risk for  falls.Assessment completed on:12/7/2021    Depression Screening:  PHQ-2/PHQ-9 Depression Screening 12/7/2021   Little interest or pleasure in doing things 0   Feeling down, depressed, or hopeless 0   Trouble falling or staying asleep, or sleeping too much -   Feeling tired or having little energy -   Poor appetite or overeating -   Feeling bad about yourself - or that you are a failure or have let yourself or your family down -   Trouble concentrating on things, such as reading the newspaper or watching television -   Moving or speaking so slowly that other people could have noticed. Or the opposite - being so fidgety or restless that you have been moving around a lot more than usual -   Thoughts that you would be better off dead, or of hurting yourself in some way -   Total Score 0   If you checked off any problems, how difficult have these problems made it for you to do your work, take care of things at home, or get along with other people? -       Health Habits and Functional and Cognitive Screening:  Functional & Cognitive Status 12/7/2021   Do you have difficulty preparing food and eating? No   Do you have difficulty bathing yourself, getting dressed or grooming yourself? No   Do you have difficulty using the toilet? No   Do you have difficulty moving around from place to place? No   Do you have trouble with steps or getting out of a bed or a chair? No   Current Diet Well Balanced Diet   Dental Exam Up to date   Eye Exam Up to date   Exercise (times per week) 7 times per week   Current Exercises Include Walking   Current Exercise Activities Include -   Do you need help using the phone?  No   Are you deaf or do you have serious difficulty hearing?  No   Do you need help with transportation? No   Do you need help shopping? No   Do you need help preparing meals?  No   Do you need help with housework?  No   Do you need help with laundry? No   Do you need help taking your medications? No   Do you need help managing  money? -   Do you ever drive or ride in a car without wearing a seat belt? No   Have you felt unusual stress, anger or loneliness in the last month? No   Who do you live with? Alone   If you need help, do you have trouble finding someone available to you? No   Have you been bothered in the last four weeks by sexual problems? -   Do you have difficulty concentrating, remembering or making decisions? No       Age-appropriate Screening Schedule:  Refer to the list below for future screening recommendations based on patient's age, sex and/or medical conditions. Orders for these recommended tests are listed in the plan section. The patient has been provided with a written plan.    Health Maintenance   Topic Date Due   • TDAP/TD VACCINES (1 - Tdap) Never done   • LIPID PANEL  11/21/2020   • DXA SCAN  12/02/2021   • INFLUENZA VACCINE  Completed   • ZOSTER VACCINE  Completed              Assessment/Plan   CMS Preventative Services Quick Reference  Risk Factors Identified During Encounter  Immunizations Discussed/Encouraged (specific Immunizations; Pneumococcal 23  The above risks/problems have been discussed with the patient.  Follow up actions/plans if indicated are seen below in the Assessment/Plan Section.  Pertinent information has been shared with the patient in the After Visit Summary.    Diagnoses and all orders for this visit:    1. Medicare annual wellness visit, subsequent (Primary)    2. Postmenopausal status  -     DEXA Bone Density Axial    3. Encounter for screening mammogram for malignant neoplasm of breast   -     Mammo Screening Digital Tomosynthesis Bilateral With CAD; Future    4. Essential hypertension    5. Hyperlipidemia, unspecified hyperlipidemia type  -     Lipid Panel    6. Hyperglycemia  -     Hemoglobin A1c    7. Fatigue, unspecified type  -     Vitamin B12  -     TSH    8. Non-recurrent acute suppurative otitis media of right ear without spontaneous rupture of tympanic membrane  -      amoxicillin (AMOXIL) 500 MG capsule; Take 1 capsule by mouth 3 (Three) Times a Day.  Dispense: 30 capsule; Refill: 0        Follow Up:   Return in about 1 year (around 12/7/2022) for Medicare Wellness.     An After Visit Summary and PPPS were made available to the patient.

## 2021-12-07 NOTE — PATIENT INSTRUCTIONS
Medicare Wellness  Personal Prevention Plan of Service     Date of Office Visit:  2021  Encounter Provider:  Ryann Simon MD  Place of Service:  [facility]  Patient Name: Leann Calix  :  1940    As part of the Medicare Wellness portion of your visit today, we are providing you with this personalized preventive plan of services (PPPS). This plan is based upon recommendations of the [place] Preventive Services Task Force (USPSTF) and the Advisory Committee on Immunization Practices (ACIP).    This lists the preventive care services that should be considered, and provides dates of when you are due. Items listed as completed are up-to-date and do not require any further intervention.    Health Maintenance   Topic Date Due   • TDAP/TD VACCINES (1 - Tdap) Never done   • Pneumococcal Vaccine 65+ (2 of 2 - PPSV23) 2019   • LIPID PANEL  2020   • DXA SCAN  2021   • ANNUAL WELLNESS VISIT  2022   • COVID-19 Vaccine  Completed   • INFLUENZA VACCINE  Completed   • ZOSTER VACCINE  Completed   • LUNG CANCER SCREENING  Discontinued       Orders Placed This Encounter   Procedures   • DEXA Bone Density Axial     Scheduling Instructions:      [facility]     Order Specific Question:   Reason for Exam:     Answer:   screening for osteoporosis   • Mammo Screening Digital Tomosynthesis Bilateral With CAD     [facility]     Standing Status:   Future     Standing Expiration Date:   2022     Scheduling Instructions:      [facility]     Order Specific Question:   Reason for Exam:     Answer:   screening for breast cancer   • Vitamin B12     Order Specific Question:   Release to patient     Answer:   Immediate   • Lipid Panel   • TSH     Order Specific Question:   Release to patient     Answer:   Immediate   • Hemoglobin A1c     Order Specific Question:   Release to patient     Answer:   Immediate       Return in about 1 year  (around 12/7/2022) for Medicare Wellness.

## 2021-12-13 RX ORDER — LISINOPRIL AND HYDROCHLOROTHIAZIDE 20; 12.5 MG/1; MG/1
TABLET ORAL
Qty: 90 TABLET | Refills: 3 | Status: SHIPPED | OUTPATIENT
Start: 2021-12-13 | End: 2022-06-09 | Stop reason: SDUPTHER

## 2021-12-13 NOTE — TELEPHONE ENCOUNTER
Rx Refill Note  Requested Prescriptions     Pending Prescriptions Disp Refills   • lisinopril-hydrochlorothiazide (PRINZIDE,ZESTORETIC) 20-12.5 MG per tablet [Pharmacy Med Name: LISINOPRIL/HYDROCHLOROTHIAZIDE 20-12.5 MG Tablet] 90 tablet      Sig: TAKE 1 TABLET EVERY DAY      Last office visit with prescribing clinician: 10/28/2021      Next office visit with prescribing clinician: 10/31/2022            Kike Rojas MA  12/13/21, 16:41 EST

## 2021-12-15 DIAGNOSIS — R79.89 ABNORMAL TSH: Primary | ICD-10-CM

## 2021-12-15 LAB
AMBIG ABBREV LP DEFAULT: NORMAL
CHOLEST SERPL-MCNC: 199 MG/DL (ref 100–199)
HBA1C MFR BLD: 5.7 % (ref 4.8–5.6)
HDLC SERPL-MCNC: 71 MG/DL
LDLC SERPL CALC-MCNC: 109 MG/DL (ref 0–99)
TRIGL SERPL-MCNC: 109 MG/DL (ref 0–149)
TSH SERPL DL<=0.005 MIU/L-ACNC: 0.41 UIU/ML (ref 0.45–4.5)
VIT B12 SERPL-MCNC: 1490 PG/ML (ref 232–1245)
VLDLC SERPL CALC-MCNC: 19 MG/DL (ref 5–40)

## 2021-12-17 ENCOUNTER — PATIENT MESSAGE (OUTPATIENT)
Dept: FAMILY MEDICINE CLINIC | Facility: CLINIC | Age: 81
End: 2021-12-17

## 2021-12-17 DIAGNOSIS — F51.01 PRIMARY INSOMNIA: Primary | ICD-10-CM

## 2021-12-17 RX ORDER — TEMAZEPAM 30 MG/1
30 CAPSULE ORAL NIGHTLY PRN
Qty: 90 CAPSULE | Refills: 0 | Status: SHIPPED | OUTPATIENT
Start: 2021-12-17 | End: 2022-02-01 | Stop reason: SDUPTHER

## 2021-12-18 NOTE — PROGRESS NOTES
Patient is seen in postoperative follow-up after right middle lobectomy.  T1 be N0 M0 stage Ia.  Patient's wounds are healing well.      She has a contact dermatitis rash on her back which which is itching.  I recommended that she take Benadryl and follow-up with primary care.  The rash is not present on her abdomen or chest.  This problem is separate from her surgical issue with her lung cancer.  I have instructed her about rash management about antipruritics and about being aware of any change in detergents that she may have used.  Answers for HPI/ROS submitted by the patient on 8/4/2020   What is the primary reason for your visit?: Other  Please describe your symptoms.: Follow up to surgery on July 29, 2020.  Have you had these symptoms before?: No  How long have you been having these symptoms?: 1-4 days     Acute on chronic renal failure

## 2022-01-12 LAB
T4 FREE SERPL-MCNC: 1.12 NG/DL (ref 0.82–1.77)
TSH SERPL DL<=0.005 MIU/L-ACNC: 0.65 UIU/ML (ref 0.45–4.5)

## 2022-01-31 ENCOUNTER — PATIENT MESSAGE (OUTPATIENT)
Dept: FAMILY MEDICINE CLINIC | Facility: CLINIC | Age: 82
End: 2022-01-31

## 2022-01-31 DIAGNOSIS — F51.01 PRIMARY INSOMNIA: ICD-10-CM

## 2022-02-01 RX ORDER — TEMAZEPAM 30 MG/1
30 CAPSULE ORAL NIGHTLY PRN
Qty: 90 CAPSULE | Refills: 0 | Status: SHIPPED | OUTPATIENT
Start: 2022-02-01 | End: 2022-05-24

## 2022-02-11 ENCOUNTER — OFFICE VISIT (OUTPATIENT)
Dept: FAMILY MEDICINE CLINIC | Facility: CLINIC | Age: 82
End: 2022-02-11

## 2022-02-11 VITALS
WEIGHT: 256 LBS | HEART RATE: 86 BPM | OXYGEN SATURATION: 96 % | SYSTOLIC BLOOD PRESSURE: 162 MMHG | DIASTOLIC BLOOD PRESSURE: 87 MMHG | BODY MASS INDEX: 43.26 KG/M2 | TEMPERATURE: 97.7 F

## 2022-02-11 DIAGNOSIS — J01.00 ACUTE NON-RECURRENT MAXILLARY SINUSITIS: Primary | ICD-10-CM

## 2022-02-11 PROCEDURE — 99213 OFFICE O/P EST LOW 20 MIN: CPT | Performed by: FAMILY MEDICINE

## 2022-02-11 RX ORDER — CEFDINIR 300 MG/1
300 CAPSULE ORAL 2 TIMES DAILY
Qty: 20 CAPSULE | Refills: 0 | Status: SHIPPED | OUTPATIENT
Start: 2022-02-11 | End: 2022-02-26

## 2022-02-11 RX ORDER — PREDNISONE 10 MG/1
10 TABLET ORAL 2 TIMES DAILY
Qty: 10 TABLET | Refills: 0 | Status: SHIPPED | OUTPATIENT
Start: 2022-02-11 | End: 2022-02-26

## 2022-02-11 NOTE — PROGRESS NOTES
Chief Complaint  Follow-up (fatigue since 12-25) and Nasal Congestion (nose bleeds, yellow mucus )    Subjective          Leann Calix presents to [facility]  She is using CPAP without improvement.  Recent labs are normal.    Fatigue  This is a new problem. The current episode started more than 1 month ago. The problem occurs constantly. Associated symptoms include congestion and fatigue. Pertinent negatives include no chest pain, chills, coughing, fever, headaches, rash, sore throat, vomiting or weakness. Associated symptoms comments: Yellow sinus drainage. She has tried rest and acetaminophen (Benadryl, Zyrtec) for the symptoms. The treatment provided no relief.       Objective   Vital Signs:   /87 (BP Location: Left arm, Patient Position: Sitting, Cuff Size: Adult)   Pulse 86   Temp 97.7 °F (36.5 °C) (Infrared)   Wt 116 kg (256 lb)   SpO2 96%   BMI 43.26 kg/m²     Physical Exam  Constitutional:       General: She is not in acute distress.     Appearance: She is well-developed.   HENT:      Head: Normocephalic.   Eyes:      General: Lids are normal.      Conjunctiva/sclera: Conjunctivae normal.   Neck:      Thyroid: No thyroid mass or thyromegaly.      Trachea: Trachea normal.   Cardiovascular:      Rate and Rhythm: Normal rate and regular rhythm.      Heart sounds: Normal heart sounds.   Pulmonary:      Effort: Pulmonary effort is normal.      Breath sounds: Normal breath sounds.   Abdominal:      Palpations: Abdomen is soft.   Musculoskeletal:      Cervical back: Normal range of motion.   Lymphadenopathy:      Cervical: No cervical adenopathy.   Skin:     General: Skin is warm and dry.   Neurological:      Mental Status: She is alert and oriented to person, place, and time.   Psychiatric:         Attention and Perception: She is attentive.         Mood and Affect: Mood normal.         Speech: Speech normal.         Behavior: Behavior normal.        Result Review :    The following data was reviewed by: Ryann Simon MD on 02/11/2022:  Common labs    Common Labsle 4/7/21 12/14/21 12/14/21     1320 1320   Creatinine 0.80     Total Cholesterol  199    Triglycerides  109    HDL Cholesterol  71    LDL Cholesterol   109 (A)    Hemoglobin A1C   5.7 (A)   (A) Abnormal value       Comments are available for some flowsheets but are not being displayed.                     Assessment and Plan    Diagnoses and all orders for this visit:    1. Acute non-recurrent maxillary sinusitis (Primary)  -     Discontinue: cefdinir (OMNICEF) 300 MG capsule; Take 1 capsule by mouth 2 (Two) Times a Day.  Dispense: 20 capsule; Refill: 0  -     Discontinue: predniSONE (DELTASONE) 10 MG tablet; Take 1 tablet by mouth 2 (Two) Times a Day.  Dispense: 10 tablet; Refill: 0        Follow Up   No follow-ups on file.  Patient was given instructions and counseling regarding her condition or for health maintenance advice. Please see specific information pulled into the AVS if appropriate.

## 2022-02-16 DIAGNOSIS — J01.00 ACUTE NON-RECURRENT MAXILLARY SINUSITIS: ICD-10-CM

## 2022-02-16 RX ORDER — PREDNISONE 10 MG/1
TABLET ORAL
Qty: 10 TABLET | Refills: 0 | OUTPATIENT
Start: 2022-02-16

## 2022-02-22 ENCOUNTER — HOSPITAL ENCOUNTER (OUTPATIENT)
Dept: PET IMAGING | Facility: HOSPITAL | Age: 82
Discharge: HOME | End: 2022-02-22
Admitting: NURSE PRACTITIONER

## 2022-02-22 DIAGNOSIS — R91.1 RIGHT MIDDLE LOBE PULMONARY NODULE: ICD-10-CM

## 2022-02-22 DIAGNOSIS — Z87.891 FORMER SMOKER: ICD-10-CM

## 2022-02-22 DIAGNOSIS — C34.2 PRIMARY CANCER OF RIGHT MIDDLE LOBE OF LUNG: ICD-10-CM

## 2022-02-22 PROCEDURE — 71250 CT THORAX DX C-: CPT

## 2022-03-01 ENCOUNTER — OFFICE VISIT (OUTPATIENT)
Dept: SURGERY | Facility: CLINIC | Age: 82
End: 2022-03-01

## 2022-03-01 VITALS
WEIGHT: 253 LBS | HEART RATE: 77 BPM | SYSTOLIC BLOOD PRESSURE: 150 MMHG | DIASTOLIC BLOOD PRESSURE: 96 MMHG | TEMPERATURE: 98.4 F | BODY MASS INDEX: 42.15 KG/M2 | OXYGEN SATURATION: 97 % | HEIGHT: 65 IN

## 2022-03-01 DIAGNOSIS — C34.2 PRIMARY CANCER OF RIGHT MIDDLE LOBE OF LUNG: Primary | ICD-10-CM

## 2022-03-01 DIAGNOSIS — Z87.891 FORMER SMOKER: ICD-10-CM

## 2022-03-01 PROCEDURE — 99213 OFFICE O/P EST LOW 20 MIN: CPT | Performed by: NURSE PRACTITIONER

## 2022-03-01 NOTE — PROGRESS NOTES
"Chief Complaint  Follow up, NSCLC    Subjective          Leann E Fiif presents to [facility] in follow up    History of Present Illness    Ms. Calix is a pleasant 81-year-old lady who presents today in follow-up status post right middle lobectomy in July 2020 for a stage I non-small cell lung cancer.  She continues to do well and is able to complete her daily activities.  She is a former smoker with a 30-pack-year history.  She presents today with recent CT chest for surveillance.  She is recovering from recent sinusitis infection.  She has no other complaints.      Objective   Vital Signs:   /96 (BP Location: Left arm, Patient Position: Sitting, Cuff Size: Large Adult)   Pulse 77   Temp 98.4 °F (36.9 °C) (Infrared)   Ht 163.8 cm (64.5\")   Wt 115 kg (253 lb)   SpO2 97%   BMI 42.76 kg/m²     Physical Exam  Constitutional:       Appearance: Normal appearance.   HENT:      Head: Normocephalic and atraumatic.   Cardiovascular:      Rate and Rhythm: Normal rate and regular rhythm.   Pulmonary:      Effort: Pulmonary effort is normal. No respiratory distress.   Musculoskeletal:      Cervical back: Normal range of motion and neck supple.   Skin:     General: Skin is warm.   Neurological:      General: No focal deficit present.      Mental Status: She is alert.   Psychiatric:         Mood and Affect: Mood normal.         Behavior: Behavior normal.         Thought Content: Thought content normal.        Result Review :        `          I have independently reviewed the CT of the chest performed on 2/22/2022 which demonstrates postoperative changes consistent with prior right middle lobectomy.  There is a stable 2 mm right apical pleural-based density.  Calcified nodules in the left upper lobe are unchanged from previous imaging.  Stable left apical lipoma as well as calcified densities measuring up to 11 mm without change.  No new or enlarging pulmonary nodules are seen.  No " pleural or pericardial effusion.  No mediastinal lymphadenopathy.      Assessment and Plan    Diagnoses and all orders for this visit:    1. Primary cancer of right middle lobe of lung (HCC) (Primary)  -     CT Chest Without Contrast; Future    2. Former smoker  -     CT Chest Without Contrast; Future       Ms. Calix is status post right middle lobectomy by Dr. Fagan in June 2020 for a stage I T1b N0 adenocarcinoma.  She continues to do well and her most recent CT of the chest does not demonstrate any evidence of new or recurrent disease.  We will continue with her surveillance with a CT of the chest 6 months from now followed by an office visit to review the findings.  Patient agrees to the plan of care.    I spent 27 minutes caring for June on this date of service. This time includes time spent by me in the following activities:preparing for the visit, reviewing tests, ordering medications, tests, or procedures, documenting information in the medical record and independently interpreting results and communicating that information with the patient/family/caregiver     Follow Up   Return in about 6 months (around 9/1/2022) for Next scheduled follow up.  Patient was given instructions and counseling regarding her condition or for health maintenance advice. Please see specific information pulled into the AVS if appropriate.

## 2022-03-02 ENCOUNTER — TELEPHONE (OUTPATIENT)
Dept: FAMILY MEDICINE CLINIC | Facility: CLINIC | Age: 82
End: 2022-03-02

## 2022-03-02 NOTE — TELEPHONE ENCOUNTER
Caller: Leann Calix    Relationship: Self    Best call back number: [phone]    What is the best time to reach you: ANYTIME    What was the call regarding: PATIENT STATES SHE HAD LABS DONE FOR DR. NUNEZ ON 01/22/2022 AND HER INSURANCE IS NOT WANTING TO COVER IT DUE TO HER LABS NOT BEING NECESSARY.     PATIENT IS WANTING TO KNOW WHAT SHE CAN DO TO GET THAT LAB COVERED.     PLEASE CALL PATIENT BACK.

## 2022-03-07 ENCOUNTER — PATIENT ROUNDING (BHMG ONLY) (OUTPATIENT)
Dept: SURGERY | Facility: CLINIC | Age: 82
End: 2022-03-07

## 2022-05-24 ENCOUNTER — OFFICE VISIT (OUTPATIENT)
Dept: FAMILY MEDICINE CLINIC | Facility: CLINIC | Age: 82
End: 2022-05-24

## 2022-05-24 VITALS
TEMPERATURE: 97.5 F | WEIGHT: 262 LBS | BODY MASS INDEX: 44.28 KG/M2 | OXYGEN SATURATION: 98 % | HEART RATE: 81 BPM | DIASTOLIC BLOOD PRESSURE: 88 MMHG | SYSTOLIC BLOOD PRESSURE: 197 MMHG

## 2022-05-24 DIAGNOSIS — I10 ESSENTIAL HYPERTENSION: Chronic | ICD-10-CM

## 2022-05-24 DIAGNOSIS — N39.41 URGE INCONTINENCE: Primary | ICD-10-CM

## 2022-05-24 DIAGNOSIS — F41.9 ANXIETY: ICD-10-CM

## 2022-05-24 DIAGNOSIS — F51.01 PRIMARY INSOMNIA: ICD-10-CM

## 2022-05-24 PROCEDURE — 99214 OFFICE O/P EST MOD 30 MIN: CPT | Performed by: FAMILY MEDICINE

## 2022-05-24 RX ORDER — ESCITALOPRAM OXALATE 10 MG/1
10 TABLET ORAL DAILY
Qty: 90 TABLET | Refills: 1 | Status: SHIPPED | OUTPATIENT
Start: 2022-05-24 | End: 2022-07-06

## 2022-05-24 RX ORDER — OXYBUTYNIN CHLORIDE 10 MG/1
10 TABLET, EXTENDED RELEASE ORAL DAILY
Qty: 90 TABLET | Refills: 1 | Status: SHIPPED | OUTPATIENT
Start: 2022-05-24 | End: 2022-09-06

## 2022-05-24 RX ORDER — TRAZODONE HYDROCHLORIDE 100 MG/1
100 TABLET ORAL NIGHTLY
Qty: 30 TABLET | Refills: 0 | Status: SHIPPED | OUTPATIENT
Start: 2022-05-24 | End: 2022-07-06

## 2022-05-24 NOTE — PROGRESS NOTES
"Answers for HPI/ROS submitted by the patient on 5/21/2022  Please describe your symptoms.: Insomnia. Unable to go to sleep.  Have you had these symptoms before?: Yes  How long have you been having these symptoms?: Greater than 2 weeks  What is the primary reason for your visit?: Other    Chief Complaint  Follow-up (Anxiety, insomnia, having cramps in both ankles and feet, on going for months )    Subjective          Leann Calix presents to [facility]  She stopped taking Temazepam because she did not think it was helping.  Muscle aches in her feet and ankles.  Just \"feels miserable\".  Overactive bladder.  She tried Myrbetriq but it caused swelling of her legs and cost $600.  She is willing to try a different medicine to help with her bladder if it did not cost as much.      Insomnia  This is a chronic problem. The current episode started more than 1 year ago. Associated symptoms include fatigue and nausea. Pertinent negatives include no chest pain, chills, congestion, coughing, fever or swollen glands. The symptoms are aggravated by stress.   Anxiety  Presents for follow-up visit. Symptoms include decreased concentration, depressed mood, excessive worry, insomnia, muscle tension, nausea and nervous/anxious behavior. Patient reports no chest pain or suicidal ideas. The quality of sleep is poor. Nighttime awakenings: several.       Hypertension  This is a chronic problem. The current episode started more than 1 year ago. The problem has been rapidly worsening since onset. The problem is uncontrolled. Associated symptoms include anxiety. Pertinent negatives include no chest pain.       Objective   Vital Signs:  BP (!) 197/88 (BP Location: Left arm, Patient Position: Sitting, Cuff Size: Adult)   Pulse 81   Temp 97.5 °F (36.4 °C) (Infrared)   Wt 119 kg (262 lb)   SpO2 98%   BMI 44.28 kg/m²         Physical Exam  Vitals and nursing note reviewed.   Constitutional:       General: She " is not in acute distress.     Appearance: She is well-developed.   HENT:      Head: Normocephalic.   Eyes:      General: Lids are normal.      Conjunctiva/sclera: Conjunctivae normal.   Neck:      Thyroid: No thyroid mass or thyromegaly.      Trachea: Trachea normal.   Cardiovascular:      Rate and Rhythm: Normal rate and regular rhythm.      Heart sounds: Normal heart sounds.   Pulmonary:      Effort: Pulmonary effort is normal.      Breath sounds: Normal breath sounds.   Musculoskeletal:      Cervical back: Normal range of motion.   Lymphadenopathy:      Cervical: No cervical adenopathy.   Skin:     General: Skin is warm and dry.   Neurological:      Mental Status: She is alert and oriented to person, place, and time.   Psychiatric:         Attention and Perception: She is attentive.         Mood and Affect: Mood normal. Affect is labile.         Speech: Speech normal.         Behavior: Behavior normal.        Result Review :   The following data was reviewed by: Ryann Simon MD on 05/24/2022:  Common labs    Common Labsle 12/14/21 12/14/21    1320 1320   Total Cholesterol 199    Triglycerides 109    HDL Cholesterol 71    LDL Cholesterol  109 (A)    Hemoglobin A1C  5.7 (A)   (A) Abnormal value       Comments are available for some flowsheets but are not being displayed.                     Assessment and Plan {CC Problem List  Visit Diagnosis   ROS  Review (Popup)  Health Maintenance  Quality  BestPractice  Medications  SmartSets  SnapShot Encounters  Media :23}   Diagnoses and all orders for this visit:    1. Urge incontinence (Primary)  Assessment & Plan:  Try different medicine for OAB as prescribed.       Orders:  -     oxybutynin XL (Ditropan XL) 10 MG 24 hr tablet; Take 1 tablet by mouth Daily.  Dispense: 90 tablet; Refill: 1    2. Primary insomnia  -     traZODone (DESYREL) 100 MG tablet; Take 1 tablet by mouth Every Night.  Dispense: 30 tablet; Refill: 0    3. Essential  hypertension  Assessment & Plan:  Hypertension is worsening.  Continue current treatment regimen.  Dietary sodium restriction.  Weight loss.  Regular aerobic exercise.  Blood pressure will be reassessed at the next regular appointment.      4. Anxiety  Assessment & Plan:  Begin Lexapro as prescribed.  Suggested counseling.     Orders:  -     escitalopram (Lexapro) 10 MG tablet; Take 1 tablet by mouth Daily.  Dispense: 90 tablet; Refill: 1           Follow Up   Return in about 4 weeks (around 6/21/2022).  Patient was given instructions and counseling regarding her condition or for health maintenance advice. Please see specific information pulled into the AVS if appropriate.

## 2022-05-25 PROBLEM — F41.9 ANXIETY: Status: ACTIVE | Noted: 2022-05-25

## 2022-06-09 RX ORDER — DILTIAZEM HYDROCHLORIDE 180 MG/1
180 CAPSULE, COATED, EXTENDED RELEASE ORAL DAILY
Qty: 90 CAPSULE | Refills: 2 | Status: SHIPPED | OUTPATIENT
Start: 2022-06-09 | End: 2022-11-29

## 2022-06-09 RX ORDER — LISINOPRIL AND HYDROCHLOROTHIAZIDE 20; 12.5 MG/1; MG/1
1 TABLET ORAL DAILY
Qty: 90 TABLET | Refills: 3 | Status: SHIPPED | OUTPATIENT
Start: 2022-06-09 | End: 2023-02-24

## 2022-06-09 NOTE — TELEPHONE ENCOUNTER
Rx Refill Note  Requested Prescriptions     Pending Prescriptions Disp Refills   • dilTIAZem CD (CARDIZEM CD) 180 MG 24 hr capsule 30 capsule 1     Sig: Take 1 capsule by mouth Daily.   • lisinopril-hydrochlorothiazide (PRINZIDE,ZESTORETIC) 20-12.5 MG per tablet 90 tablet 3     Sig: Take 1 tablet by mouth Daily.      Last office visit with prescribing clinician: 10/28/2021      Next office visit with prescribing clinician: 10/31/2022            Janet Harrington MA  06/09/22, 10:23 EDT

## 2022-07-06 ENCOUNTER — OFFICE VISIT (OUTPATIENT)
Dept: FAMILY MEDICINE CLINIC | Facility: CLINIC | Age: 82
End: 2022-07-06

## 2022-07-06 VITALS
SYSTOLIC BLOOD PRESSURE: 160 MMHG | TEMPERATURE: 98.2 F | HEART RATE: 83 BPM | OXYGEN SATURATION: 92 % | WEIGHT: 269 LBS | DIASTOLIC BLOOD PRESSURE: 84 MMHG | BODY MASS INDEX: 45.46 KG/M2

## 2022-07-06 DIAGNOSIS — F51.01 PRIMARY INSOMNIA: Primary | ICD-10-CM

## 2022-07-06 DIAGNOSIS — F33.41 RECURRENT MAJOR DEPRESSIVE DISORDER, IN PARTIAL REMISSION: ICD-10-CM

## 2022-07-06 PROCEDURE — 99214 OFFICE O/P EST MOD 30 MIN: CPT | Performed by: FAMILY MEDICINE

## 2022-07-06 RX ORDER — ESCITALOPRAM OXALATE 20 MG/1
20 TABLET ORAL DAILY
Start: 2022-07-06 | End: 2022-09-06

## 2022-07-06 NOTE — ASSESSMENT & PLAN NOTE
Patient's depression is recurrent and is moderate without psychosis. Their depression is currently in partial remission and the condition is improving with treatment. This will be reassessed at the next regular appointment. F/U as described:patient was prescribed an antidepressant medicine Increase dose of Lexapro from 10 mg to 20 mg. .

## 2022-07-06 NOTE — PROGRESS NOTES
"Answers for HPI/ROS submitted by the patient on 6/29/2022  Please describe your symptoms.: Overactive bladder.  Have you had these symptoms before?: Yes  How long have you been having these symptoms?: Greater than 2 weeks  Please list any medications you are currently taking for this condition.: Oxybutynin  What is the primary reason for your visit?: Other    Chief Complaint  Follow-up (6 week f/u)    Yesenia Calix presents to [facility]  Insomnia  This is a chronic problem. The current episode started more than 1 year ago. The problem occurs constantly. The problem has been waxing and waning.   Depression  Visit Type: follow-up  Patient presents with the following symptoms: anhedonia, depressed mood, insomnia, psychomotor retardation and weight gain.  Patient is not experiencing: suicidal ideas.  Frequency of symptoms: constantly   Severity: moderate   Sleep quality: poor          Objective   Vital Signs:  /84 (BP Location: Left arm, Patient Position: Sitting, Cuff Size: Adult)   Pulse 83   Temp 98.2 °F (36.8 °C) (Infrared)   Wt 122 kg (269 lb)   SpO2 92%   BMI 45.46 kg/m²   Estimated body mass index is 45.46 kg/m² as calculated from the following:    Height as of 3/1/22: 163.8 cm (64.5\").    Weight as of this encounter: 122 kg (269 lb).          Physical Exam  Vitals and nursing note reviewed.   Constitutional:       General: She is not in acute distress.     Appearance: She is well-developed.   HENT:      Head: Normocephalic.   Eyes:      General: Lids are normal.   Neck:      Thyroid: No thyroid mass or thyromegaly.      Trachea: Trachea normal.   Cardiovascular:      Rate and Rhythm: Normal rate and regular rhythm.      Heart sounds: Normal heart sounds.   Pulmonary:      Effort: Pulmonary effort is normal.      Breath sounds: Normal breath sounds.   Musculoskeletal:      Cervical back: Normal range of motion.   Lymphadenopathy:      Cervical: No " cervical adenopathy.   Skin:     General: Skin is warm and dry.   Neurological:      Mental Status: She is alert and oriented to person, place, and time.   Psychiatric:         Attention and Perception: She is attentive.         Mood and Affect: Mood normal.         Speech: Speech normal.         Behavior: Behavior normal.        Result Review :  The following data was reviewed by: Ryann Simon MD on 07/06/2022:  Common labs    Common Labsle 12/14/21 12/14/21    1320 1320   Total Cholesterol 199    Triglycerides 109    HDL Cholesterol 71    LDL Cholesterol  109 (A)    Hemoglobin A1C  5.7 (A)   (A) Abnormal value       Comments are available for some flowsheets but are not being displayed.                     Assessment and Plan   Diagnoses and all orders for this visit:    1. Primary insomnia (Primary)  Assessment & Plan:  No relief with Trazodone.  Also had side effect of dry, congested nose.      2. Recurrent major depressive disorder, in partial remission (HCC)  Assessment & Plan:  Patient's depression is recurrent and is moderate without psychosis. Their depression is currently in partial remission and the condition is improving with treatment. This will be reassessed at the next regular appointment. F/U as described:patient was prescribed an antidepressant medicine Increase dose of Lexapro from 10 mg to 20 mg. .    Orders:  -     escitalopram (Lexapro) 20 MG tablet; Take 1 tablet by mouth Daily.           Follow Up   No follow-ups on file.  Patient was given instructions and counseling regarding her condition or for health maintenance advice. Please see specific information pulled into the AVS if appropriate.

## 2022-08-08 ENCOUNTER — PATIENT MESSAGE (OUTPATIENT)
Dept: FAMILY MEDICINE CLINIC | Facility: CLINIC | Age: 82
End: 2022-08-08

## 2022-08-08 DIAGNOSIS — F51.01 PRIMARY INSOMNIA: Primary | ICD-10-CM

## 2022-08-08 RX ORDER — SUVOREXANT 10 MG/1
10 TABLET, FILM COATED ORAL NIGHTLY
Qty: 30 TABLET | Refills: 0 | Status: SHIPPED | OUTPATIENT
Start: 2022-08-08 | End: 2022-08-10 | Stop reason: RX

## 2022-08-10 ENCOUNTER — PATIENT MESSAGE (OUTPATIENT)
Dept: FAMILY MEDICINE CLINIC | Facility: CLINIC | Age: 82
End: 2022-08-10

## 2022-08-10 DIAGNOSIS — F51.01 PRIMARY INSOMNIA: Primary | ICD-10-CM

## 2022-08-10 RX ORDER — ESZOPICLONE 2 MG/1
2 TABLET, FILM COATED ORAL NIGHTLY
Qty: 30 TABLET | Refills: 0 | Status: SHIPPED | OUTPATIENT
Start: 2022-08-10 | End: 2022-09-06

## 2022-08-30 ENCOUNTER — HOSPITAL ENCOUNTER (OUTPATIENT)
Dept: PET IMAGING | Facility: HOSPITAL | Age: 82
Discharge: HOME | End: 2022-08-30
Admitting: NURSE PRACTITIONER

## 2022-08-30 DIAGNOSIS — C34.2 PRIMARY CANCER OF RIGHT MIDDLE LOBE OF LUNG: ICD-10-CM

## 2022-08-30 DIAGNOSIS — Z87.891 FORMER SMOKER: ICD-10-CM

## 2022-08-30 PROCEDURE — 71250 CT THORAX DX C-: CPT

## 2022-09-06 ENCOUNTER — OFFICE VISIT (OUTPATIENT)
Dept: SURGERY | Facility: CLINIC | Age: 82
End: 2022-09-06

## 2022-09-06 VITALS
SYSTOLIC BLOOD PRESSURE: 172 MMHG | DIASTOLIC BLOOD PRESSURE: 91 MMHG | TEMPERATURE: 98.2 F | OXYGEN SATURATION: 96 % | HEART RATE: 91 BPM | HEIGHT: 65 IN | WEIGHT: 267 LBS | BODY MASS INDEX: 44.48 KG/M2

## 2022-09-06 DIAGNOSIS — D17.79 LIPOMA OF LUNG: ICD-10-CM

## 2022-09-06 DIAGNOSIS — Z87.891 FORMER SMOKER: ICD-10-CM

## 2022-09-06 DIAGNOSIS — C34.2 PRIMARY CANCER OF RIGHT MIDDLE LOBE OF LUNG: Primary | ICD-10-CM

## 2022-09-06 PROCEDURE — 99213 OFFICE O/P EST LOW 20 MIN: CPT | Performed by: NURSE PRACTITIONER

## 2022-09-06 RX ORDER — TRIAMCINOLONE ACETONIDE 55 UG/1
2 SPRAY, METERED NASAL DAILY
COMMUNITY
Start: 2022-08-09

## 2022-10-04 DIAGNOSIS — F51.01 PRIMARY INSOMNIA: ICD-10-CM

## 2022-10-04 RX ORDER — TRAZODONE HYDROCHLORIDE 100 MG/1
100 TABLET ORAL NIGHTLY
Qty: 30 TABLET | Refills: 0 | OUTPATIENT
Start: 2022-10-04

## 2022-10-31 ENCOUNTER — OFFICE VISIT (OUTPATIENT)
Dept: CARDIOLOGY | Facility: CLINIC | Age: 82
End: 2022-10-31

## 2022-10-31 VITALS
WEIGHT: 270 LBS | SYSTOLIC BLOOD PRESSURE: 142 MMHG | OXYGEN SATURATION: 97 % | BODY MASS INDEX: 44.98 KG/M2 | HEIGHT: 65 IN | HEART RATE: 86 BPM | DIASTOLIC BLOOD PRESSURE: 76 MMHG

## 2022-10-31 DIAGNOSIS — I25.10 CORONARY ARTERY DISEASE INVOLVING NATIVE CORONARY ARTERY OF NATIVE HEART WITHOUT ANGINA PECTORIS: ICD-10-CM

## 2022-10-31 DIAGNOSIS — I10 ESSENTIAL HYPERTENSION: Primary | ICD-10-CM

## 2022-10-31 DIAGNOSIS — G47.33 OBSTRUCTIVE SLEEP APNEA: ICD-10-CM

## 2022-10-31 PROCEDURE — 99213 OFFICE O/P EST LOW 20 MIN: CPT | Performed by: INTERNAL MEDICINE

## 2022-10-31 NOTE — PROGRESS NOTES
Subjective:     Encounter Date:10/31/2022      Patient ID: Leann Calix is a 82 y.o. female.    Chief Complaint:  History of Present Illness 82-year-old white female with history of coronary disease after sleep apnea and hypertension presents to my office for a follow-up.  Patient is currently stable without any symptoms of chest pain but has some shortness of exertion no complaints of any PND orthopnea.  No palpitation dizziness syncope.  She has some swelling of the feet.  She is taking her meds regularly she is using a CPAP machine but has not had it tested.  She has gained quite a bit of weight.  She does not exercise very well.  She does not follow a good diet.    The following portions of the patient's history were reviewed and updated as appropriate: allergies, current medications, past family history, past medical history, past social history, past surgical history and problem list.  Past Medical History:   Diagnosis Date   • Adenocarcinoma, lung, right (HCC) 2020   • Allergic 2007    Bisoprolol   • Back pain     LOW RADICULAR PAIN LEFT LEG   • Cataract 2006    Cataract surgery   • Cholelithiasis 1996    Had stones   • Chronic insomnia    • Fatty liver    • Ganglion cyst    • GERD (gastroesophageal reflux disease) 07/08/2021   • H/O degenerative disc disease    • Hiatal hernia    • Hiatal hernia    • Hypertension     DR ONEAL   • OAB (overactive bladder)    • Obesity 1985    I'm now losing weight on HMR program   • Osteoarthritis 2002   • Pneumonia 1945   • Sleep apnea     uses BiPAP   • Spondylolisthesis 1989    Surgery   • Uterine cancer (HCC) 1984     Past Surgical History:   Procedure Laterality Date   • BACK SURGERY  1989/2002/2009    Spondylolisthesis   • BREAST BIOPSY  1971   • CARDIAC CATHETERIZATION  06/2018    NL DR ONEAL   • CHOLECYSTECTOMY  1996   • COLONOSCOPY  2016    Clean colon found.   • EYE SURGERY Bilateral 2006    Fausto, laser   • HIP ARTHROPLASTY Right 06/07/2010    DR PACKER  "  • HYSTERECTOMY  1984    CARCINOMA IN SITU   • JOINT REPLACEMENT  2007    First knee replacement.   • LAMINECTOMY  1989    During first back surgery.   • LUMBAR DISCECTOMY  2006    DR VOGEL   • LUMBAR FUSION  2002    L3/5 DR PAPPAS   • LUMBAR LAMINECTOMY  1989    LUMBAR SPINAL FUSION L4/L5 DR BRUCE   • OOPHORECTOMY Right 1996   • PAROTIDECTOMY Right 2004   • PARTIAL HIP ARTHROPLASTY  03/16/2017    Dr Michel   • REVISION TOTAL KNEE ARTHROPLASTY Left 03/16/2017    DR MICHEL   • ROTATOR CUFF REPAIR Right 2003   • SHOULDER SURGERY  2003    Right rotator cuff.   • SPINAL FUSION  1989    Spondylolisthesis   • SPINE SURGERY  1989    Spondylolisthesis   • SUBTOTAL HYSTERECTOMY  1996    Right ovary removed.   • THORACOSCOPY Right 07/29/2020    Procedure: RIGHT VIDEO ASSISTED THORACOSCOPY WITH RIGHT MIDDLE LOBE THERAPUTIC WEDGE RESECTION AND RIGHT MIDDLE LOBECTOMY; HILAR LYMPH NODE DISSECTION;  Surgeon: Jaden Fagan MD;  Location: [facility] MAIN OR;  Service: Cardiothoracic;  Laterality: Right;   • THYROID BIOPSY  01/2018    X5   • THYROID SURGERY      nodules removed   • TOTAL KNEE ARTHROPLASTY  2009    DR MICHEL   • TUBAL ABDOMINAL LIGATION Bilateral 1976     /76   Pulse 86   Ht 163.8 cm (64.5\")   Wt 122 kg (270 lb)   SpO2 97%   BMI 45.63 kg/m²   Family History   Problem Relation Age of Onset   • Cancer Mother         Don't know where cancer began.   • Kidney disease Mother         Cancer may have begun in kidney???   • Thyroid disease Sister    • Allergies Sister    • Allergies Brother    • Cancer Daughter    • Other Daughter         EWINGS SARCOMA   • Cancer Other    • Alcohol abuse Maternal Grandfather    • Arthritis Maternal Grandmother    • Cancer Maternal Aunt         Breast cancer       Current Outpatient Medications:   •  dilTIAZem CD (CARDIZEM CD) 180 MG 24 hr capsule, Take 1 capsule by mouth Daily., Disp: 90 capsule, Rfl: 2  •  famotidine (PEPCID) 40 MG tablet, , Disp: , Rfl:   •  " lisinopril-hydrochlorothiazide (PRINZIDE,ZESTORETIC) 20-12.5 MG per tablet, Take 1 tablet by mouth Daily., Disp: 90 tablet, Rfl: 3  •  Multiple Vitamins-Minerals (MULTIVITAMIN ADULTS PO), Take 1 tablet by mouth Daily., Disp: , Rfl:   •  Nasacort Allergy 24HR 55 MCG/ACT nasal inhaler, 2 sprays Daily., Disp: , Rfl:   Allergies   Allergen Reactions   • Beta Adrenergic Blockers Other (See Comments)     Couldn't breathe or move   • Bisoprolol Unknown - High Severity   • Molds & Smuts Unknown - High Severity     Social History     Socioeconomic History   • Marital status:    Tobacco Use   • Smoking status: Former     Packs/day: 1.00     Years: 30.00     Pack years: 30.00     Types: Cigarettes     Start date: 1976     Quit date: 2006     Years since quittin.3   • Smokeless tobacco: Never   Substance and Sexual Activity   • Alcohol use: No   • Drug use: No   • Sexual activity: Not Currently     Partners: Male     Birth control/protection: Surgical     Comment: Tubes tied in      Review of Systems   Constitutional: Positive for malaise/fatigue.   Cardiovascular: Positive for leg swelling. Negative for chest pain, dyspnea on exertion and palpitations.   Respiratory: Positive for shortness of breath. Negative for cough.    Gastrointestinal: Negative for abdominal pain, nausea and vomiting.   Neurological: Negative for dizziness, focal weakness, headaches, light-headedness and numbness.   All other systems reviewed and are negative.             Objective:     Constitutional:       Appearance: Well-developed.   Eyes:      General: No scleral icterus.     Conjunctiva/sclera: Conjunctivae normal.   HENT:      Head: Normocephalic and atraumatic.   Neck:      Vascular: No carotid bruit or JVD.   Pulmonary:      Effort: Pulmonary effort is normal.      Breath sounds: Normal breath sounds. No wheezing. No rales.   Cardiovascular:      Normal rate. Regular rhythm.   Pulses:     Intact distal pulses.    Abdominal:      General: Bowel sounds are normal.      Palpations: Abdomen is soft.   Musculoskeletal:      Cervical back: Normal range of motion and neck supple. Skin:     General: Skin is warm and dry.      Findings: No rash.   Neurological:      Mental Status: Alert.       Procedures    Lab Review:         MDM  1.  Coronary disease  Patient has nonobstructive disease and is currently stable on medications with normal function  2.  Hypertension  Patient blood pressure currently stable on lisinopril and diltiazem  3.  Sleep apnea  Patient has sleep apnea but needs to have the CPAP machine checked.      Patient's previous medical records, labs, and EKG were reviewed and discussed with the patient at today's visit.

## 2022-11-29 RX ORDER — DILTIAZEM HYDROCHLORIDE 180 MG/1
180 CAPSULE, COATED, EXTENDED RELEASE ORAL DAILY
Qty: 90 CAPSULE | Refills: 2 | Status: SHIPPED | OUTPATIENT
Start: 2022-11-29

## 2022-11-29 NOTE — TELEPHONE ENCOUNTER
Rx Refill Note  Requested Prescriptions     Pending Prescriptions Disp Refills   • dilTIAZem CD (CARDIZEM CD) 180 MG 24 hr capsule [Pharmacy Med Name: DILTIAZEM CD 180MG CAPSULES (24 HR)] 90 capsule 2     Sig: TAKE 1 CAPSULE BY MOUTH DAILY      Last office visit with prescribing clinician: 10/31/2022   Last telemedicine visit with prescribing clinician: Visit date not found   Next office visit with prescribing clinician: 10/31/2023                         Would you like a call back once the refill request has been completed: [] Yes [] No    If the office needs to give you a call back, can they leave a voicemail: [] Yes [] No    Gwen Colindres MA  11/29/22, 08:39 EST

## 2022-12-13 ENCOUNTER — OFFICE VISIT (OUTPATIENT)
Dept: FAMILY MEDICINE CLINIC | Facility: CLINIC | Age: 82
End: 2022-12-13
Payer: MEDICARE

## 2022-12-13 ENCOUNTER — LAB (OUTPATIENT)
Dept: FAMILY MEDICINE CLINIC | Facility: CLINIC | Age: 82
End: 2022-12-13

## 2022-12-13 VITALS
HEART RATE: 94 BPM | HEIGHT: 65 IN | TEMPERATURE: 98.6 F | SYSTOLIC BLOOD PRESSURE: 172 MMHG | OXYGEN SATURATION: 92 % | BODY MASS INDEX: 44.98 KG/M2 | DIASTOLIC BLOOD PRESSURE: 83 MMHG | WEIGHT: 270 LBS

## 2022-12-13 DIAGNOSIS — F51.01 PRIMARY INSOMNIA: ICD-10-CM

## 2022-12-13 DIAGNOSIS — R06.09 DYSPNEA ON EXERTION: ICD-10-CM

## 2022-12-13 DIAGNOSIS — I10 ESSENTIAL HYPERTENSION: Chronic | ICD-10-CM

## 2022-12-13 DIAGNOSIS — R79.89 ABNORMAL TSH: ICD-10-CM

## 2022-12-13 DIAGNOSIS — E55.9 VITAMIN D DEFICIENCY: ICD-10-CM

## 2022-12-13 DIAGNOSIS — R73.9 HYPERGLYCEMIA: ICD-10-CM

## 2022-12-13 DIAGNOSIS — E04.1 THYROID NODULE: ICD-10-CM

## 2022-12-13 DIAGNOSIS — R53.83 FATIGUE, UNSPECIFIED TYPE: ICD-10-CM

## 2022-12-13 DIAGNOSIS — D64.9 ANEMIA, UNSPECIFIED TYPE: ICD-10-CM

## 2022-12-13 DIAGNOSIS — Z00.00 MEDICARE ANNUAL WELLNESS VISIT, SUBSEQUENT: Primary | ICD-10-CM

## 2022-12-13 LAB
ALBUMIN SERPL-MCNC: 4.3 G/DL (ref 3.5–5.2)
ALBUMIN/GLOB SERPL: 1.3 G/DL
ALP SERPL-CCNC: 74 U/L (ref 39–117)
ALT SERPL W P-5'-P-CCNC: 19 U/L (ref 1–33)
ANION GAP SERPL CALCULATED.3IONS-SCNC: 12 MMOL/L (ref 5–15)
AST SERPL-CCNC: 16 U/L (ref 1–32)
BASOPHILS # BLD MANUAL: 0.21 10*3/MM3 (ref 0–0.2)
BASOPHILS NFR BLD MANUAL: 2 % (ref 0–1.5)
BILIRUB SERPL-MCNC: 0.3 MG/DL (ref 0–1.2)
BUN SERPL-MCNC: 19 MG/DL (ref 8–23)
BUN/CREAT SERPL: 21.3 (ref 7–25)
CALCIUM SPEC-SCNC: 9.3 MG/DL (ref 8.6–10.5)
CHLORIDE SERPL-SCNC: 102 MMOL/L (ref 98–107)
CO2 SERPL-SCNC: 25 MMOL/L (ref 22–29)
CREAT SERPL-MCNC: 0.89 MG/DL (ref 0.57–1)
DEPRECATED RDW RBC AUTO: 47.3 FL (ref 37–54)
EGFRCR SERPLBLD CKD-EPI 2021: 65.2 ML/MIN/1.73
ERYTHROCYTE [DISTWIDTH] IN BLOOD BY AUTOMATED COUNT: 14.6 % (ref 12.3–15.4)
GLOBULIN UR ELPH-MCNC: 3.2 GM/DL
GLUCOSE SERPL-MCNC: 103 MG/DL (ref 65–99)
HBA1C MFR BLD: 5.6 % (ref 3.5–5.6)
HCT VFR BLD AUTO: 42.8 % (ref 34–46.6)
HGB BLD-MCNC: 13.7 G/DL (ref 12–15.9)
IRON 24H UR-MRATE: 55 MCG/DL (ref 37–145)
IRON SATN MFR SERPL: 14 % (ref 20–50)
LYMPHOCYTES # BLD MANUAL: 2.58 10*3/MM3 (ref 0.7–3.1)
LYMPHOCYTES NFR BLD MANUAL: 5 % (ref 5–12)
MCH RBC QN AUTO: 30 PG (ref 26.6–33)
MCHC RBC AUTO-ENTMCNC: 32 G/DL (ref 31.5–35.7)
MCV RBC AUTO: 93.8 FL (ref 79–97)
MONOCYTES # BLD: 0.52 10*3/MM3 (ref 0.1–0.9)
NEUTROPHILS # BLD AUTO: 7 10*3/MM3 (ref 1.7–7)
NEUTROPHILS NFR BLD MANUAL: 59 % (ref 42.7–76)
NEUTS BAND NFR BLD MANUAL: 9 % (ref 0–5)
PLAT MORPH BLD: NORMAL
PLATELET # BLD AUTO: 365 10*3/MM3 (ref 140–450)
PMV BLD AUTO: 8.3 FL (ref 6–12)
POTASSIUM SERPL-SCNC: 4 MMOL/L (ref 3.5–5.2)
PROT SERPL-MCNC: 7.5 G/DL (ref 6–8.5)
RBC # BLD AUTO: 4.56 10*6/MM3 (ref 3.77–5.28)
RBC MORPH BLD: NORMAL
SCAN SLIDE: NORMAL
SODIUM SERPL-SCNC: 139 MMOL/L (ref 136–145)
T4 FREE SERPL-MCNC: 1.04 NG/DL (ref 0.93–1.7)
TIBC SERPL-MCNC: 401 MCG/DL (ref 298–536)
TRANSFERRIN SERPL-MCNC: 269 MG/DL (ref 200–360)
TSH SERPL DL<=0.05 MIU/L-ACNC: 0.3 UIU/ML (ref 0.27–4.2)
VARIANT LYMPHS NFR BLD MANUAL: 21 % (ref 19.6–45.3)
VARIANT LYMPHS NFR BLD MANUAL: 4 % (ref 0–5)
WBC MORPH BLD: NORMAL
WBC NRBC COR # BLD: 10.3 10*3/MM3 (ref 3.4–10.8)

## 2022-12-13 PROCEDURE — 83036 HEMOGLOBIN GLYCOSYLATED A1C: CPT | Performed by: FAMILY MEDICINE

## 2022-12-13 PROCEDURE — 36415 COLL VENOUS BLD VENIPUNCTURE: CPT | Performed by: FAMILY MEDICINE

## 2022-12-13 PROCEDURE — 84443 ASSAY THYROID STIM HORMONE: CPT | Performed by: FAMILY MEDICINE

## 2022-12-13 PROCEDURE — 82306 VITAMIN D 25 HYDROXY: CPT | Performed by: FAMILY MEDICINE

## 2022-12-13 PROCEDURE — 84466 ASSAY OF TRANSFERRIN: CPT | Performed by: FAMILY MEDICINE

## 2022-12-13 PROCEDURE — 1170F FXNL STATUS ASSESSED: CPT | Performed by: FAMILY MEDICINE

## 2022-12-13 PROCEDURE — 99213 OFFICE O/P EST LOW 20 MIN: CPT | Performed by: FAMILY MEDICINE

## 2022-12-13 PROCEDURE — 1159F MED LIST DOCD IN RCRD: CPT | Performed by: FAMILY MEDICINE

## 2022-12-13 PROCEDURE — 83540 ASSAY OF IRON: CPT | Performed by: FAMILY MEDICINE

## 2022-12-13 PROCEDURE — 85007 BL SMEAR W/DIFF WBC COUNT: CPT | Performed by: FAMILY MEDICINE

## 2022-12-13 PROCEDURE — 85025 COMPLETE CBC W/AUTO DIFF WBC: CPT | Performed by: FAMILY MEDICINE

## 2022-12-13 PROCEDURE — 80053 COMPREHEN METABOLIC PANEL: CPT | Performed by: FAMILY MEDICINE

## 2022-12-13 PROCEDURE — 82607 VITAMIN B-12: CPT | Performed by: FAMILY MEDICINE

## 2022-12-13 PROCEDURE — 84439 ASSAY OF FREE THYROXINE: CPT | Performed by: FAMILY MEDICINE

## 2022-12-13 PROCEDURE — G0439 PPPS, SUBSEQ VISIT: HCPCS | Performed by: FAMILY MEDICINE

## 2022-12-13 NOTE — PROGRESS NOTES
The ABCs of the Annual Wellness Visit  Subsequent Medicare Wellness Visit    Subjective    Leann Calix is a 82 y.o. female who presents for a Subsequent Medicare Wellness Visit.    The following portions of the patient's history were reviewed and   updated as appropriate: allergies, current medications, past family history, past medical history, past social history, past surgical history and problem list.    Compared to one year ago, the patient feels her physical   health is the same.    Compared to one year ago, the patient feels her mental   health is the same.    Recent Hospitalizations:  She was not admitted to the hospital during the last year.       Current Medical Providers:  Patient Care Team:  yRann Simon MD as PCP - General  Ryann Simon MD as PCP - Family Medicine  Thuan Hickman MD as Consulting Physician (Cardiology)  Eddie Meeks MD as Consulting Physician (Otolaryngology)  Emi Leary APRN as Nurse Practitioner (Nurse Practitioner)  Jaden Fagan MD as Surgeon (Thoracic Surgery)    Outpatient Medications Prior to Visit   Medication Sig Dispense Refill   • dilTIAZem CD (CARDIZEM CD) 180 MG 24 hr capsule TAKE 1 CAPSULE BY MOUTH DAILY 90 capsule 2   • famotidine (PEPCID) 40 MG tablet      • lisinopril-hydrochlorothiazide (PRINZIDE,ZESTORETIC) 20-12.5 MG per tablet Take 1 tablet by mouth Daily. 90 tablet 3   • Multiple Vitamins-Minerals (MULTIVITAMIN ADULTS PO) Take 1 tablet by mouth Daily.     • Nasacort Allergy 24HR 55 MCG/ACT nasal inhaler 2 sprays Daily.       No facility-administered medications prior to visit.       No opioid medication identified on active medication list. I have reviewed chart for other potential  high risk medication/s and harmful drug interactions in the elderly.          Aspirin is not on active medication list.  Aspirin use is not indicated based on review of current medical condition/s. Risk of harm outweighs potential benefits.   .    Patient Active Problem List   Diagnosis   • Inflammatory polyarthropathy (HCC)   • Essential hypertension   • Status post hip replacement   • Thyroid nodule   • Degeneration of intervertebral disc   • Depression   • Fatty liver   • Insomnia   • Low back pain   • Lumbosacral radiculopathy   • Spinal stenosis of lumbar region   • Obesity (BMI 30-39.9)   • Postmenopausal status   • Tobacco dependence in remission   • Encounter for screening mammogram for malignant neoplasm of breast    • Dry eye syndrome, bilateral   • Fuchs' corneal dystrophy   • Lens replaced by other means   • Ocular hypertension, bilateral   • Open angle with borderline findings, low risk, bilateral   • Pseudophakia of both eyes   • PVD (posterior vitreous detachment), both eyes   • Vitreous degeneration   • Sleep apnea   • Primary cancer of right middle lobe of lung (HCC)   • Medicare annual wellness visit, subsequent   • Right upper lobe pulmonary nodule   • Ganglion cyst of right foot   • Urge incontinence   • Hyperglycemia   • Fatigue   • Hyperlipidemia   • Non-recurrent acute suppurative otitis media of right ear without spontaneous rupture of tympanic membrane   • Anxiety     Advance Care Planning  Advance Directive is on file.  ACP discussion was held with the patient during this visit. Patient has an advance directive in EMR which is still valid.      Objective    Vitals:    12/13/22 1322   BP: 172/83   BP Location: Left arm   Patient Position: Sitting   Cuff Size: Large Adult   Pulse: 94   Temp: 98.6 °F (37 °C)   TempSrc: Infrared   SpO2: 92%   Weight: 122 kg (270 lb)   Height: 163.8 cm (64.5\")     Estimated body mass index is 45.63 kg/m² as calculated from the following:    Height as of this encounter: 163.8 cm (64.5\").    Weight as of this encounter: 122 kg (270 lb).    Class 3 Severe Obesity (BMI >=40). Obesity-related health conditions include the following: hypertension and dyslipidemias. Obesity is unchanged. BMI is is above  average; BMI management plan is completed. We discussed portion control, increasing exercise and joining a fitness center or start home based exercise program.      Does the patient have evidence of cognitive impairment? No    Lab Results   Component Value Date    HGBA1C 5.6 2022        HEALTH RISK ASSESSMENT    Smoking Status:  Social History     Tobacco Use   Smoking Status Former   • Packs/day: 1.00   • Years: 30.00   • Pack years: 30.00   • Types: Cigarettes   • Start date: 1976   • Quit date: 2006   • Years since quittin.5   Smokeless Tobacco Never     Alcohol Consumption:  Social History     Substance and Sexual Activity   Alcohol Use No     Fall Risk Screen:    STEADI Fall Risk Assessment has not been completed.    Depression Screening:  PHQ-2/PHQ-9 Depression Screening 2022   Retired PHQ-9 Total Score -   Retired Total Score -   Little Interest or Pleasure in Doing Things 0-->not at all   Feeling Down, Depressed or Hopeless 0-->not at all   PHQ-9: Brief Depression Severity Measure Score 0       Health Habits and Functional and Cognitive Screening:  Functional & Cognitive Status 2022   Do you have difficulty preparing food and eating? No   Do you have difficulty bathing yourself, getting dressed or grooming yourself? No   Do you have difficulty using the toilet? No   Do you have difficulty moving around from place to place? Yes   Do you have trouble with steps or getting out of a bed or a chair? No   Current Diet Well Balanced Diet   Dental Exam Up to date   Eye Exam Up to date   Exercise (times per week) 0 times per week   Current Exercises Include -   Current Exercise Activities Include -   Do you need help using the phone?  No   Are you deaf or do you have serious difficulty hearing?  No   Do you need help with transportation? No   Do you need help shopping? No   Do you need help preparing meals?  No   Do you need help with housework?  No   Do you need help with laundry? No    Do you need help taking your medications? No   Do you need help managing money? -   Do you ever drive or ride in a car without wearing a seat belt? No   Have you felt unusual stress, anger or loneliness in the last month? No   Who do you live with? Alone   If you need help, do you have trouble finding someone available to you? No   Have you been bothered in the last four weeks by sexual problems? -   Do you have difficulty concentrating, remembering or making decisions? No       Age-appropriate Screening Schedule:  Refer to the list below for future screening recommendations based on patient's age, sex and/or medical conditions. Orders for these recommended tests are listed in the plan section. The patient has been provided with a written plan.    Health Maintenance   Topic Date Due   • TDAP/TD VACCINES (1 - Tdap) Never done   • LIPID PANEL  12/14/2022   • DXA SCAN  02/08/2024   • INFLUENZA VACCINE  Completed   • ZOSTER VACCINE  Completed                CMS Preventative Services Quick Reference  Risk Factors Identified During Encounter  Inactivity/Sedentary: Patient was advised to exercise at least 150 minutes a week per CDC recommendations.  Dental Screening Recommended  Vision Screening Recommended  The above risks/problems have been discussed with the patient.  Pertinent information has been shared with the patient in the After Visit Summary.  An After Visit Summary and PPPS were made available to the patient.    Follow Up:   Next Medicare Wellness visit to be scheduled in 1 year.       Additional E&M Note during same encounter follows:  Patient has multiple medical problems which are significant and separately identifiable that require additional work above and beyond the Medicare Wellness Visit.      Chief Complaint  Medicare Wellness-subsequent (Yearly )    Subjective        Shortness of Breath  This is a new problem. The current episode started more than 1 month ago. The problem occurs daily. The problem  has been gradually worsening. Pertinent negatives include no chest pain, fever, headaches, orthopnea, sore throat, sputum production, syncope or wheezing. The symptoms are aggravated by any activity. The patient has no known risk factors for DVT/PE. She has tried rest for the symptoms. The treatment provided mild relief.   Hypertension  This is a chronic problem. The current episode started more than 1 year ago. The problem has been waxing and waning since onset. The problem is uncontrolled. Associated symptoms include malaise/fatigue and shortness of breath. Pertinent negatives include no chest pain, headaches or orthopnea. There are no associated agents to hypertension. Past treatments include lifestyle changes. Current antihypertension treatment includes calcium channel blockers, diuretics and ACE inhibitors. The current treatment provides mild improvement. Compliance problems include exercise.      Leann Calix is also being seen today for hypertension, insomnia and fatigue.    Review of Systems   Constitutional: Positive for malaise/fatigue. Negative for fever.   HENT: Negative for sore throat.    Respiratory: Positive for shortness of breath. Negative for sputum production and wheezing.    Cardiovascular: Negative for chest pain, orthopnea and syncope.       Objective   Vital Signs:  /83 (BP Location: Left arm, Patient Position: Sitting, Cuff Size: Large Adult)   Pulse 94   Temp 98.6 °F (37 °C) (Infrared)   Ht 163.8 cm (64.5\")   Wt 122 kg (270 lb)   SpO2 92%   BMI 45.63 kg/m²     Physical Exam  Vitals and nursing note reviewed.   Constitutional:       General: She is not in acute distress.     Appearance: She is well-developed.   HENT:      Head: Normocephalic.      Right Ear: Hearing, tympanic membrane and ear canal normal.      Left Ear: Hearing, tympanic membrane and ear canal normal.      Mouth/Throat:      Mouth: Mucous membranes are moist.   Eyes:      General: Lids are normal.       Conjunctiva/sclera: Conjunctivae normal.      Pupils: Pupils are equal, round, and reactive to light.   Neck:      Thyroid: No thyroid mass or thyromegaly.   Cardiovascular:      Rate and Rhythm: Normal rate and regular rhythm.   Pulmonary:      Effort: Pulmonary effort is normal.      Breath sounds: Normal breath sounds.   Abdominal:      Palpations: Abdomen is soft.      Tenderness: There is no abdominal tenderness.   Musculoskeletal:         General: Normal range of motion.      Cervical back: Normal range of motion and neck supple.   Lymphadenopathy:      Cervical: No cervical adenopathy.   Skin:     General: Skin is warm and dry.   Neurological:      Mental Status: She is alert and oriented to person, place, and time.   Psychiatric:         Speech: Speech normal.          The following data was reviewed by: Ryann Simon MD on 12/13/2022:  Common labs    Common Labs 12/13/22 12/13/22 12/13/22    1426 1426 1426   Glucose 103 (A)     BUN 19     Creatinine 0.89     Sodium 139     Potassium 4.0     Chloride 102     Calcium 9.3     Albumin 4.30     Total Bilirubin 0.3     Alkaline Phosphatase 74     AST (SGOT) 16     ALT (SGPT) 19     WBC  10.30    Hemoglobin  13.7    Hematocrit  42.8    Platelets  365    Hemoglobin A1C   5.6   (A) Abnormal value                       Assessment and Plan   Diagnoses and all orders for this visit:    1. Medicare annual wellness visit, subsequent (Primary)    2. Primary insomnia  Assessment & Plan:  She is using otc Unisom prn.      3. Dyspnea on exertion  -     Cancel: TSH  -     CBC & Differential  -     Iron Profile; Future  -     Vitamin D,25-Hydroxy  -     Vitamin B12  -     Manual Differential    4. Essential hypertension  Assessment & Plan:  Hypertension is worsening.  Dietary sodium restriction.  Weight loss.  Regular aerobic exercise.  Continue current medications.  Blood pressure will be reassessed in 3 months.      5. Hyperglycemia  -     Comprehensive Metabolic  Panel  -     Hemoglobin A1c    6. Thyroid nodule  -     Cancel: TSH    7. Fatigue, unspecified type  -     Cancel: TSH  -     CBC & Differential  -     Comprehensive Metabolic Panel  -     Hemoglobin A1c  -     Iron Profile; Future  -     Vitamin D,25-Hydroxy  -     Vitamin B12  -     Manual Differential    8. Vitamin D deficiency  -     Vitamin D,25-Hydroxy    9. Anemia, unspecified type  -     Iron Profile; Future           Follow Up   No follow-ups on file.  Patient was given instructions and counseling regarding her condition or for health maintenance advice. Please see specific information pulled into the AVS if appropriate.

## 2022-12-14 LAB
25(OH)D3 SERPL-MCNC: 41.3 NG/ML (ref 30–100)
VIT B12 BLD-MCNC: 448 PG/ML (ref 211–946)

## 2023-01-02 ENCOUNTER — PATIENT MESSAGE (OUTPATIENT)
Dept: FAMILY MEDICINE CLINIC | Facility: CLINIC | Age: 83
End: 2023-01-02
Payer: MEDICARE

## 2023-01-02 NOTE — ASSESSMENT & PLAN NOTE
Hypertension is worsening.  Dietary sodium restriction.  Weight loss.  Regular aerobic exercise.  Continue current medications.  Blood pressure will be reassessed in 3 months.

## 2023-01-13 RX ORDER — ASPIRIN 81 MG/1
81 TABLET ORAL DAILY
Start: 2023-01-13 | End: 2023-03-07

## 2023-01-31 ENCOUNTER — PATIENT MESSAGE (OUTPATIENT)
Dept: FAMILY MEDICINE CLINIC | Facility: CLINIC | Age: 83
End: 2023-01-31
Payer: MEDICARE

## 2023-01-31 DIAGNOSIS — F51.01 PRIMARY INSOMNIA: Primary | ICD-10-CM

## 2023-01-31 RX ORDER — TEMAZEPAM 30 MG/1
30 CAPSULE ORAL NIGHTLY PRN
Qty: 30 CAPSULE | Refills: 0 | Status: SHIPPED | OUTPATIENT
Start: 2023-01-31 | End: 2023-02-21

## 2023-02-07 DIAGNOSIS — Z12.31 ENCOUNTER FOR SCREENING MAMMOGRAM FOR MALIGNANT NEOPLASM OF BREAST: Primary | ICD-10-CM

## 2023-02-15 ENCOUNTER — OFFICE VISIT (OUTPATIENT)
Dept: FAMILY MEDICINE CLINIC | Facility: CLINIC | Age: 83
End: 2023-02-15
Payer: MEDICARE

## 2023-02-15 ENCOUNTER — LAB (OUTPATIENT)
Dept: FAMILY MEDICINE CLINIC | Facility: CLINIC | Age: 83
End: 2023-02-15
Payer: MEDICARE

## 2023-02-15 DIAGNOSIS — E61.1 IRON DEFICIENCY: ICD-10-CM

## 2023-02-15 DIAGNOSIS — E61.1 IRON DEFICIENCY: Primary | ICD-10-CM

## 2023-02-15 DIAGNOSIS — F33.1 MODERATE EPISODE OF RECURRENT MAJOR DEPRESSIVE DISORDER: ICD-10-CM

## 2023-02-15 DIAGNOSIS — R53.83 FATIGUE, UNSPECIFIED TYPE: ICD-10-CM

## 2023-02-15 DIAGNOSIS — R79.89 ABNORMAL TSH: ICD-10-CM

## 2023-02-15 PROCEDURE — 85025 COMPLETE CBC W/AUTO DIFF WBC: CPT | Performed by: FAMILY MEDICINE

## 2023-02-15 PROCEDURE — 36415 COLL VENOUS BLD VENIPUNCTURE: CPT

## 2023-02-15 PROCEDURE — 84466 ASSAY OF TRANSFERRIN: CPT | Performed by: FAMILY MEDICINE

## 2023-02-15 PROCEDURE — 84443 ASSAY THYROID STIM HORMONE: CPT | Performed by: FAMILY MEDICINE

## 2023-02-15 PROCEDURE — 83540 ASSAY OF IRON: CPT | Performed by: FAMILY MEDICINE

## 2023-02-15 PROCEDURE — 99213 OFFICE O/P EST LOW 20 MIN: CPT | Performed by: FAMILY MEDICINE

## 2023-02-15 RX ORDER — FLUOXETINE HYDROCHLORIDE 20 MG/1
20 CAPSULE ORAL DAILY
Qty: 90 CAPSULE | Refills: 1 | Status: SHIPPED | OUTPATIENT
Start: 2023-02-15

## 2023-02-15 NOTE — PROGRESS NOTES
"Chief Complaint  Fatigue (X 1 year )    Subjective        Leann Calix presents to [facility]  Fatigue  This is a chronic problem. The current episode started more than 1 month ago. The problem occurs constantly. The problem has been gradually worsening. Associated symptoms include fatigue and myalgias. Pertinent negatives include no chest pain, chills, congestion, coughing, diaphoresis, fever or urinary symptoms. Nothing aggravates the symptoms.   Depression  Visit Type: follow-up  Patient presents with the following symptoms: anhedonia, insomnia, psychomotor retardation and weight loss.  Frequency of symptoms: most days   Sleep quality: poor          Objective   Vital Signs:  /81 (BP Location: Left arm, Patient Position: Sitting, Cuff Size: Large Adult)   Pulse 91   Temp 97.8 °F (36.6 °C) (Infrared)   Ht 163.8 cm (64.5\")   Wt 107 kg (236 lb 9.6 oz)   SpO2 95%   BMI 39.99 kg/m²   Estimated body mass index is 39.99 kg/m² as calculated from the following:    Height as of this encounter: 163.8 cm (64.5\").    Weight as of this encounter: 107 kg (236 lb 9.6 oz).        Physical Exam  Vitals and nursing note reviewed.   Constitutional:       General: She is not in acute distress.     Appearance: She is well-developed.   HENT:      Head: Normocephalic.   Eyes:      General: Lids are normal.      Conjunctiva/sclera: Conjunctivae normal.   Neck:      Thyroid: No thyroid mass or thyromegaly.      Trachea: Trachea normal.   Cardiovascular:      Rate and Rhythm: Normal rate and regular rhythm.      Heart sounds: Normal heart sounds.   Pulmonary:      Effort: Pulmonary effort is normal.      Breath sounds: Normal breath sounds.   Musculoskeletal:      Cervical back: Normal range of motion.   Lymphadenopathy:      Cervical: No cervical adenopathy.   Skin:     General: Skin is warm and dry.   Neurological:      Mental Status: She is alert and oriented to person, place, and time. "   Psychiatric:         Attention and Perception: She is attentive.         Mood and Affect: Mood is depressed.         Speech: Speech normal.         Behavior: Behavior normal.        Result Review :  The following data was reviewed by: Ryann Simon MD on 02/15/2023:  Common labs    Common Labs 12/13/22 12/13/22 12/13/22    1426 1426 1426   Glucose 103 (A)     BUN 19     Creatinine 0.89     Sodium 139     Potassium 4.0     Chloride 102     Calcium 9.3     Albumin 4.30     Total Bilirubin 0.3     Alkaline Phosphatase 74     AST (SGOT) 16     ALT (SGPT) 19     WBC  10.30    Hemoglobin  13.7    Hematocrit  42.8    Platelets  365    Hemoglobin A1C   5.6   (A) Abnormal value                         Assessment and Plan   Diagnoses and all orders for this visit:    1. Iron deficiency (Primary)  -     CBC & Differential  -     Iron Profile; Future    2. Abnormal TSH  -     TSH    3. Fatigue, unspecified type  -     CBC & Differential  -     Iron Profile; Future  -     TSH    4. Moderate episode of recurrent major depressive disorder (HCC)  -     FLUoxetine (PROzac) 20 MG capsule; Take 1 capsule by mouth Daily.  Dispense: 90 capsule; Refill: 1             Follow Up   No follow-ups on file.  Patient was given instructions and counseling regarding her condition or for health maintenance advice. Please see specific information pulled into the AVS if appropriate.       Answers for HPI/ROS submitted by the patient on 2/8/2023  Please describe your symptoms.: Weakness, fatigue, insomnia  Have you had these symptoms before?: Yes  How long have you been having these symptoms?: Greater than 2 weeks  Please list any medications you are currently taking for this condition.: Iron tablets  Please describe any probable cause for these symptoms. : I don't know any causes.  What is the primary reason for your visit?: Other

## 2023-02-16 ENCOUNTER — TELEPHONE (OUTPATIENT)
Dept: FAMILY MEDICINE CLINIC | Facility: CLINIC | Age: 83
End: 2023-02-16

## 2023-02-16 VITALS
DIASTOLIC BLOOD PRESSURE: 81 MMHG | TEMPERATURE: 97.8 F | HEART RATE: 91 BPM | OXYGEN SATURATION: 95 % | WEIGHT: 266 LBS | SYSTOLIC BLOOD PRESSURE: 134 MMHG | BODY MASS INDEX: 44.32 KG/M2 | HEIGHT: 65 IN

## 2023-02-16 LAB
BASOPHILS # BLD AUTO: 0.05 10*3/MM3 (ref 0–0.2)
BASOPHILS NFR BLD AUTO: 0.5 % (ref 0–1.5)
DEPRECATED RDW RBC AUTO: 42.1 FL (ref 37–54)
EOSINOPHIL # BLD AUTO: 0.02 10*3/MM3 (ref 0–0.4)
EOSINOPHIL NFR BLD AUTO: 0.2 % (ref 0.3–6.2)
ERYTHROCYTE [DISTWIDTH] IN BLOOD BY AUTOMATED COUNT: 12.6 % (ref 12.3–15.4)
HCT VFR BLD AUTO: 42.5 % (ref 34–46.6)
HGB BLD-MCNC: 14.5 G/DL (ref 12–15.9)
IMM GRANULOCYTES # BLD AUTO: 0.03 10*3/MM3 (ref 0–0.05)
IMM GRANULOCYTES NFR BLD AUTO: 0.3 % (ref 0–0.5)
IRON 24H UR-MRATE: 84 MCG/DL (ref 37–145)
IRON SATN MFR SERPL: 22 % (ref 20–50)
LYMPHOCYTES # BLD AUTO: 1.93 10*3/MM3 (ref 0.7–3.1)
LYMPHOCYTES NFR BLD AUTO: 20.8 % (ref 19.6–45.3)
MCH RBC QN AUTO: 30.9 PG (ref 26.6–33)
MCHC RBC AUTO-ENTMCNC: 34.1 G/DL (ref 31.5–35.7)
MCV RBC AUTO: 90.4 FL (ref 79–97)
MONOCYTES # BLD AUTO: 0.79 10*3/MM3 (ref 0.1–0.9)
MONOCYTES NFR BLD AUTO: 8.5 % (ref 5–12)
NEUTROPHILS NFR BLD AUTO: 6.45 10*3/MM3 (ref 1.7–7)
NEUTROPHILS NFR BLD AUTO: 69.7 % (ref 42.7–76)
NRBC BLD AUTO-RTO: 0 /100 WBC (ref 0–0.2)
PLATELET # BLD AUTO: 298 10*3/MM3 (ref 140–450)
PMV BLD AUTO: 10.6 FL (ref 6–12)
RBC # BLD AUTO: 4.7 10*6/MM3 (ref 3.77–5.28)
TIBC SERPL-MCNC: 389 MCG/DL (ref 298–536)
TRANSFERRIN SERPL-MCNC: 261 MG/DL (ref 200–360)
TSH SERPL DL<=0.05 MIU/L-ACNC: 0.35 UIU/ML (ref 0.27–4.2)
WBC NRBC COR # BLD: 9.27 10*3/MM3 (ref 3.4–10.8)

## 2023-02-16 NOTE — TELEPHONE ENCOUNTER
Caller: Leann Calix    Relationship: Self    Best call back number: [phone]    What is the best time to reach you: ANY    Who are you requesting to speak with (clinical staff, provider,  specific staff member): ELENI    Do you know the name of the person who called: Leann    What was the call regarding: PATIENT WOULD LIKE TO DISCUSS HER OFFICE VISIT YESTERDAY 2/15/23.    Do you require a callback: YES

## 2023-02-21 DIAGNOSIS — F51.01 PRIMARY INSOMNIA: ICD-10-CM

## 2023-02-21 RX ORDER — TEMAZEPAM 30 MG/1
30 CAPSULE ORAL NIGHTLY PRN
Qty: 30 CAPSULE | Refills: 0 | Status: SHIPPED | OUTPATIENT
Start: 2023-02-21 | End: 2023-03-25

## 2023-02-24 RX ORDER — LISINOPRIL AND HYDROCHLOROTHIAZIDE 20; 12.5 MG/1; MG/1
1 TABLET ORAL DAILY
Qty: 90 TABLET | Refills: 3 | Status: SHIPPED | OUTPATIENT
Start: 2023-02-24

## 2023-02-24 NOTE — TELEPHONE ENCOUNTER
Rx Refill Note  Requested Prescriptions     Pending Prescriptions Disp Refills   • lisinopril-hydrochlorothiazide (PRINZIDE,ZESTORETIC) 20-12.5 MG per tablet [Pharmacy Med Name: LISINOPRIL-HCTZ 20/12.5MG TABLETS] 90 tablet 3     Sig: TAKE 1 TABLET BY MOUTH DAILY      Last office visit with prescribing clinician: 10/31/2022   Last telemedicine visit with prescribing clinician: Visit date not found   Next office visit with prescribing clinician: 10/31/2023                         Would you like a call back once the refill request has been completed: [] Yes [] No    If the office needs to give you a call back, can they leave a voicemail: [] Yes [] No    Yanira Mcduffie MA  02/24/23, 08:28 EST

## 2023-03-02 ENCOUNTER — HOSPITAL ENCOUNTER (OUTPATIENT)
Dept: PET IMAGING | Facility: HOSPITAL | Age: 83
Discharge: HOME | End: 2023-03-02
Admitting: NURSE PRACTITIONER
Payer: MEDICARE

## 2023-03-02 DIAGNOSIS — D17.79 LIPOMA OF LUNG: ICD-10-CM

## 2023-03-02 DIAGNOSIS — C34.2 PRIMARY CANCER OF RIGHT MIDDLE LOBE OF LUNG: ICD-10-CM

## 2023-03-02 DIAGNOSIS — Z87.891 FORMER SMOKER: ICD-10-CM

## 2023-03-02 PROCEDURE — 71250 CT THORAX DX C-: CPT

## 2023-03-07 ENCOUNTER — OFFICE VISIT (OUTPATIENT)
Dept: SURGERY | Facility: CLINIC | Age: 83
End: 2023-03-07
Payer: MEDICARE

## 2023-03-07 VITALS
BODY MASS INDEX: 43.15 KG/M2 | HEART RATE: 96 BPM | WEIGHT: 259 LBS | TEMPERATURE: 96.9 F | SYSTOLIC BLOOD PRESSURE: 173 MMHG | OXYGEN SATURATION: 94 % | HEIGHT: 65 IN | DIASTOLIC BLOOD PRESSURE: 95 MMHG

## 2023-03-07 DIAGNOSIS — R91.8 LUNG NODULES: ICD-10-CM

## 2023-03-07 DIAGNOSIS — D17.79 LIPOMA OF LUNG: ICD-10-CM

## 2023-03-07 DIAGNOSIS — Z87.891 FORMER SMOKER: ICD-10-CM

## 2023-03-07 DIAGNOSIS — C34.2 PRIMARY CANCER OF RIGHT MIDDLE LOBE OF LUNG: Primary | ICD-10-CM

## 2023-03-07 PROCEDURE — 99214 OFFICE O/P EST MOD 30 MIN: CPT | Performed by: NURSE PRACTITIONER

## 2023-03-07 NOTE — PROGRESS NOTES
"Chief Complaint  Lung Cancer (6 month follow up CT chest @ [facility])    Subjective        Leann E Fifi presents to [facility]  History of Present Illness     Ms. Calix is a pleasant 82-year-old lady who is status post right middle lobectomy by Dr. Fagan in July 2020 for stage I non-small cell lung cancer.  She has done well postoperatively and continues to undergo surveillance.  She has no new complaints since she was last seen in our office in September 2022.  She has been initiated on Ozempic in January and has lost about 20 pounds over the past 2 months.  She is less short of breath with exertion.  She remains active in her daily life and does not require any supplemental oxygen at baseline.  She presents today feeling well with CT chest.      Objective   Vital Signs:  /95 (BP Location: Left arm, Patient Position: Sitting, Cuff Size: Large Adult)   Pulse 96   Temp 96.9 °F (36.1 °C) (Infrared)   Ht 163.8 cm (64.5\")   Wt 117 kg (259 lb)   SpO2 94%   BMI 43.77 kg/m²   Estimated body mass index is 43.77 kg/m² as calculated from the following:    Height as of this encounter: 163.8 cm (64.5\").    Weight as of this encounter: 117 kg (259 lb).             Physical Exam  Constitutional:       General: She is not in acute distress.     Appearance: Normal appearance. She is not ill-appearing.   HENT:      Head: Normocephalic and atraumatic.   Cardiovascular:      Rate and Rhythm: Normal rate.   Pulmonary:      Effort: Pulmonary effort is normal. No respiratory distress.   Musculoskeletal:         General: Normal range of motion.      Cervical back: Normal range of motion and neck supple.      Comments: Using cane to assist with ambulation     Skin:     General: Skin is warm and dry.   Neurological:      General: No focal deficit present.      Mental Status: She is alert and oriented to person, place, and time.   Psychiatric:         Mood and Affect: Mood normal.         Thought " Content: Thought content normal.        Result Review :          I have independently reviewed the CT of the chest performed on 3/2/2023 which demonstrates postoperative changes consistent with a right middle lobectomy.  There is a stable 3 mm nodule in the right upper lobe and a stable 6 mm nodule in the right upper lobe.  There is a calcified granuloma in the left upper lobe and a stable pleural lipoma in the left apex.  No new or enlarging pulmonary nodules identified.  Pleural or pericardial effusion.             Assessment and Plan   Diagnoses and all orders for this visit:    1. Primary cancer of right middle lobe of lung (HCC) (Primary)  -     CT Chest Without Contrast; Future    2. Former smoker  -     CT Chest Without Contrast; Future    3. Lipoma of lung  -     CT Chest Without Contrast; Future    4. Lung nodules  -     CT Chest Without Contrast; Future      Ms. Calix is status post right middle lobectomy in July 2020 for T1BN0 adenocarcinoma.  Her most recent CT chest is stable without evidence of recurrent disease.  We will continue her surveillance with a CT of the chest in 6 months followed by an office visit to review the findings.  She agrees to the plan of care.         I spent 36 minutes caring for Leann on this date of service. This time includes time spent by me in the following activities:preparing for the visit, reviewing tests, obtaining and/or reviewing a separately obtained history, performing a medically appropriate examination and/or evaluation , counseling and educating the patient/family/caregiver, ordering medications, tests, or procedures, documenting information in the medical record, independently interpreting results and communicating that information with the patient/family/caregiver and care coordination     Follow Up   Return in about 6 months (around 9/7/2023) for Next scheduled follow up.  Patient was given instructions and counseling regarding her condition or for health  maintenance advice. Please see specific information pulled into the AVS if appropriate.

## 2023-03-25 DIAGNOSIS — F51.01 PRIMARY INSOMNIA: ICD-10-CM

## 2023-03-25 RX ORDER — SEMAGLUTIDE 1.34 MG/ML
INJECTION, SOLUTION SUBCUTANEOUS
Qty: 1.5 ML | Refills: 1 | Status: SHIPPED | OUTPATIENT
Start: 2023-03-25

## 2023-03-25 RX ORDER — TEMAZEPAM 30 MG/1
30 CAPSULE ORAL NIGHTLY PRN
Qty: 30 CAPSULE | Refills: 0 | Status: SHIPPED | OUTPATIENT
Start: 2023-03-25

## 2023-04-17 ENCOUNTER — OFFICE VISIT (OUTPATIENT)
Dept: FAMILY MEDICINE CLINIC | Facility: CLINIC | Age: 83
End: 2023-04-17
Payer: MEDICARE

## 2023-04-17 VITALS
TEMPERATURE: 97.8 F | SYSTOLIC BLOOD PRESSURE: 136 MMHG | HEIGHT: 65 IN | BODY MASS INDEX: 42.25 KG/M2 | OXYGEN SATURATION: 95 % | WEIGHT: 253.6 LBS | HEART RATE: 90 BPM | DIASTOLIC BLOOD PRESSURE: 88 MMHG

## 2023-04-17 DIAGNOSIS — E53.8 VITAMIN B12 DEFICIENCY: Primary | ICD-10-CM

## 2023-04-17 DIAGNOSIS — E55.9 VITAMIN D DEFICIENCY: ICD-10-CM

## 2023-04-17 DIAGNOSIS — R53.83 FATIGUE, UNSPECIFIED TYPE: ICD-10-CM

## 2023-04-17 DIAGNOSIS — E05.90 HYPERTHYROIDISM: ICD-10-CM

## 2023-04-17 RX ORDER — CYANOCOBALAMIN 1000 UG/ML
1000 INJECTION, SOLUTION INTRAMUSCULAR; SUBCUTANEOUS
Status: SHIPPED | OUTPATIENT
Start: 2023-04-17

## 2023-04-17 RX ORDER — SEMAGLUTIDE 1.34 MG/ML
0.5 INJECTION, SOLUTION SUBCUTANEOUS WEEKLY
Qty: 1.5 ML | Refills: 1 | Status: SHIPPED | OUTPATIENT
Start: 2023-04-17

## 2023-04-17 RX ADMIN — CYANOCOBALAMIN 1000 MCG: 1000 INJECTION, SOLUTION INTRAMUSCULAR; SUBCUTANEOUS at 13:36

## 2023-04-17 NOTE — PROGRESS NOTES
"Chief Complaint  Follow-up (Go over lab results ) and Fatigue    Subjective        Leann Calix presents to [facility]  History of Present Illness  She recently had some labs done at her rheumatology office which showed a low TSH level.    Fatigue  This is a chronic problem. The current episode started more than 1 month ago. The problem occurs constantly. The problem has been gradually worsening. Associated symptoms include fatigue. Pertinent negatives include no chest pain, chills, congestion, coughing, diaphoresis, fever, nausea or swollen glands. Nothing aggravates the symptoms. She has tried nothing for the symptoms.       Objective   Vital Signs:  /88   Pulse 90   Temp 97.8 °F (36.6 °C) (Infrared)   Ht 163.8 cm (64.5\")   Wt 115 kg (253 lb 9.6 oz)   SpO2 95%   BMI 42.86 kg/m²   Estimated body mass index is 42.86 kg/m² as calculated from the following:    Height as of this encounter: 163.8 cm (64.5\").    Weight as of this encounter: 115 kg (253 lb 9.6 oz).       Class 3 Severe Obesity (BMI >=40). Obesity-related health conditions include the following: hypertension and dyslipidemias. Obesity is improving with treatment. BMI is is above average; BMI management plan is completed. We discussed portion control and increasing exercise.      Physical Exam  Vitals and nursing note reviewed.   Constitutional:       General: She is not in acute distress.     Appearance: She is well-developed.   HENT:      Head: Normocephalic.   Eyes:      General: Lids are normal.      Conjunctiva/sclera: Conjunctivae normal.   Neck:      Thyroid: No thyroid mass or thyromegaly.      Trachea: Trachea normal.   Cardiovascular:      Rate and Rhythm: Normal rate and regular rhythm.      Heart sounds: Normal heart sounds.   Pulmonary:      Effort: Pulmonary effort is normal.      Breath sounds: Normal breath sounds.   Musculoskeletal:      Cervical back: Normal range of motion.   Lymphadenopathy: "      Cervical: No cervical adenopathy.   Skin:     General: Skin is warm and dry.   Neurological:      Mental Status: She is alert and oriented to person, place, and time.   Psychiatric:         Attention and Perception: She is attentive.         Mood and Affect: Mood normal.         Speech: Speech normal.         Behavior: Behavior normal.        Result Review :  The following data was reviewed by: Ryann Simon MD on 04/17/2023:  Common labs        12/13/2022    14:26 2/15/2023    13:06   Common Labs   Glucose 103      BUN 19      Creatinine 0.89      Sodium 139      Potassium 4.0      Chloride 102      Calcium 9.3      Albumin 4.30      Total Bilirubin 0.3      Alkaline Phosphatase 74      AST (SGOT) 16      ALT (SGPT) 19      WBC 10.30   9.27     Hemoglobin 13.7   14.5     Hematocrit 42.8   42.5     Platelets 365   298     Hemoglobin A1C 5.6                     Assessment and Plan   Diagnoses and all orders for this visit:    1. Vitamin B12 deficiency (Primary)  -     cyanocobalamin injection 1,000 mcg    2. Fatigue, unspecified type    3. Vitamin D deficiency    4. Hyperthyroidism  -     Cancel: Ambulatory Referral to Endocrinology  -     Ambulatory Referral to Endocrinology    Other orders  -     Semaglutide,0.25 or 0.5MG/DOS, (Ozempic, 0.25 or 0.5 MG/DOSE,) 2 MG/1.5ML solution pen-injector; Inject 0.5 mg under the skin into the appropriate area as directed 1 (One) Time Per Week.  Dispense: 1.5 mL; Refill: 1             Follow Up   No follow-ups on file.  Patient was given instructions and counseling regarding her condition or for health maintenance advice. Please see specific information pulled into the AVS if appropriate.       Answers for HPI/ROS submitted by the patient on 4/14/2023  Please describe your symptoms.: My blood drawn  by  Rheumatology on 4/13/23 showed low TSH level. I may need medication prescribed for it.  Have you had these symptoms before?: Yes  How long have you been having these  symptoms?: Greater than 2 weeks  Please list any medications you are currently taking for this condition.: None  What is the primary reason for your visit?: Other

## 2023-04-23 DIAGNOSIS — F51.01 PRIMARY INSOMNIA: ICD-10-CM

## 2023-04-24 DIAGNOSIS — F51.01 PRIMARY INSOMNIA: ICD-10-CM

## 2023-04-24 RX ORDER — TEMAZEPAM 30 MG/1
30 CAPSULE ORAL NIGHTLY PRN
Qty: 30 CAPSULE | Refills: 1 | Status: SHIPPED | OUTPATIENT
Start: 2023-04-24

## 2023-04-25 ENCOUNTER — TELEPHONE (OUTPATIENT)
Dept: FAMILY MEDICINE CLINIC | Facility: CLINIC | Age: 83
End: 2023-04-25

## 2023-04-25 RX ORDER — TEMAZEPAM 30 MG/1
30 CAPSULE ORAL NIGHTLY PRN
Qty: 30 CAPSULE | Refills: 1 | OUTPATIENT
Start: 2023-04-25

## 2023-04-25 NOTE — TELEPHONE ENCOUNTER
"    Caller: Fifi June E \"June Lemons\"    Relationship: Self    Best call back number: [phone]    What medication are you requesting: LEVOTHYROXINE      Have you had these symptoms before:    [] Yes  [x] No    Have you been treated for these symptoms before:   [] Yes  [x] No    If a prescription is needed, what is your preferred pharmacy and phone number: [id number] - [place] [facility] [address] AT [facility] OF  & [facility] - [phone] [facility] [phone] FX     Additional notes:    WOULD LIKE TO KNOW IF DOCTOR MAYRA WOULD PRESCRIBE THIS MEDICATION UNTIL SHE CAN GET AN APPT. WITH ENDO. SHE HAS AN APPT, BUT IT IS 60 DAYS OUT.    WOULD LIKE TO HAVE A CALL BACK.   "

## 2023-04-26 NOTE — TELEPHONE ENCOUNTER
The patient stated she is not on this she wanted to be and I told her she does not need it. And she stated she just wants something to help with her energy level

## 2023-04-28 NOTE — TELEPHONE ENCOUNTER
Her thyroid hormone level in February was normal so she does not need any thyroid medicine.  In fact, if she takes thyroid medicine when she does not need it, it could be harmful to her heart.

## 2023-06-09 RX ORDER — LISINOPRIL AND HYDROCHLOROTHIAZIDE 20; 12.5 MG/1; MG/1
1 TABLET ORAL DAILY
Qty: 90 TABLET | Refills: 3 | Status: SHIPPED | OUTPATIENT
Start: 2023-06-09

## 2023-06-09 NOTE — TELEPHONE ENCOUNTER
Rx Refill Note  Requested Prescriptions     Pending Prescriptions Disp Refills    lisinopril-hydrochlorothiazide (PRINZIDE,ZESTORETIC) 20-12.5 MG per tablet 90 tablet 3     Sig: Take 1 tablet by mouth Daily.      Last office visit with prescribing clinician: 10/31/2022   Last telemedicine visit with prescribing clinician: Visit date not found   Next office visit with prescribing clinician: 10/31/2023                         Would you like a call back once the refill request has been completed: [] Yes [] No    If the office needs to give you a call back, can they leave a voicemail: [] Yes [] No    Yanira Mcduffie MA  06/09/23, 12:34 EDT

## 2023-06-30 PROBLEM — E04.2 MULTIPLE THYROID NODULES: Status: ACTIVE | Noted: 2023-06-30

## 2023-07-23 DIAGNOSIS — F51.01 PRIMARY INSOMNIA: ICD-10-CM

## 2023-07-24 RX ORDER — TEMAZEPAM 30 MG/1
30 CAPSULE ORAL NIGHTLY PRN
Qty: 30 CAPSULE | Refills: 1 | Status: SHIPPED | OUTPATIENT
Start: 2023-07-24

## 2023-08-01 ENCOUNTER — HOSPITAL ENCOUNTER (OUTPATIENT)
Dept: ULTRASOUND IMAGING | Facility: HOSPITAL | Age: 83
Discharge: HOME | End: 2023-08-01
Payer: MEDICARE

## 2023-08-01 DIAGNOSIS — E04.2 MULTIPLE THYROID NODULES: ICD-10-CM

## 2023-08-01 PROCEDURE — 0 LIDOCAINE 1 % SOLUTION: Performed by: INTERNAL MEDICINE

## 2023-08-01 PROCEDURE — 88305 TISSUE EXAM BY PATHOLOGIST: CPT | Performed by: INTERNAL MEDICINE

## 2023-08-01 PROCEDURE — 88173 CYTOPATH EVAL FNA REPORT: CPT | Performed by: INTERNAL MEDICINE

## 2023-08-01 RX ORDER — LIDOCAINE HYDROCHLORIDE 10 MG/ML
10 INJECTION, SOLUTION INFILTRATION; PERINEURAL ONCE
Status: COMPLETED | OUTPATIENT
Start: 2023-08-01 | End: 2023-08-01

## 2023-08-01 RX ADMIN — LIDOCAINE HYDROCHLORIDE 8 ML: 10 INJECTION, SOLUTION INFILTRATION; PERINEURAL at 12:58

## 2023-08-02 LAB
LAB AP CASE REPORT: NORMAL
PATH REPORT.FINAL DX SPEC: NORMAL
PATH REPORT.GROSS SPEC: NORMAL

## 2023-08-09 DIAGNOSIS — E04.2 MULTIPLE THYROID NODULES: Primary | ICD-10-CM

## 2023-09-08 ENCOUNTER — HOSPITAL ENCOUNTER (OUTPATIENT)
Dept: PET IMAGING | Facility: HOSPITAL | Age: 83
Discharge: HOME | End: 2023-09-08
Admitting: NURSE PRACTITIONER
Payer: MEDICARE

## 2023-09-08 DIAGNOSIS — C34.2 PRIMARY CANCER OF RIGHT MIDDLE LOBE OF LUNG: ICD-10-CM

## 2023-09-08 DIAGNOSIS — D17.79 LIPOMA OF LUNG: ICD-10-CM

## 2023-09-08 DIAGNOSIS — R91.8 LUNG NODULES: ICD-10-CM

## 2023-09-08 DIAGNOSIS — Z87.891 FORMER SMOKER: ICD-10-CM

## 2023-09-08 PROCEDURE — 71250 CT THORAX DX C-: CPT

## 2023-09-11 NOTE — PROGRESS NOTES
"Chief Complaint  Lung Cancer (6 month follow up CT chest  for lung cancer )    Subjective        Leann E Fifi presents to [facility]  History of Present Illness     Ms. Calix is a pleasant 83-year-old lady who is status post right middle lobectomy by Dr. Fagan in July 2020 for stage I non-small cell lung cancer.  She has done well postoperatively and continues to undergo surveillance.  He is a former smoker with an approximate 30-pack-year history.  She has no new complaints since she was last seen in our office in March 2023.  She has some persistent shortness of breath with exertion that is mild. She remains active in her daily life and does not require any supplemental oxygen at baseline.  He is eager about the upcoming birth of her great grandchild.  She presents today feeling well with CT chest.      Objective   Vital Signs:  /83 (BP Location: Left arm, Patient Position: Sitting, Cuff Size: Large Adult)   Pulse 86   Temp 98.2 °F (36.8 °C) (Infrared)   Ht 163.8 cm (64.5\")   Wt 117 kg (258 lb)   SpO2 95%   BMI 43.60 kg/m²   Estimated body mass index is 43.6 kg/m² as calculated from the following:    Height as of this encounter: 163.8 cm (64.5\").    Weight as of this encounter: 117 kg (258 lb).             Physical Exam  Constitutional:       General: She is not in acute distress.     Appearance: Normal appearance. She is overweight. She is not ill-appearing.   HENT:      Head: Normocephalic and atraumatic.   Cardiovascular:      Rate and Rhythm: Normal rate.   Pulmonary:      Effort: Pulmonary effort is normal. No respiratory distress.   Chest:      Chest wall: No tenderness.   Musculoskeletal:         General: Normal range of motion.      Cervical back: Normal range of motion and neck supple.      Comments:      Skin:     General: Skin is warm and dry.   Neurological:      General: No focal deficit present.      Mental Status: She is alert and oriented to " person, place, and time.   Psychiatric:         Mood and Affect: Mood normal.         Thought Content: Thought content normal.      Result Review :      CT chest 9/8/2023: Stable 6 mm nodule in the right upper lobe as well as 3 stable micronodules measuring 2 to 3 mm in the right upper lobe.  Postoperative changes from right middle lobectomy.  No new or enlarging pulmonary nodules.  No pleural or pericardial effusion.  Left apical lipoma without change.    CT chest 3/2/2023:  postoperative changes consistent with a right middle lobectomy.  There is a stable 3 mm nodule in the right upper lobe and a stable 6 mm nodule in the right upper lobe.  There is a calcified granuloma in the left upper lobe and a stable pleural lipoma in the left apex.  No new or enlarging pulmonary nodules identified.  Pleural or pericardial effusion.             Assessment and Plan   Diagnoses and all orders for this visit:    1. History of lung cancer (Primary)  -     CT Chest Without Contrast; Future    2. Former smoker  -     CT Chest Without Contrast; Future    3. Lipoma of lung  -     CT Chest Without Contrast; Future      Ms. Calix is status post right middle lobectomy by Dr. Fagan in July 2020 for T1BN0 adenocarcinoma.  Her most recent CT chest is stable without evidence of recurrent disease.  She has stable sub-7 mm nodules in the right upper lobe.  We will continue her surveillance with a CT of the chest in 6 months followed by an office visit to review the findings.  We discussed the need for PET scan and/or tissue sampling should the nodules in the right upper lobe enlarged to greater than 1 cm.  She agrees to the plan of care.         I spent 34 minutes caring for June on this date of service. This time includes time spent by me in the following activities:preparing for the visit, reviewing tests, obtaining and/or reviewing a separately obtained history, performing a medically appropriate examination and/or evaluation ,  counseling and educating the patient/family/caregiver, ordering medications, tests, or procedures, documenting information in the medical record, independently interpreting results and communicating that information with the patient/family/caregiver and care coordination     Follow Up   Return in about 6 months (around 3/12/2024) for Next scheduled follow up.  Patient was given instructions and counseling regarding her condition or for health maintenance advice. Please see specific information pulled into the AVS if appropriate.

## 2023-09-12 ENCOUNTER — OFFICE VISIT (OUTPATIENT)
Dept: SURGERY | Facility: CLINIC | Age: 83
End: 2023-09-12
Payer: MEDICARE

## 2023-09-12 VITALS
DIASTOLIC BLOOD PRESSURE: 83 MMHG | SYSTOLIC BLOOD PRESSURE: 138 MMHG | WEIGHT: 258 LBS | OXYGEN SATURATION: 95 % | HEIGHT: 65 IN | TEMPERATURE: 98.2 F | BODY MASS INDEX: 42.99 KG/M2 | HEART RATE: 86 BPM

## 2023-09-12 DIAGNOSIS — Z87.891 FORMER SMOKER: ICD-10-CM

## 2023-09-12 DIAGNOSIS — D17.79 LIPOMA OF LUNG: ICD-10-CM

## 2023-09-12 DIAGNOSIS — Z85.118 HISTORY OF LUNG CANCER: Primary | ICD-10-CM

## 2023-09-12 RX ORDER — LATANOPROSTENE BUNOD 0.24 MG/ML
1 SOLUTION/ DROPS OPHTHALMIC EVERY 24 HOURS
COMMUNITY
Start: 2023-08-31

## 2023-09-12 RX ORDER — LANOLIN ALCOHOL/MO/W.PET/CERES
1000 CREAM (GRAM) TOPICAL DAILY
COMMUNITY

## 2023-09-12 RX ORDER — FLUOXETINE HYDROCHLORIDE 20 MG/1
1 CAPSULE ORAL DAILY
COMMUNITY
Start: 2023-08-22

## 2023-09-19 ENCOUNTER — PATIENT ROUNDING (BHMG ONLY) (OUTPATIENT)
Dept: SURGERY | Facility: CLINIC | Age: 83
End: 2023-09-19
Payer: MEDICARE

## 2023-09-19 DIAGNOSIS — F51.01 PRIMARY INSOMNIA: ICD-10-CM

## 2023-09-19 RX ORDER — TEMAZEPAM 30 MG/1
30 CAPSULE ORAL NIGHTLY PRN
Qty: 30 CAPSULE | Refills: 0 | Status: SHIPPED | OUTPATIENT
Start: 2023-09-19

## 2023-10-09 RX ORDER — DILTIAZEM HYDROCHLORIDE 180 MG/1
180 CAPSULE, COATED, EXTENDED RELEASE ORAL DAILY
Qty: 90 CAPSULE | Refills: 0 | Status: SHIPPED | OUTPATIENT
Start: 2023-10-09

## 2023-10-20 DIAGNOSIS — F51.01 PRIMARY INSOMNIA: ICD-10-CM

## 2023-10-20 RX ORDER — TEMAZEPAM 30 MG/1
30 CAPSULE ORAL NIGHTLY PRN
Qty: 30 CAPSULE | Refills: 0 | Status: SHIPPED | OUTPATIENT
Start: 2023-10-20

## 2023-11-06 NOTE — PROGRESS NOTES
Subjective:     Encounter Date:11/08/2023      Patient ID: Leann Calix is a 83 y.o. female.    Chief Complaint:  History of Present Illness    Leann Calix is a 83 y.o. female with a history of hypertension, hyperlipidemia, DOT who presents to the office today for routine follow up.   Patient seen and examined, labs and chart reviewed. Patient states patient comes in today with complaints to include progressively worsening shortness of breath especially with exertion and extreme fatigue.  Patient states she has been experiencing extreme fatigue for the last few months despite taking vitamin D, vitamin B12.  Patient states the dyspnea is a change in baseline and she is even having problems with daily activities.  She has not had a recent cardiac work-up as her last was in 2018.  She denies chest pain, lightheadedness/dizziness, numbness and tingling, nausea, edema, palpitations.  Patient takes all medications as prescribed, vital signs are stable today.  She is compliant with her CPAP machine for DOT.      The following portions of the patient's history were reviewed and updated as appropriate: allergies, current medications, past family history, past medical history, past social history, past surgical history, and problem list.  Past Medical History:   Diagnosis Date    Adenocarcinoma, lung, right 2020    Allergic 2007    Bisoprolol    Anemia 12/13/2022    Back pain     LOW RADICULAR PAIN LEFT LEG    Cataract 2006    Cataract surgery    Cholelithiasis 1996    Had stones    Chronic insomnia     Claustrophobia 1969    Fatty liver     Ganglion cyst     GERD (gastroesophageal reflux disease) 07/08/2021    H/O degenerative disc disease     Hiatal hernia     Hiatal hernia     Hypertension     DR ONEAL    Hyperthyroidism 04/17/2023    Hypothyroidism 4/14/23    Blood work from 4/13/23 showed low TSH level.    OAB (overactive bladder)     Obesity 1985    I'm now losing weight on HMR program    Osteoarthritis 2002     Pneumonia 1945    Sleep apnea     uses BiPAP    Spondylolisthesis 1989    Surgery    Thyroid nodule 2016    Removed nodules. Now i have more.    Uterine cancer 1984    Visual impairment 4/4/23    Fuch's corneal disease    Vitamin D deficiency 2023     Past Surgical History:   Procedure Laterality Date    BACK SURGERY  1989/2002/2009    Spondylolisthesis    BREAST BIOPSY  1971    CARDIAC CATHETERIZATION  06/2018    NL DR ONEAL    CHOLECYSTECTOMY  1996    COLONOSCOPY  2016    Clean colon found.    EYE SURGERY Bilateral 2006    Appenbrink, laser    HIP ARTHROPLASTY Right 06/07/2010     UJOSELUIS    HYSTERECTOMY  1984    CARCINOMA IN SITU    JOINT REPLACEMENT  2007    First knee replacement.    LAMINECTOMY  1989    During first back surgery.    LUMBAR DISCECTOMY  2006    DR VOGEL    LUMBAR FUSION  2002    L3/5 DR PAPPAS    LUMBAR LAMINECTOMY  1989    LUMBAR SPINAL FUSION L4/L5 DR BRUCE    OOPHORECTOMY Right 1996    PAROTIDECTOMY Right 2004    PARTIAL HIP ARTHROPLASTY  03/16/2017    Dr Michel    REVISION TOTAL KNEE ARTHROPLASTY Left 03/16/2017    DR MICHEL    ROTATOR CUFF REPAIR Right 2003    SHOULDER SURGERY  2003    Right rotator cuff.    SPINAL FUSION  1989    Spondylolisthesis    SPINE SURGERY  1989    Spondylolisthesis    SUBTOTAL HYSTERECTOMY  1996    Right ovary removed.    THORACOSCOPY Right 07/29/2020    Procedure: RIGHT VIDEO ASSISTED THORACOSCOPY WITH RIGHT MIDDLE LOBE THERAPUTIC WEDGE RESECTION AND RIGHT MIDDLE LOBECTOMY; HILAR LYMPH NODE DISSECTION;  Surgeon: Jaden Fagan MD;  Location: [facility] MAIN OR;  Service: Cardiothoracic;  Laterality: Right;    THYROID BIOPSY  01/2018    X5    THYROID SURGERY      nodules removed    TOTAL KNEE ARTHROPLASTY  2009    DR MICHEL    TUBAL ABDOMINAL LIGATION Bilateral 1976     There were no vitals taken for this visit.  Family History   Problem Relation Age of Onset    Cancer Mother         Don't know where cancer began.    Kidney disease Mother         Cancer  may have begun in kidney???    Thyroid disease Sister         Thyroid removed.    Allergies Sister     Allergies Brother     Cancer Daughter         Ewings Sarcoma    Other Daughter         Ewings Sarcoma    Cancer Other     Alcohol abuse Maternal Grandfather     Arthritis Maternal Grandmother     Cancer Maternal Aunt         Breast cancer       Current Outpatient Medications:     B Complex Vitamins (vitamin b complex) capsule capsule, Take  by mouth Daily., Disp: , Rfl:     cholecalciferol (VITAMIN D3) 25 MCG (1000 UT) tablet, Take 2 tablets by mouth Daily., Disp: , Rfl:     dilTIAZem CD (CARDIZEM CD) 180 MG 24 hr capsule, TAKE 1 CAPSULE BY MOUTH DAILY, Disp: 90 capsule, Rfl: 0    famotidine (PEPCID) 40 MG tablet, , Disp: , Rfl:     FLUoxetine (PROzac) 20 MG capsule, Take 1 capsule by mouth Daily., Disp: , Rfl:     lisinopril-hydrochlorothiazide (PRINZIDE,ZESTORETIC) 20-12.5 MG per tablet, Take 1 tablet by mouth Daily., Disp: 90 tablet, Rfl: 3    magnesium gluconate (MAGONATE) 500 MG tablet, Take 400 mg by mouth 2 (Two) Times a Day., Disp: , Rfl:     Multiple Vitamins-Minerals (MULTIVITAMIN ADULTS PO), Take 1 tablet by mouth Daily., Disp: , Rfl:     Nasacort Allergy 24HR 55 MCG/ACT nasal inhaler, 2 sprays Daily., Disp: , Rfl:     Probiotic Product (PROBIOTIC BLEND PO), Take  by mouth., Disp: , Rfl:     temazepam (RESTORIL) 30 MG capsule, Take 1 capsule by mouth At Night As Needed for Sleep., Disp: 30 capsule, Rfl: 0    vitamin B-12 (CYANOCOBALAMIN) 1000 MCG tablet, Take 1 tablet by mouth Daily., Disp: , Rfl:     Vyzulta 0.024 % solution, Apply 1 drop to eye(s) as directed by provider Daily., Disp: , Rfl:     Current Facility-Administered Medications:     cyanocobalamin injection 1,000 mcg, 1,000 mcg, Intramuscular, Q28 Days, Ryann Simon MD, 1,000 mcg at 04/17/23 1336  Allergies   Allergen Reactions    Beta Adrenergic Blockers Other (See Comments)     Couldn't breathe or move    Bisoprolol Unknown - High  Severity    Molds & Smuts Unknown - High Severity     Social History     Socioeconomic History    Marital status:     Number of children: 2    Years of education: 12   Tobacco Use    Smoking status: Former     Packs/day: 1.00     Years: 30.00     Additional pack years: 0.00     Total pack years: 30.00     Types: Cigarettes     Start date: 1976     Quit date: 2006     Years since quittin.4     Passive exposure: Past    Smokeless tobacco: Never   Vaping Use    Vaping Use: Never used   Substance and Sexual Activity    Alcohol use: No    Drug use: No    Sexual activity: Not Currently     Partners: Male     Birth control/protection: Pill, Tubal ligation, Birth control pill, Hysterectomy, Same-sex partner     Comment: Tubes tied in      Review of Systems   Constitutional: Positive for malaise/fatigue. Negative for chills and fever.   Cardiovascular:  Positive for dyspnea on exertion. Negative for chest pain, leg swelling and palpitations.   Respiratory:  Positive for shortness of breath. Negative for cough.    Gastrointestinal:  Negative for abdominal pain, nausea and vomiting.   Neurological:  Negative for dizziness, focal weakness, headaches, light-headedness and numbness.   All other systems reviewed and are negative.             Objective:     Vitals reviewed.   Constitutional:       Appearance: Healthy appearance. Not in distress. Morbidly obese.   Eyes:      Pupils: Pupils are equal, round, and reactive to light.   HENT:      Nose: Nose normal.    Mouth/Throat:      Pharynx: Oropharynx is clear.   Pulmonary:      Effort: Pulmonary effort is normal.      Breath sounds: Normal breath sounds.   Cardiovascular:      Normal rate. Regular rhythm.   Pulses:     Intact distal pulses.   Edema:     Peripheral edema absent.   Abdominal:      General: Bowel sounds are normal.   Musculoskeletal: Normal range of motion.      Cervical back: Normal range of motion and neck supple. Skin:     General: Skin  is warm.   Neurological:      General: No focal deficit present.      Mental Status: Alert and oriented to person, place and time.       ECG 12 Lead    Date/Time: 11/8/2023 4:21 PM  Performed by: Cecily Santa APRN    Authorized by: Cecily Santa APRN  Comparison: compared with previous ECG from 8/12/2020  Rhythm: sinus rhythm  Rate: normal  Other findings: low voltage    Clinical impression: normal ECG        Lab Review:    Diagnosis Plan   1. Essential hypertension        2. Hyperlipidemia, unspecified hyperlipidemia type        LAB RESULTS (LAST 7 DAYS)    Echocardiogram       CBC        BMP        CMP         BNP        TROPONIN        CoAg        Creatinine Clearance  CrCl cannot be calculated (Patient's most recent lab result is older than the maximum 30 days allowed.).    ABG        Radiology  No radiology results for the last day        MDM    Plan/recommendations:   Fatigue  Dyspnea on exertion  Patient states she has had progressively worsening dyspnea on exertion and extreme fatigue for the last few months  She is having trouble with routine daily activity secondary to symptoms  TSH, CBC, electrolytes unremarkable  Most recent cardiac workup from 2018 at that time cardiac catheterization showed normal coronaries and echocardiogram showed normal LVEF of 55 to 60% with mild MR and TR noted.   Obtain echocardiogram to evaluate LVEF and valvular abnormalities  CT of chest recently showed calcification of arteries  Obtain Myoview study to evaluate for ischemia  12-lead EKG shows sinus rhythm with low voltage  Hypertension  Blood pressure today 136/81   Continue diltiazem 180 mg and lisinopril-hydrochlorothiazide 20-12.5 mg daily     DOT   Patient compliant with CPAP machine     Hyperlipidemia  Managed by PCP   Not currently on statin therapy    Obesity, BMI 44.13  Diet and lifestyle recommendations discussed    Recommend prophylactic aspirin therapy for CAD     Patient's previous medical records, labs, and  EKG were reviewed and discussed with the patient at today's visit.     Further cardiac care based on results of echocardiogram and Myoview study

## 2023-11-08 ENCOUNTER — OFFICE VISIT (OUTPATIENT)
Dept: CARDIOLOGY | Facility: CLINIC | Age: 83
End: 2023-11-08
Payer: MEDICARE

## 2023-11-08 VITALS
HEART RATE: 89 BPM | OXYGEN SATURATION: 96 % | WEIGHT: 261 LBS | DIASTOLIC BLOOD PRESSURE: 81 MMHG | SYSTOLIC BLOOD PRESSURE: 136 MMHG | BODY MASS INDEX: 44.56 KG/M2 | HEIGHT: 64 IN

## 2023-11-08 DIAGNOSIS — R53.83 FATIGUE, UNSPECIFIED TYPE: ICD-10-CM

## 2023-11-08 DIAGNOSIS — E78.5 HYPERLIPIDEMIA, UNSPECIFIED HYPERLIPIDEMIA TYPE: ICD-10-CM

## 2023-11-08 DIAGNOSIS — I25.84 CORONARY ARTERY CALCIFICATION: ICD-10-CM

## 2023-11-08 DIAGNOSIS — I25.10 CORONARY ARTERY CALCIFICATION: ICD-10-CM

## 2023-11-08 DIAGNOSIS — R06.09 DYSPNEA ON EXERTION: ICD-10-CM

## 2023-11-08 DIAGNOSIS — I10 ESSENTIAL HYPERTENSION: Primary | Chronic | ICD-10-CM

## 2023-11-08 PROCEDURE — 3079F DIAST BP 80-89 MM HG: CPT

## 2023-11-08 PROCEDURE — 3075F SYST BP GE 130 - 139MM HG: CPT

## 2023-11-08 PROCEDURE — 93000 ELECTROCARDIOGRAM COMPLETE: CPT

## 2023-11-08 PROCEDURE — 1160F RVW MEDS BY RX/DR IN RCRD: CPT

## 2023-11-08 PROCEDURE — 1159F MED LIST DOCD IN RCRD: CPT

## 2023-11-08 PROCEDURE — 99214 OFFICE O/P EST MOD 30 MIN: CPT

## 2023-11-19 DIAGNOSIS — F51.01 PRIMARY INSOMNIA: ICD-10-CM

## 2023-11-20 RX ORDER — TEMAZEPAM 30 MG/1
30 CAPSULE ORAL NIGHTLY PRN
Qty: 30 CAPSULE | Refills: 0 | Status: SHIPPED | OUTPATIENT
Start: 2023-11-20

## 2023-11-28 RX ORDER — FLUOXETINE HYDROCHLORIDE 20 MG/1
20 CAPSULE ORAL DAILY
Qty: 90 CAPSULE | Refills: 3 | Status: SHIPPED | OUTPATIENT
Start: 2023-11-28

## 2023-12-06 ENCOUNTER — HOSPITAL ENCOUNTER (OUTPATIENT)
Dept: CARDIOLOGY | Facility: HOSPITAL | Age: 83
Discharge: HOME | End: 2023-12-06
Payer: MEDICARE

## 2023-12-06 DIAGNOSIS — R06.09 DYSPNEA ON EXERTION: ICD-10-CM

## 2023-12-06 DIAGNOSIS — I25.84 CORONARY ARTERY CALCIFICATION: ICD-10-CM

## 2023-12-06 DIAGNOSIS — R53.83 FATIGUE, UNSPECIFIED TYPE: ICD-10-CM

## 2023-12-06 DIAGNOSIS — I25.10 CORONARY ARTERY CALCIFICATION: ICD-10-CM

## 2023-12-06 LAB
BH CV ECHO MEAS - ACS: 1.88 CM
BH CV ECHO MEAS - AO MAX PG: 9.5 MMHG
BH CV ECHO MEAS - AO MEAN PG: 5 MMHG
BH CV ECHO MEAS - AO ROOT DIAM: 3.4 CM
BH CV ECHO MEAS - AO V2 MAX: 154.3 CM/SEC
BH CV ECHO MEAS - AO V2 VTI: 28.6 CM
BH CV ECHO MEAS - AVA(I,D): 2.9 CM2
BH CV ECHO MEAS - EDV(CUBED): 60.7 ML
BH CV ECHO MEAS - EDV(MOD-SP4): 111.4 ML
BH CV ECHO MEAS - EF(MOD-BP): 65 %
BH CV ECHO MEAS - EF(MOD-SP4): 65 %
BH CV ECHO MEAS - ESV(CUBED): 17.6 ML
BH CV ECHO MEAS - ESV(MOD-SP4): 39 ML
BH CV ECHO MEAS - FS: 33.8 %
BH CV ECHO MEAS - IVS/LVPW: 0.98 CM
BH CV ECHO MEAS - IVSD: 1.14 CM
BH CV ECHO MEAS - LA DIMENSION: 4.1 CM
BH CV ECHO MEAS - LV MASS(C)D: 151.9 GRAMS
BH CV ECHO MEAS - LV MAX PG: 9.7 MMHG
BH CV ECHO MEAS - LV MEAN PG: 5.3 MMHG
BH CV ECHO MEAS - LV V1 MAX: 155.7 CM/SEC
BH CV ECHO MEAS - LV V1 VTI: 28.3 CM
BH CV ECHO MEAS - LVIDD: 3.9 CM
BH CV ECHO MEAS - LVIDS: 2.6 CM
BH CV ECHO MEAS - LVOT AREA: 2.9 CM2
BH CV ECHO MEAS - LVOT DIAM: 1.91 CM
BH CV ECHO MEAS - LVPWD: 1.16 CM
BH CV ECHO MEAS - MV A MAX VEL: 134.5 CM/SEC
BH CV ECHO MEAS - MV DEC SLOPE: 355.2 CM/SEC2
BH CV ECHO MEAS - MV DEC TIME: 0.26 SEC
BH CV ECHO MEAS - MV E MAX VEL: 90.8 CM/SEC
BH CV ECHO MEAS - MV E/A: 0.68
BH CV ECHO MEAS - MV MAX PG: 9.3 MMHG
BH CV ECHO MEAS - MV MEAN PG: 3.1 MMHG
BH CV ECHO MEAS - MV V2 VTI: 35.8 CM
BH CV ECHO MEAS - MVA(VTI): 2.28 CM2
BH CV ECHO MEAS - PA ACC TIME: 0.07 SEC
BH CV ECHO MEAS - PULM A REVS DUR: 0.12 SEC
BH CV ECHO MEAS - PULM A REVS VEL: 34.7 CM/SEC
BH CV ECHO MEAS - PULM DIAS VEL: 26.5 CM/SEC
BH CV ECHO MEAS - PULM S/D: 1.95
BH CV ECHO MEAS - PULM SYS VEL: 51.6 CM/SEC
BH CV ECHO MEAS - RAP SYSTOLE: 8 MMHG
BH CV ECHO MEAS - RV MAX PG: 2.8 MMHG
BH CV ECHO MEAS - RV V1 MAX: 84 CM/SEC
BH CV ECHO MEAS - RV V1 VTI: 12.4 CM
BH CV ECHO MEAS - SV(LVOT): 81.5 ML
BH CV ECHO MEAS - SV(MOD-SP4): 72.4 ML
BH CV REST NUCLEAR ISOTOPE DOSE: 10.5 MCI
BH CV STRESS COMMENTS STAGE 1: NORMAL
BH CV STRESS DOSE REGADENOSON STAGE 1: 0.4
BH CV STRESS DURATION MIN STAGE 1: 0
BH CV STRESS DURATION SEC STAGE 1: 10
BH CV STRESS NUCLEAR ISOTOPE DOSE: 33.5 MCI
BH CV STRESS PROTOCOL 1: NORMAL
BH CV STRESS RECOVERY BP: NORMAL MMHG
BH CV STRESS RECOVERY HR: 108 BPM
BH CV STRESS STAGE 1: 1
LV EF NUC BP: 76 %
MAXIMAL PREDICTED HEART RATE: 138 BPM
STRESS BASELINE BP: NORMAL MMHG
STRESS BASELINE HR: 89 BPM
STRESS TARGET HR: 117 BPM

## 2023-12-06 PROCEDURE — 25010000002 REGADENOSON 0.4 MG/5ML SOLUTION: Performed by: INTERNAL MEDICINE

## 2023-12-06 PROCEDURE — 0 TECHNETIUM SESTAMIBI: Performed by: INTERNAL MEDICINE

## 2023-12-06 PROCEDURE — A9500 TC99M SESTAMIBI: HCPCS | Performed by: INTERNAL MEDICINE

## 2023-12-06 PROCEDURE — 93306 TTE W/DOPPLER COMPLETE: CPT

## 2023-12-06 PROCEDURE — 25010000002 SULFUR HEXAFLUORIDE MICROSPH 60.7-25 MG RECONSTITUTED SUSPENSION: Performed by: INTERNAL MEDICINE

## 2023-12-06 PROCEDURE — 93017 CV STRESS TEST TRACING ONLY: CPT

## 2023-12-06 PROCEDURE — 78452 HT MUSCLE IMAGE SPECT MULT: CPT

## 2023-12-06 RX ORDER — REGADENOSON 0.08 MG/ML
0.4 INJECTION, SOLUTION INTRAVENOUS
Status: COMPLETED | OUTPATIENT
Start: 2023-12-06 | End: 2023-12-06

## 2023-12-06 RX ADMIN — REGADENOSON 0.4 MG: 0.08 INJECTION, SOLUTION INTRAVENOUS at 13:35

## 2023-12-06 RX ADMIN — TECHNETIUM TC 99M SESTAMIBI 1 DOSE: 1 INJECTION INTRAVENOUS at 12:25

## 2023-12-06 RX ADMIN — SULFUR HEXAFLUORIDE 2 ML: KIT at 12:31

## 2023-12-06 RX ADMIN — TECHNETIUM TC 99M SESTAMIBI 1 DOSE: 1 INJECTION INTRAVENOUS at 13:34

## 2023-12-15 ENCOUNTER — TELEPHONE (OUTPATIENT)
Dept: FAMILY MEDICINE CLINIC | Facility: CLINIC | Age: 83
End: 2023-12-15

## 2023-12-15 ENCOUNTER — OFFICE VISIT (OUTPATIENT)
Dept: FAMILY MEDICINE CLINIC | Facility: CLINIC | Age: 83
End: 2023-12-15
Payer: MEDICARE

## 2023-12-15 ENCOUNTER — LAB (OUTPATIENT)
Dept: FAMILY MEDICINE CLINIC | Facility: CLINIC | Age: 83
End: 2023-12-15
Payer: MEDICARE

## 2023-12-15 VITALS
SYSTOLIC BLOOD PRESSURE: 178 MMHG | WEIGHT: 264.8 LBS | BODY MASS INDEX: 45.21 KG/M2 | HEART RATE: 74 BPM | DIASTOLIC BLOOD PRESSURE: 82 MMHG | TEMPERATURE: 97.4 F | OXYGEN SATURATION: 93 % | HEIGHT: 64 IN

## 2023-12-15 DIAGNOSIS — Z00.00 MEDICARE ANNUAL WELLNESS VISIT, SUBSEQUENT: Primary | ICD-10-CM

## 2023-12-15 DIAGNOSIS — I10 ESSENTIAL HYPERTENSION: ICD-10-CM

## 2023-12-15 DIAGNOSIS — R53.83 FATIGUE, UNSPECIFIED TYPE: ICD-10-CM

## 2023-12-15 DIAGNOSIS — E05.90 HYPERTHYROIDISM: ICD-10-CM

## 2023-12-15 DIAGNOSIS — E53.8 VITAMIN B12 DEFICIENCY: ICD-10-CM

## 2023-12-15 DIAGNOSIS — E55.9 VITAMIN D DEFICIENCY: ICD-10-CM

## 2023-12-15 DIAGNOSIS — F51.01 PRIMARY INSOMNIA: ICD-10-CM

## 2023-12-15 LAB
25(OH)D3 SERPL-MCNC: 40.5 NG/ML (ref 30–100)
ALBUMIN SERPL-MCNC: 4.2 G/DL (ref 3.5–5.2)
ALBUMIN/GLOB SERPL: 1.3 G/DL
ALP SERPL-CCNC: 72 U/L (ref 39–117)
ALT SERPL W P-5'-P-CCNC: 14 U/L (ref 1–33)
ANION GAP SERPL CALCULATED.3IONS-SCNC: 12 MMOL/L (ref 5–15)
AST SERPL-CCNC: 16 U/L (ref 1–32)
BASOPHILS # BLD AUTO: 0.05 10*3/MM3 (ref 0–0.2)
BASOPHILS NFR BLD AUTO: 0.5 % (ref 0–1.5)
BILIRUB SERPL-MCNC: 0.2 MG/DL (ref 0–1.2)
BUN SERPL-MCNC: 29 MG/DL (ref 8–23)
BUN/CREAT SERPL: 31.5 (ref 7–25)
CALCIUM SPEC-SCNC: 10.2 MG/DL (ref 8.6–10.5)
CHLORIDE SERPL-SCNC: 103 MMOL/L (ref 98–107)
CHOLEST SERPL-MCNC: 192 MG/DL (ref 0–200)
CO2 SERPL-SCNC: 23 MMOL/L (ref 22–29)
CREAT SERPL-MCNC: 0.92 MG/DL (ref 0.57–1)
DEPRECATED RDW RBC AUTO: 40.6 FL (ref 37–54)
EGFRCR SERPLBLD CKD-EPI 2021: 62.3 ML/MIN/1.73
EOSINOPHIL # BLD AUTO: 0.04 10*3/MM3 (ref 0–0.4)
EOSINOPHIL NFR BLD AUTO: 0.4 % (ref 0.3–6.2)
ERYTHROCYTE [DISTWIDTH] IN BLOOD BY AUTOMATED COUNT: 12.6 % (ref 12.3–15.4)
GLOBULIN UR ELPH-MCNC: 3.2 GM/DL
GLUCOSE SERPL-MCNC: 97 MG/DL (ref 65–99)
HCT VFR BLD AUTO: 40.4 % (ref 34–46.6)
HDLC SERPL-MCNC: 64 MG/DL (ref 40–60)
HGB BLD-MCNC: 13.1 G/DL (ref 12–15.9)
IMM GRANULOCYTES # BLD AUTO: 0.04 10*3/MM3 (ref 0–0.05)
IMM GRANULOCYTES NFR BLD AUTO: 0.4 % (ref 0–0.5)
LDLC SERPL CALC-MCNC: 114 MG/DL (ref 0–100)
LDLC/HDLC SERPL: 1.76 {RATIO}
LYMPHOCYTES # BLD AUTO: 1.91 10*3/MM3 (ref 0.7–3.1)
LYMPHOCYTES NFR BLD AUTO: 20.6 % (ref 19.6–45.3)
MCH RBC QN AUTO: 29.2 PG (ref 26.6–33)
MCHC RBC AUTO-ENTMCNC: 32.4 G/DL (ref 31.5–35.7)
MCV RBC AUTO: 90 FL (ref 79–97)
MONOCYTES # BLD AUTO: 0.77 10*3/MM3 (ref 0.1–0.9)
MONOCYTES NFR BLD AUTO: 8.3 % (ref 5–12)
NEUTROPHILS NFR BLD AUTO: 6.47 10*3/MM3 (ref 1.7–7)
NEUTROPHILS NFR BLD AUTO: 69.8 % (ref 42.7–76)
NRBC BLD AUTO-RTO: 0 /100 WBC (ref 0–0.2)
PLATELET # BLD AUTO: 283 10*3/MM3 (ref 140–450)
PMV BLD AUTO: 10.4 FL (ref 6–12)
POTASSIUM SERPL-SCNC: 4.2 MMOL/L (ref 3.5–5.2)
PROT SERPL-MCNC: 7.4 G/DL (ref 6–8.5)
RBC # BLD AUTO: 4.49 10*6/MM3 (ref 3.77–5.28)
SODIUM SERPL-SCNC: 138 MMOL/L (ref 136–145)
TRIGL SERPL-MCNC: 77 MG/DL (ref 0–150)
TSH SERPL DL<=0.05 MIU/L-ACNC: 0.39 UIU/ML (ref 0.27–4.2)
VIT B12 BLD-MCNC: 526 PG/ML (ref 211–946)
VLDLC SERPL-MCNC: 14 MG/DL (ref 5–40)
WBC NRBC COR # BLD AUTO: 9.28 10*3/MM3 (ref 3.4–10.8)

## 2023-12-15 PROCEDURE — 85025 COMPLETE CBC W/AUTO DIFF WBC: CPT | Performed by: FAMILY MEDICINE

## 2023-12-15 PROCEDURE — 3077F SYST BP >= 140 MM HG: CPT | Performed by: FAMILY MEDICINE

## 2023-12-15 PROCEDURE — 1170F FXNL STATUS ASSESSED: CPT | Performed by: FAMILY MEDICINE

## 2023-12-15 PROCEDURE — 3079F DIAST BP 80-89 MM HG: CPT | Performed by: FAMILY MEDICINE

## 2023-12-15 PROCEDURE — 80061 LIPID PANEL: CPT | Performed by: FAMILY MEDICINE

## 2023-12-15 PROCEDURE — 1159F MED LIST DOCD IN RCRD: CPT | Performed by: FAMILY MEDICINE

## 2023-12-15 PROCEDURE — 82306 VITAMIN D 25 HYDROXY: CPT | Performed by: FAMILY MEDICINE

## 2023-12-15 PROCEDURE — 82607 VITAMIN B-12: CPT | Performed by: FAMILY MEDICINE

## 2023-12-15 PROCEDURE — 1160F RVW MEDS BY RX/DR IN RCRD: CPT | Performed by: FAMILY MEDICINE

## 2023-12-15 PROCEDURE — 36415 COLL VENOUS BLD VENIPUNCTURE: CPT | Performed by: FAMILY MEDICINE

## 2023-12-15 PROCEDURE — 80053 COMPREHEN METABOLIC PANEL: CPT | Performed by: FAMILY MEDICINE

## 2023-12-15 PROCEDURE — G0439 PPPS, SUBSEQ VISIT: HCPCS | Performed by: FAMILY MEDICINE

## 2023-12-15 PROCEDURE — 84443 ASSAY THYROID STIM HORMONE: CPT | Performed by: FAMILY MEDICINE

## 2023-12-15 RX ORDER — TEMAZEPAM 30 MG/1
30 CAPSULE ORAL NIGHTLY PRN
Qty: 30 CAPSULE | Refills: 0 | Status: SHIPPED | OUTPATIENT
Start: 2023-12-15

## 2023-12-15 NOTE — TELEPHONE ENCOUNTER
I called to ask about what I do if the pt is stating her CPAP mask is not fitting correctly and the air pressure is to high -     [place] stated to bring it in and they will check both - she needs to call to make an appointment       I called the pt to verbally inform her of this she understand and will destin to make an appointment with them

## 2023-12-15 NOTE — PROGRESS NOTES
The ABCs of the Annual Wellness Visit  Subsequent Medicare Wellness Visit    Subjective      Leann Calix is a 83 y.o. female who presents for a Subsequent Medicare Wellness Visit. Reports fatigue - wakes up tired, goes to bed tired.  She uses Temzepam to try to help her sleep, takes up to 3 hours to fall asleep .Uses CPAP nightly.  She recently got a new facemask but does not feel like it is working as well.  Gets supplies from [employer]      The following portions of the patient's history were reviewed and   updated as appropriate: allergies, current medications, past family history, past medical history, past social history, past surgical history, and problem list.    Compared to one year ago, the patient feels her physical   health is the same.    Compared to one year ago, the patient feels her mental   health is the same.    Recent Hospitalizations:  She was not admitted to the hospital during the last year.       Current Medical Providers:  Patient Care Team:  Ryann Simon MD as PCP - General  Ryann Simon MD as PCP - Family Medicine  Thuan Hickman MD as Consulting Physician (Cardiology)  Eddie Meeks MD as Consulting Physician (Otolaryngology)  Emi Leary APRN as Nurse Practitioner (Nurse Practitioner)  Cecily Santa APRN as Nurse Practitioner (Cardiology)  Sheila Wolf DNP, NOAM as Nurse Practitioner (Thoracic Surgery)    Outpatient Medications Prior to Visit   Medication Sig Dispense Refill    B Complex Vitamins (vitamin b complex) capsule capsule Take  by mouth Daily.      cholecalciferol (VITAMIN D3) 25 MCG (1000 UT) tablet Take 2 tablets by mouth Daily.      dilTIAZem CD (CARDIZEM CD) 180 MG 24 hr capsule TAKE 1 CAPSULE BY MOUTH DAILY 90 capsule 0    famotidine (PEPCID) 40 MG tablet       FLUoxetine (PROzac) 20 MG capsule Take 1 capsule by mouth Daily. 90 capsule 3    lisinopril-hydrochlorothiazide (PRINZIDE,ZESTORETIC) 20-12.5 MG per tablet Take 1 tablet by mouth  Daily. 90 tablet 3    magnesium gluconate (MAGONATE) 500 MG tablet Take 400 mg by mouth 2 (Two) Times a Day.      Multiple Vitamins-Minerals (MULTIVITAMIN ADULTS PO) Take 1 tablet by mouth Daily.      Nasacort Allergy 24HR 55 MCG/ACT nasal inhaler 2 sprays Daily.      temazepam (RESTORIL) 30 MG capsule Take 1 capsule by mouth At Night As Needed for Sleep. 30 capsule 0    vitamin B-12 (CYANOCOBALAMIN) 1000 MCG tablet Take 1 tablet by mouth Daily.       Facility-Administered Medications Prior to Visit   Medication Dose Route Frequency Provider Last Rate Last Admin    cyanocobalamin injection 1,000 mcg  1,000 mcg Intramuscular Q28 Days Ryann Simon MD   1,000 mcg at 04/17/23 1336       No opioid medication identified on active medication list. I have reviewed chart for other potential  high risk medication/s and harmful drug interactions in the elderly.        Aspirin is not on active medication list.  Aspirin use is not indicated based on review of current medical condition/s. Risk of harm outweighs potential benefits.  .    Patient Active Problem List   Diagnosis    Inflammatory polyarthropathy    Essential hypertension    Status post hip replacement    Thyroid nodule    Degeneration of intervertebral disc    Depression    Fatty liver    Insomnia    Low back pain    Lumbosacral radiculopathy    Spinal stenosis of lumbar region    Obesity (BMI 30-39.9)    Postmenopausal status    Tobacco dependence in remission    Encounter for screening mammogram for malignant neoplasm of breast     Dry eye syndrome, bilateral    Fuchs' corneal dystrophy    Lens replaced by other means    Ocular hypertension, bilateral    Open angle with borderline findings, low risk, bilateral    Pseudophakia of both eyes    PVD (posterior vitreous detachment), both eyes    Vitreous degeneration    Sleep apnea    Primary cancer of right middle lobe of lung    Medicare annual wellness visit, subsequent    Right upper lobe pulmonary nodule     "Urge incontinence    Hyperglycemia    Fatigue    Hyperlipidemia    Non-recurrent acute suppurative otitis media of right ear without spontaneous rupture of tympanic membrane    Anxiety    Hyperthyroidism    Vitamin B12 deficiency    Vitamin D deficiency    Multiple thyroid nodules     Advance Care Planning   Advance Care Planning     Advance Directive is on file.  ACP discussion was held with the patient during this visit. Patient has an advance directive in EMR which is still valid.      Objective    Vitals:    12/15/23 1307   BP: 178/82   BP Location: Left arm   Patient Position: Sitting   Cuff Size: Large Adult   Pulse: 74   Temp: 97.4 °F (36.3 °C)   TempSrc: Infrared   SpO2: 93%   Weight: 120 kg (264 lb 12.8 oz)   Height: 163.8 cm (64.49\")     Estimated body mass index is 44.76 kg/m² as calculated from the following:    Height as of this encounter: 163.8 cm (64.49\").    Weight as of this encounter: 120 kg (264 lb 12.8 oz).     Class 3 Severe Obesity (BMI >=40). Obesity-related health conditions include the following: obstructive sleep apnea and hypertension. Obesity is unchanged. BMI is is above average; BMI management plan is completed. We discussed portion control and increasing exercise.   Physical Exam  Vitals and nursing note reviewed.   Constitutional:       General: She is not in acute distress.     Appearance: She is well-developed.   HENT:      Head: Normocephalic.   Eyes:      General: Lids are normal.      Conjunctiva/sclera: Conjunctivae normal.   Neck:      Thyroid: No thyroid mass or thyromegaly.      Trachea: Trachea normal.   Cardiovascular:      Rate and Rhythm: Normal rate and regular rhythm.      Heart sounds: Normal heart sounds.   Pulmonary:      Effort: Pulmonary effort is normal.      Breath sounds: Normal breath sounds.   Musculoskeletal:      Cervical back: Normal range of motion.      Right lower leg: No edema.      Left lower leg: Edema present.   Lymphadenopathy:      Cervical: No " cervical adenopathy.   Skin:     General: Skin is warm and dry.   Neurological:      Mental Status: She is alert and oriented to person, place, and time.   Psychiatric:         Attention and Perception: She is attentive.         Mood and Affect: Mood normal.         Speech: Speech normal.         Behavior: Behavior normal.           Does the patient have evidence of cognitive impairment?   No            HEALTH RISK ASSESSMENT    Smoking Status:  Social History     Tobacco Use   Smoking Status Former    Packs/day: 1.00    Years: 30.00    Additional pack years: 0.00    Total pack years: 30.00    Types: Cigarettes    Start date: 1976    Quit date: 2006    Years since quittin.5    Passive exposure: Past   Smokeless Tobacco Never     Alcohol Consumption:  Social History     Substance and Sexual Activity   Alcohol Use No     Fall Risk Screen:    STEADI Fall Risk Assessment was completed, and patient is at LOW risk for falls.Assessment completed on:12/15/2023    Depression Screenin/15/2023     1:18 PM   PHQ-2/PHQ-9 Depression Screening   Little Interest or Pleasure in Doing Things 0-->not at all   Feeling Down, Depressed or Hopeless 0-->not at all   PHQ-9: Brief Depression Severity Measure Score 0       Health Habits and Functional and Cognitive Screenin/15/2023     1:00 PM   Functional & Cognitive Status   Do you have difficulty preparing food and eating? No   Do you have difficulty bathing yourself, getting dressed or grooming yourself? No   Do you have difficulty using the toilet? No   Do you have difficulty moving around from place to place? No   Do you have trouble with steps or getting out of a bed or a chair? No   Current Diet Well Balanced Diet   Dental Exam Up to date   Eye Exam Up to date   Do you need help using the phone?  No   Are you deaf or do you have serious difficulty hearing?  No   Do you need help to go to places out of walking distance? No   Do you need help shopping?  No   Do you need help preparing meals?  No   Do you need help with housework?  No   Do you need help with laundry? No   Do you need help taking your medications? No   Do you ever drive or ride in a car without wearing a seat belt? No   Have you felt unusual stress, anger or loneliness in the last month? No   Who do you live with? Alone   If you need help, do you have trouble finding someone available to you? No   Do you have difficulty concentrating, remembering or making decisions? No       Age-appropriate Screening Schedule:  Refer to the list below for future screening recommendations based on patient's age, sex and/or medical conditions. Orders for these recommended tests are listed in the plan section. The patient has been provided with a written plan.    Health Maintenance   Topic Date Due    LIPID PANEL  12/14/2022    COVID-19 Vaccine (6 - 2023-24 season) 12/17/2023 (Originally 9/1/2023)    DXA SCAN  02/08/2024    BMI FOLLOWUP  04/17/2024    ANNUAL WELLNESS VISIT  12/15/2024    TDAP/TD VACCINES (2 - Td or Tdap) 02/23/2033    INFLUENZA VACCINE  Completed    Pneumococcal Vaccine 65+  Completed    ZOSTER VACCINE  Completed                  CMS Preventative Services Quick Reference  Risk Factors Identified During Encounter:    Inactivity/Sedentary: Patient was advised to exercise at least 150 minutes a week per CDC recommendations.  Dental Screening Recommended  Vision Screening Recommended    The above risks/problems have been discussed with the patient.  Pertinent information has been shared with the patient in the After Visit Summary.    Diagnoses and all orders for this visit:    1. Medicare annual wellness visit, subsequent (Primary)    2. Fatigue, unspecified type  -     CBC & Differential  -     Comprehensive Metabolic Panel  -     Lipid Panel  -     TSH    3. Vitamin D deficiency  -     Vitamin D,25-Hydroxy    4. Vitamin B12 deficiency  -     Vitamin B12    5. Hyperthyroidism  -     TSH    6. Essential  hypertension  -     Comprehensive Metabolic Panel  -     Lipid Panel    7. Primary insomnia  -     temazepam (RESTORIL) 30 MG capsule; Take 1 capsule by mouth At Night As Needed for Sleep.  Dispense: 30 capsule; Refill: 0        Follow Up:   Next Medicare Wellness visit to be scheduled in 1 year.      An After Visit Summary and PPPS were made available to the patient.

## 2023-12-19 DIAGNOSIS — F51.01 PRIMARY INSOMNIA: ICD-10-CM

## 2023-12-20 RX ORDER — TEMAZEPAM 30 MG/1
30 CAPSULE ORAL NIGHTLY PRN
Qty: 30 CAPSULE | Refills: 0 | Status: SHIPPED | OUTPATIENT
Start: 2023-12-20

## 2024-01-03 RX ORDER — DILTIAZEM HYDROCHLORIDE 180 MG/1
180 CAPSULE, COATED, EXTENDED RELEASE ORAL DAILY
Qty: 90 CAPSULE | Refills: 1 | Status: SHIPPED | OUTPATIENT
Start: 2024-01-03

## 2024-01-15 DIAGNOSIS — F51.01 PRIMARY INSOMNIA: ICD-10-CM

## 2024-01-16 RX ORDER — TEMAZEPAM 30 MG/1
30 CAPSULE ORAL NIGHTLY PRN
Qty: 30 CAPSULE | Refills: 1 | Status: SHIPPED | OUTPATIENT
Start: 2024-01-16

## 2024-02-01 ENCOUNTER — HOSPITAL ENCOUNTER (OUTPATIENT)
Dept: ULTRASOUND IMAGING | Facility: HOSPITAL | Age: 84
Discharge: HOME | End: 2024-02-01
Admitting: INTERNAL MEDICINE
Payer: MEDICARE

## 2024-02-01 DIAGNOSIS — E04.2 MULTIPLE THYROID NODULES: ICD-10-CM

## 2024-02-01 PROCEDURE — 76536 US EXAM OF HEAD AND NECK: CPT

## 2024-02-12 ENCOUNTER — HOSPITAL ENCOUNTER (OUTPATIENT)
Dept: CT IMAGING | Facility: HOSPITAL | Age: 84
Discharge: HOME | End: 2024-02-12
Admitting: NURSE PRACTITIONER
Payer: MEDICARE

## 2024-02-12 DIAGNOSIS — Z87.891 FORMER SMOKER: ICD-10-CM

## 2024-02-12 DIAGNOSIS — Z85.118 HISTORY OF LUNG CANCER: ICD-10-CM

## 2024-02-12 DIAGNOSIS — D17.79 LIPOMA OF LUNG: ICD-10-CM

## 2024-02-12 PROCEDURE — 71250 CT THORAX DX C-: CPT

## 2024-02-19 ENCOUNTER — TELEPHONE (OUTPATIENT)
Dept: SURGERY | Facility: CLINIC | Age: 84
End: 2024-02-19
Payer: MEDICARE

## 2024-02-19 NOTE — TELEPHONE ENCOUNTER
I spoke with pt daughter regarding getting in contact with pt.Pt daughter stated she will relay my telephone number and name so pt can return my call.     DIONNE 8:12  2/19/2024

## 2024-03-11 ENCOUNTER — TELEPHONE (OUTPATIENT)
Dept: SURGERY | Facility: CLINIC | Age: 84
End: 2024-03-11
Payer: MEDICARE

## 2024-03-11 ENCOUNTER — OFFICE VISIT (OUTPATIENT)
Dept: ENDOCRINOLOGY | Facility: CLINIC | Age: 84
End: 2024-03-11
Payer: MEDICARE

## 2024-03-11 VITALS
WEIGHT: 258 LBS | SYSTOLIC BLOOD PRESSURE: 142 MMHG | HEIGHT: 64 IN | BODY MASS INDEX: 44.05 KG/M2 | HEART RATE: 94 BPM | OXYGEN SATURATION: 94 % | DIASTOLIC BLOOD PRESSURE: 78 MMHG

## 2024-03-11 DIAGNOSIS — E04.2 MULTIPLE THYROID NODULES: Primary | ICD-10-CM

## 2024-03-11 PROCEDURE — 1160F RVW MEDS BY RX/DR IN RCRD: CPT | Performed by: INTERNAL MEDICINE

## 2024-03-11 PROCEDURE — 3078F DIAST BP <80 MM HG: CPT | Performed by: INTERNAL MEDICINE

## 2024-03-11 PROCEDURE — 3077F SYST BP >= 140 MM HG: CPT | Performed by: INTERNAL MEDICINE

## 2024-03-11 PROCEDURE — G2211 COMPLEX E/M VISIT ADD ON: HCPCS | Performed by: INTERNAL MEDICINE

## 2024-03-11 PROCEDURE — 99214 OFFICE O/P EST MOD 30 MIN: CPT | Performed by: INTERNAL MEDICINE

## 2024-03-11 PROCEDURE — 1159F MED LIST DOCD IN RCRD: CPT | Performed by: INTERNAL MEDICINE

## 2024-03-11 RX ORDER — TRAVOPROST OPHTHALMIC SOLUTION 0.04 MG/ML
SOLUTION OPHTHALMIC
COMMUNITY
Start: 2024-02-10

## 2024-03-11 NOTE — PROGRESS NOTES
Endocrine Progress Note Outpatient     Patient Care Team:  Ryann Simon MD as PCP - General  Ryann Simon MD as PCP - Family Medicine  Thuan Hickman MD as Consulting Physician (Cardiology)  Eddie Mekes MD as Consulting Physician (Otolaryngology)  Emi Leary APRN as Nurse Practitioner (Nurse Practitioner)  Cecily Santa APRN as Nurse Practitioner (Cardiology)  Sheila Wolf DNP, APRN as Nurse Practitioner (Thoracic Surgery)    Chief Complaint:     Chief Complaint: Thyroid nodule/abnormal thyroid function test    HPI: This is a 83-year-old female with history of multiple thyroid nodules is here for follow-up.  She had an ultrasound thyroid done back in July 2023 which did show a right dominant thyroid nodule which was biopsied and was found to be benign.  Repeat thyroid function test has been normal.  She is not taking any thyroid medications at this time.      There is no family history of thyroid cancer or history of radiation exposure.  She denies any trouble swallowing or choking or change in the voice or hoarseness.  She does admit some sweating and insomnia may be some depression but denies any excessive fatigue or tiredness.  She is not taking any thyroid medications at this time.    Past Medical History:   Diagnosis Date    Adenocarcinoma, lung, right 2020    Allergic 2007    Bisoprolol    Anemia 12/13/2022    Back pain     LOW RADICULAR PAIN LEFT LEG    Cataract 2006    Cataract surgery    Cholelithiasis 1996    Had stones    Chronic insomnia     Claustrophobia 1969    Fatty liver     Ganglion cyst     GERD (gastroesophageal reflux disease) 07/08/2021    H/O degenerative disc disease     Hiatal hernia     Hiatal hernia     Hyperlipidemia 12/07/2021    Hypertension     DR HICKMAN    Hyperthyroidism 04/17/2023    Hypothyroidism 4/14/23    Blood work from 4/13/23 showed low TSH level.    OAB (overactive bladder)     Obesity 1985    I'm now losing weight on HMR program     Osteoarthritis 2002    Pneumonia 1945    Sleep apnea     uses BiPAP    Spondylolisthesis     Surgery    Thyroid nodule 2016    Removed nodules. Now i have more.    Uterine cancer 1984    Visual impairment 24    Fuch's corneal disease    Vitamin D deficiency        Social History     Socioeconomic History    Marital status:     Number of children: 2    Years of education: 12   Tobacco Use    Smoking status: Former     Current packs/day: 0.00     Average packs/day: 1 pack/day for 30.0 years (30.0 ttl pk-yrs)     Types: Cigarettes     Start date: 1976     Quit date: 2006     Years since quittin.7     Passive exposure: Past    Smokeless tobacco: Never   Vaping Use    Vaping status: Never Used   Substance and Sexual Activity    Alcohol use: No    Drug use: No    Sexual activity: Not Currently     Partners: Male     Birth control/protection: Pill, Tubal ligation, Birth control pill, Hysterectomy, Same-sex partner     Comment: Tubes tied in        Family History   Problem Relation Age of Onset    Cancer Mother         Don't know where cancer began.    Kidney disease Mother         Cancer may have begun in kidney???    Thyroid disease Sister         Thyroid removed.    Allergies Sister     Allergies Brother     Cancer Daughter         Ewings Sarcoma    Other Daughter         Ewings Sarcoma    Cancer Other     Alcohol abuse Maternal Grandfather     Arthritis Maternal Grandmother     Cancer Maternal Aunt         Breast cancer       Allergies   Allergen Reactions    Beta Adrenergic Blockers Other (See Comments)     Couldn't breathe or move    Bisoprolol Unknown - High Severity    Molds & Smuts Unknown - High Severity       ROS:   Constitutional:  Denies fatigue, tiredness.    Eyes:  Denies change in visual acuity   HENT:  Denies nasal congestion or sore throat   Respiratory: denies cough, shortness of breath.   Cardiovascular:  denies chest pain, edema   GI:  Denies abdominal pain,  nausea, vomiting.   Musculoskeletal:  Denies back pain or joint pain   Integument:  Denies dry skin and rash   Neurologic:  Denies headache, focal weakness or sensory changes   Endocrine:  Denies polyuria or polydipsia   Psychiatric:  Denies depression or anxiety      Vitals:    03/11/24 1057   BP: 142/78   Pulse: 94   SpO2: 94%     Body mass index is 43.62 kg/m².     Physical Exam:  GEN: NAD, conversant  EYES: EOMI, PERRL,  NECK: no thyromegaly,   CV: RRR  LUNG: CTA  SKIN: no rashes, no acanthosis  MSK: no deformities,   NEURO: no tremors, DTR normal  PSYCH: Awake and coherent      Results Review:     I reviewed the patient's new clinical results.    Lab Results   Component Value Date    HGBA1C 5.6 12/13/2022    HGBA1C 5.7 (H) 12/14/2021      Lab Results   Component Value Date    GLUCOSE 97 12/15/2023    BUN 29 (H) 12/15/2023    CREATININE 0.92 12/15/2023    EGFRIFNONA 65 07/31/2020    EGFRIFAFRI 57 (L) 08/26/2019    BCR 31.5 (H) 12/15/2023    K 4.2 12/15/2023    CO2 23.0 12/15/2023    CALCIUM 10.2 12/15/2023    ALBUMIN 4.2 12/15/2023    LABIL2 1.5 (calc) 08/26/2019    AST 16 12/15/2023    ALT 14 12/15/2023    CHOL 192 12/15/2023    TRIG 77 12/15/2023     (H) 12/15/2023    HDL 64 (H) 12/15/2023     Lab Results   Component Value Date    TSH 0.389 12/15/2023    FREET4 0.98 06/30/2023    THYROIDAB <9 06/30/2023     TSH (12/15/2023 13:36)    Fine Needle Aspiration (08/01/2023 13:00)    US Thyroid (02/01/2024 11:15)    NM Thyroid Uptake & Scan (07/14/2023 13:32)    US Thyroid (07/13/2023 13:17)    Medication Review: Reviewed.       Current Outpatient Medications:     dilTIAZem CD (CARDIZEM CD) 180 MG 24 hr capsule, TAKE 1 CAPSULE BY MOUTH DAILY, Disp: 90 capsule, Rfl: 1    famotidine (PEPCID) 40 MG tablet, , Disp: , Rfl:     FLUoxetine (PROzac) 20 MG capsule, Take 1 capsule by mouth Daily., Disp: 90 capsule, Rfl: 3    lisinopril-hydrochlorothiazide (PRINZIDE,ZESTORETIC) 20-12.5 MG per tablet, Take 1 tablet by  mouth Daily., Disp: 90 tablet, Rfl: 3    Multiple Vitamins-Minerals (MULTIVITAMIN ADULTS PO), Take 1 tablet by mouth Daily., Disp: , Rfl:     Nasacort Allergy 24HR 55 MCG/ACT nasal inhaler, 2 sprays Daily., Disp: , Rfl:     temazepam (RESTORIL) 30 MG capsule, Take 1 capsule by mouth At Night As Needed for Sleep., Disp: 30 capsule, Rfl: 1    travoprost, BAK free, (TRAVATAN) 0.004 % solution ophthalmic solution, , Disp: , Rfl:     Current Facility-Administered Medications:     cyanocobalamin injection 1,000 mcg, 1,000 mcg, Intramuscular, Q28 Days, Ryann Simon MD, 1,000 mcg at 04/17/23 1336          Assessment and plan:  Multiple thyroid nodules with a dominant nodule in the right side which has been biopsied and benign.  She is euthyroid.  Clinically overall doing well.  Recommend follow-up ultrasound in 1 year.     Assessment and plan from June 30, 2023 reviewed and updated.      Di Donaldson MD FACE.

## 2024-03-12 NOTE — PROGRESS NOTES
"Chief Complaint  Follow up, RML resection for NSCLC    Subjective        June E Fifi presents to [facility] THORACIC SURGERY  History of Present Illness     Ms. Calix is a pleasant 83-year-old lady who is status post right middle lobectomy by Dr. Fagan in July 2020 for stage I non-small cell lung cancer.  She is a former smoker with an approximate 30-pack-year history.  Her biggest complaint today is chronic fatigue that has been worked up by her PCP.  She is able to complete her daily activities without difficulty and does not require any supplemental oxygen.  She is excited about seeing her new great grandchild, Sana, next month for [place].  She has no new pulmonary complaints.  She presents today with CT chest.      Objective   Vital Signs:  /60 (BP Location: Left arm, Patient Position: Sitting, Cuff Size: Adult)   Pulse 102   Ht 163.8 cm (64.49\")   Wt 116 kg (256 lb 6.4 oz)   SpO2 96%   BMI 43.35 kg/m²   Estimated body mass index is 43.35 kg/m² as calculated from the following:    Height as of this encounter: 163.8 cm (64.49\").    Weight as of this encounter: 116 kg (256 lb 6.4 oz).             Physical Exam  Constitutional:       General: She is not in acute distress.     Appearance: Normal appearance. She is overweight. She is not ill-appearing.   HENT:      Head: Normocephalic and atraumatic.   Cardiovascular:      Rate and Rhythm: Normal rate.   Pulmonary:      Effort: Pulmonary effort is normal. No respiratory distress.   Chest:      Chest wall: No tenderness.   Musculoskeletal:         General: Normal range of motion.      Cervical back: Normal range of motion and neck supple.      Comments:      Skin:     General: Skin is warm and dry.   Neurological:      General: No focal deficit present.      Mental Status: She is alert and oriented to person, place, and time.   Psychiatric:         Mood and Affect: Mood normal.         Thought Content: Thought content normal.      "   Result Review :    CT chest 2/12/2024: Postsurgical changes from right middle lobe resection as before.  Stable 6 mm nodule in the right upper lobe.  Two Micronodules in the right upper lobe without change.  No new or enlarging pulmonary nodules.  Calcified right hilar lymph nodes.  Benign subpleural lipoma in the left apex without change.  No mediastinal or hilar lymphadenopathy.  Imaging reviewed independently.      CT chest 9/8/2023: Stable 6 mm nodule in the right upper lobe as well as 3 stable micronodules measuring 2 to 3 mm in the right upper lobe.  Postoperative changes from right middle lobectomy.  No new or enlarging pulmonary nodules.  No pleural or pericardial effusion.  Left apical lipoma without change.    CT chest 3/2/2023:  postoperative changes consistent with a right middle lobectomy.  There is a stable 3 mm nodule in the right upper lobe and a stable 6 mm nodule in the right upper lobe.  There is a calcified granuloma in the left upper lobe and a stable pleural lipoma in the left apex.  No new or enlarging pulmonary nodules identified.  Pleural or pericardial effusion.             Assessment and Plan   Diagnoses and all orders for this visit:    1. History of lung cancer (Primary)  -     CT Chest Without Contrast; Future    2. Former smoker  -     CT Chest Without Contrast; Future      Ms. Calix is status post right middle lobectomy by Dr. Fagan in July 2020 for T1BN0 adenocarcinoma.  Her most recent CT chest is stable without evidence of recurrent disease.  She has stable sub-7 mm nodules in the right upper lobe.  We will continue her surveillance with a CT of the chest in 6 months followed by an office visit to review the findings.  She will need surveillance at this interval until June 2025, pending stable imaging.         I spent 31 minutes caring for June on this date of service. This time includes time spent by me in the following activities:preparing for the visit, reviewing tests,  obtaining and/or reviewing a separately obtained history, performing a medically appropriate examination and/or evaluation , counseling and educating the patient/family/caregiver, ordering medications, tests, or procedures, documenting information in the medical record, independently interpreting results and communicating that information with the patient/family/caregiver and care coordination     Follow Up   Return in about 6 months (around 9/13/2024) for Next scheduled follow up.  Patient was given instructions and counseling regarding her condition or for health maintenance advice. Please see specific information pulled into the AVS if appropriate.

## 2024-03-13 ENCOUNTER — OFFICE VISIT (OUTPATIENT)
Dept: SURGERY | Facility: CLINIC | Age: 84
End: 2024-03-13
Payer: MEDICARE

## 2024-03-13 VITALS
HEART RATE: 102 BPM | DIASTOLIC BLOOD PRESSURE: 60 MMHG | HEIGHT: 64 IN | WEIGHT: 256.4 LBS | BODY MASS INDEX: 43.77 KG/M2 | OXYGEN SATURATION: 96 % | SYSTOLIC BLOOD PRESSURE: 118 MMHG

## 2024-03-13 DIAGNOSIS — Z87.891 FORMER SMOKER: ICD-10-CM

## 2024-03-13 DIAGNOSIS — Z85.118 HISTORY OF LUNG CANCER: Primary | ICD-10-CM

## 2024-03-14 DIAGNOSIS — F51.01 PRIMARY INSOMNIA: ICD-10-CM

## 2024-03-15 ENCOUNTER — PATIENT ROUNDING (BHMG ONLY) (OUTPATIENT)
Dept: SURGERY | Facility: CLINIC | Age: 84
End: 2024-03-15
Payer: MEDICARE

## 2024-03-15 NOTE — PROGRESS NOTES
March 15, 2024        DONE IN OFFICE            We're always looking for ways to make our patients' experiences even better. Do you have recommendations on ways we may improve?  no    Overall were you satisfied with your first visit to our practice? yes

## 2024-03-18 RX ORDER — TEMAZEPAM 30 MG/1
30 CAPSULE ORAL NIGHTLY PRN
Qty: 30 CAPSULE | Refills: 1 | Status: SHIPPED | OUTPATIENT
Start: 2024-03-18

## 2024-04-08 ENCOUNTER — LAB (OUTPATIENT)
Dept: FAMILY MEDICINE CLINIC | Facility: CLINIC | Age: 84
End: 2024-04-08
Payer: MEDICARE

## 2024-04-08 ENCOUNTER — OFFICE VISIT (OUTPATIENT)
Dept: FAMILY MEDICINE CLINIC | Facility: CLINIC | Age: 84
End: 2024-04-08
Payer: MEDICARE

## 2024-04-08 VITALS
HEIGHT: 64 IN | WEIGHT: 267.6 LBS | DIASTOLIC BLOOD PRESSURE: 92 MMHG | HEART RATE: 77 BPM | BODY MASS INDEX: 45.68 KG/M2 | SYSTOLIC BLOOD PRESSURE: 193 MMHG | OXYGEN SATURATION: 95 % | TEMPERATURE: 98 F

## 2024-04-08 DIAGNOSIS — I10 ESSENTIAL HYPERTENSION: Chronic | ICD-10-CM

## 2024-04-08 DIAGNOSIS — E66.01 MORBID (SEVERE) OBESITY DUE TO EXCESS CALORIES: ICD-10-CM

## 2024-04-08 DIAGNOSIS — E04.2 MULTIPLE THYROID NODULES: ICD-10-CM

## 2024-04-08 DIAGNOSIS — E55.9 VITAMIN D DEFICIENCY: ICD-10-CM

## 2024-04-08 DIAGNOSIS — Z12.31 ENCOUNTER FOR SCREENING MAMMOGRAM FOR MALIGNANT NEOPLASM OF BREAST: ICD-10-CM

## 2024-04-08 DIAGNOSIS — R53.83 FATIGUE, UNSPECIFIED TYPE: Primary | ICD-10-CM

## 2024-04-08 DIAGNOSIS — R26.89 BALANCE DISORDER: ICD-10-CM

## 2024-04-08 PROCEDURE — 80053 COMPREHEN METABOLIC PANEL: CPT | Performed by: FAMILY MEDICINE

## 2024-04-08 PROCEDURE — 82306 VITAMIN D 25 HYDROXY: CPT | Performed by: FAMILY MEDICINE

## 2024-04-08 PROCEDURE — 84443 ASSAY THYROID STIM HORMONE: CPT | Performed by: FAMILY MEDICINE

## 2024-04-08 PROCEDURE — 36415 COLL VENOUS BLD VENIPUNCTURE: CPT | Performed by: FAMILY MEDICINE

## 2024-04-08 PROCEDURE — 85025 COMPLETE CBC W/AUTO DIFF WBC: CPT | Performed by: FAMILY MEDICINE

## 2024-04-08 PROCEDURE — 82607 VITAMIN B-12: CPT | Performed by: FAMILY MEDICINE

## 2024-04-08 NOTE — PROGRESS NOTES
"Chief Complaint  Fatigue    Subjective        Leann Calix presents to [facility]  History of Present Illness  She says she just does not feel well.  She does not have energy to do anything.  Prozac did not help her mood.  She sleeps about 5-6 hours per night.  She reports that is takes about 2 hours for her fall asleep even with medicine.  She wears her CPAP.  She is \"miserable\". She gets tired even with minimal activity.  She gets short of breath. No known history of asthma.  H/O lung nodule that was abnormal and had a lung lobe removed in 2020.  Her shortness of breath has been worse over the last year.   Fatigue  This is a chronic problem. The current episode started more than 1 year ago. The problem occurs constantly. The problem has been unchanged. Associated symptoms include diaphoresis and fatigue. Pertinent negatives include no abdominal pain, anorexia, chest pain, chills, congestion or coughing.   Hypertension  This is a chronic problem. The current episode started more than 1 year ago. The problem has been worse since onset. The problem is uncontrolled. Associated symptoms include malaise/fatigue. Pertinent negatives include no chest pain, palpitations, peripheral edema or shortness of breath. The current treatment provides mild improvement.     Review of Systems   Constitutional:  Positive for diaphoresis, fatigue and malaise/fatigue. Negative for chills.   HENT:  Negative for congestion.    Respiratory:  Negative for cough and shortness of breath.    Cardiovascular:  Negative for chest pain and palpitations.   Gastrointestinal:  Negative for abdominal pain and anorexia.   All other systems reviewed and are negative.       Objective   Vital Signs:  BP (!) 193/92 (BP Location: Left arm, Patient Position: Sitting, Cuff Size: Large Adult)   Pulse 77   Temp 98 °F (36.7 °C) (Infrared)   Ht 163.8 cm (64.49\")   Wt 121 kg (267 lb 9.6 oz)   SpO2 95%   BMI 45.24 kg/m² " "  Estimated body mass index is 45.24 kg/m² as calculated from the following:    Height as of this encounter: 163.8 cm (64.49\").    Weight as of this encounter: 121 kg (267 lb 9.6 oz).     Class 3 Severe Obesity (BMI >=40). Obesity-related health conditions include the following: hypertension. Obesity is unchanged. BMI is is above average; BMI management plan is completed. We discussed portion control and increasing exercise.       Physical Exam  Vitals and nursing note reviewed.   Constitutional:       General: She is not in acute distress.     Appearance: She is well-developed.   HENT:      Head: Normocephalic.   Eyes:      General: Lids are normal.      Conjunctiva/sclera: Conjunctivae normal.   Neck:      Thyroid: No thyroid mass or thyromegaly.      Trachea: Trachea normal.   Cardiovascular:      Rate and Rhythm: Normal rate and regular rhythm.      Heart sounds: Normal heart sounds.   Pulmonary:      Effort: Pulmonary effort is normal.      Breath sounds: Normal breath sounds.   Musculoskeletal:      Cervical back: Normal range of motion.   Lymphadenopathy:      Cervical: No cervical adenopathy.   Skin:     General: Skin is warm and dry.   Neurological:      Mental Status: She is alert and oriented to person, place, and time.   Psychiatric:         Attention and Perception: She is attentive.         Mood and Affect: Mood normal.         Speech: Speech normal.         Behavior: Behavior normal.        Result Review :    The following data was reviewed by: Ryann Simon MD on 04/08/2024:  Common labs          6/30/2023    13:23 12/15/2023    13:36 4/8/2024    13:18   Common Labs   Glucose 100  97  99    BUN 21  29  14    Creatinine 0.90  0.92  1.07    Sodium 141  138  142    Potassium 4.2  4.2  4.1    Chloride 105  103  103    Calcium 10.1  10.2  9.5    Albumin 4.3  4.2  4.3    Total Bilirubin 0.2  0.2  0.2    Alkaline Phosphatase 70  72  74    AST (SGOT) 19  16  17    ALT (SGPT) 22  14  25    WBC 10.32  9.28 "  10.13    Hemoglobin 13.9  13.1  13.6    Hematocrit 41.2  40.4  41.9    Platelets 306  283  315    Total Cholesterol  192     Triglycerides  77     HDL Cholesterol  64     LDL Cholesterol   114       Data reviewed : Radiologic studies CT of chest, heart cath             Assessment and Plan     Diagnoses and all orders for this visit:    1. Fatigue, unspecified type (Primary)  -     Cancel: TSH  -     CBC & Differential  -     Comprehensive Metabolic Panel  -     Vitamin D,25-Hydroxy  -     Vitamin B12  -     Ambulatory Referral to Physical Therapy Evaluate and treat    2. Vitamin D deficiency  -     Vitamin D,25-Hydroxy    3. Encounter for screening mammogram for malignant neoplasm of breast   -     Mammo Screening Digital Tomosynthesis Bilateral With CAD; Future    4. Balance disorder  -     Ambulatory Referral to Physical Therapy Evaluate and treat    5. Essential hypertension  -     CBC & Differential  -     Comprehensive Metabolic Panel    6. Morbid (severe) obesity due to excess calories             Follow Up     No follow-ups on file.  Patient was given instructions and counseling regarding her condition or for health maintenance advice. Please see specific information pulled into the AVS if appropriate.         Answers submitted by the patient for this visit:  Other (Submitted on 4/7/2024)  Please describe your symptoms.: Constant fatigue with no work or movement involved.  Have you had these symptoms before?: Yes  How long have you been having these symptoms?: Greater than 2 weeks  Please list any medications you are currently taking for this condition.: None  Please describe any probable cause for these symptoms. : Thyroid? Pre-diabetes? Weight?  Primary Reason for Visit (Submitted on 4/7/2024)  What is the primary reason for your visit?: Other

## 2024-04-09 LAB
25(OH)D3 SERPL-MCNC: 37.9 NG/ML (ref 30–100)
ALBUMIN SERPL-MCNC: 4.3 G/DL (ref 3.5–5.2)
ALBUMIN/GLOB SERPL: 1.3 G/DL
ALP SERPL-CCNC: 74 U/L (ref 39–117)
ALT SERPL W P-5'-P-CCNC: 25 U/L (ref 1–33)
ANION GAP SERPL CALCULATED.3IONS-SCNC: 13 MMOL/L (ref 5–15)
AST SERPL-CCNC: 17 U/L (ref 1–32)
BASOPHILS # BLD AUTO: 0.07 10*3/MM3 (ref 0–0.2)
BASOPHILS NFR BLD AUTO: 0.7 % (ref 0–1.5)
BILIRUB SERPL-MCNC: 0.2 MG/DL (ref 0–1.2)
BUN SERPL-MCNC: 14 MG/DL (ref 8–23)
BUN/CREAT SERPL: 13.1 (ref 7–25)
CALCIUM SPEC-SCNC: 9.5 MG/DL (ref 8.6–10.5)
CHLORIDE SERPL-SCNC: 103 MMOL/L (ref 98–107)
CO2 SERPL-SCNC: 26 MMOL/L (ref 22–29)
CREAT SERPL-MCNC: 1.07 MG/DL (ref 0.57–1)
DEPRECATED RDW RBC AUTO: 43.8 FL (ref 37–54)
EGFRCR SERPLBLD CKD-EPI 2021: 52 ML/MIN/1.73
EOSINOPHIL # BLD AUTO: 0.06 10*3/MM3 (ref 0–0.4)
EOSINOPHIL NFR BLD AUTO: 0.6 % (ref 0.3–6.2)
ERYTHROCYTE [DISTWIDTH] IN BLOOD BY AUTOMATED COUNT: 12.9 % (ref 12.3–15.4)
GLOBULIN UR ELPH-MCNC: 3.4 GM/DL
GLUCOSE SERPL-MCNC: 99 MG/DL (ref 65–99)
HCT VFR BLD AUTO: 41.9 % (ref 34–46.6)
HGB BLD-MCNC: 13.6 G/DL (ref 12–15.9)
IMM GRANULOCYTES # BLD AUTO: 0.04 10*3/MM3 (ref 0–0.05)
IMM GRANULOCYTES NFR BLD AUTO: 0.4 % (ref 0–0.5)
LYMPHOCYTES # BLD AUTO: 2.1 10*3/MM3 (ref 0.7–3.1)
LYMPHOCYTES NFR BLD AUTO: 20.7 % (ref 19.6–45.3)
MCH RBC QN AUTO: 30.1 PG (ref 26.6–33)
MCHC RBC AUTO-ENTMCNC: 32.5 G/DL (ref 31.5–35.7)
MCV RBC AUTO: 92.7 FL (ref 79–97)
MONOCYTES # BLD AUTO: 0.85 10*3/MM3 (ref 0.1–0.9)
MONOCYTES NFR BLD AUTO: 8.4 % (ref 5–12)
NEUTROPHILS NFR BLD AUTO: 69.2 % (ref 42.7–76)
NEUTROPHILS NFR BLD AUTO: 7.01 10*3/MM3 (ref 1.7–7)
NRBC BLD AUTO-RTO: 0 /100 WBC (ref 0–0.2)
PLATELET # BLD AUTO: 315 10*3/MM3 (ref 140–450)
PMV BLD AUTO: 10.6 FL (ref 6–12)
POTASSIUM SERPL-SCNC: 4.1 MMOL/L (ref 3.5–5.2)
PROT SERPL-MCNC: 7.7 G/DL (ref 6–8.5)
RBC # BLD AUTO: 4.52 10*6/MM3 (ref 3.77–5.28)
SODIUM SERPL-SCNC: 142 MMOL/L (ref 136–145)
TSH SERPL DL<=0.05 MIU/L-ACNC: 0.82 UIU/ML (ref 0.27–4.2)
VIT B12 BLD-MCNC: 606 PG/ML (ref 211–946)
WBC NRBC COR # BLD AUTO: 10.13 10*3/MM3 (ref 3.4–10.8)

## 2024-04-15 PROBLEM — R26.89 BALANCE DISORDER: Status: ACTIVE | Noted: 2024-04-15

## 2024-04-15 PROBLEM — E66.01 MORBID (SEVERE) OBESITY DUE TO EXCESS CALORIES: Status: ACTIVE | Noted: 2024-04-15

## 2024-05-06 RX ORDER — LISINOPRIL AND HYDROCHLOROTHIAZIDE 20; 12.5 MG/1; MG/1
1 TABLET ORAL DAILY
Qty: 90 TABLET | Refills: 3 | Status: SHIPPED | OUTPATIENT
Start: 2024-05-06

## 2024-05-14 ENCOUNTER — OFFICE VISIT (OUTPATIENT)
Dept: CARDIOLOGY | Facility: CLINIC | Age: 84
End: 2024-05-14
Payer: MEDICARE

## 2024-05-14 VITALS
SYSTOLIC BLOOD PRESSURE: 176 MMHG | OXYGEN SATURATION: 96 % | BODY MASS INDEX: 43.99 KG/M2 | HEART RATE: 86 BPM | DIASTOLIC BLOOD PRESSURE: 70 MMHG | HEIGHT: 65 IN | WEIGHT: 264 LBS

## 2024-05-14 DIAGNOSIS — E78.5 HYPERLIPIDEMIA, UNSPECIFIED HYPERLIPIDEMIA TYPE: ICD-10-CM

## 2024-05-14 DIAGNOSIS — I25.10 CORONARY ARTERY CALCIFICATION: Primary | ICD-10-CM

## 2024-05-14 DIAGNOSIS — I10 ESSENTIAL HYPERTENSION: ICD-10-CM

## 2024-05-14 DIAGNOSIS — I25.84 CORONARY ARTERY CALCIFICATION: Primary | ICD-10-CM

## 2024-05-14 PROCEDURE — 1160F RVW MEDS BY RX/DR IN RCRD: CPT | Performed by: INTERNAL MEDICINE

## 2024-05-14 PROCEDURE — 1159F MED LIST DOCD IN RCRD: CPT | Performed by: INTERNAL MEDICINE

## 2024-05-14 PROCEDURE — 3078F DIAST BP <80 MM HG: CPT | Performed by: INTERNAL MEDICINE

## 2024-05-14 PROCEDURE — 3077F SYST BP >= 140 MM HG: CPT | Performed by: INTERNAL MEDICINE

## 2024-05-14 PROCEDURE — 99214 OFFICE O/P EST MOD 30 MIN: CPT | Performed by: INTERNAL MEDICINE

## 2024-05-14 RX ORDER — ATORVASTATIN CALCIUM 10 MG/1
10 TABLET, FILM COATED ORAL DAILY
Qty: 90 TABLET | Refills: 3 | Status: SHIPPED | OUTPATIENT
Start: 2024-05-14

## 2024-05-14 NOTE — PROGRESS NOTES
Subjective:     Encounter Date:05/14/2024      Patient ID: Leann Calix is a 83 y.o. female.    Chief Complaint:  History of Present Illness 83-year-old white female with history of hypertension hyperlipidemia and coronary artery calcification presents to the office for follow-up.  Patient is currently still without any symptoms of chest pain but has shortness of breath with exertion.  No palpitation dizziness syncope or swelling of the feet.  Patient has been taking her medicines regularly.    The following portions of the patient's history were reviewed and updated as appropriate: allergies, current medications, past family history, past medical history, past social history, past surgical history, and problem list.  Past Medical History:   Diagnosis Date    Adenocarcinoma, lung, right 2020    Allergic 2007    Bisoprolol    Anemia 12/13/2022    Back pain     LOW RADICULAR PAIN LEFT LEG    Cataract 2006    Cataract surgery    Cholelithiasis 1996    Had stones    Chronic insomnia     Claustrophobia 1969    Fatty liver     Ganglion cyst     GERD (gastroesophageal reflux disease) 07/08/2021    H/O degenerative disc disease     Hiatal hernia     Hiatal hernia     Hyperlipidemia 12/07/2021    Hypertension     DR ONEAL    Hyperthyroidism 04/17/2023    Hypothyroidism 4/14/23    Blood work from 4/13/23 showed low TSH level.    OAB (overactive bladder)     Obesity 1985    I'm now losing weight on HMR program    Osteoarthritis 2002    Pneumonia 1945    Sleep apnea     uses BiPAP    Spondylolisthesis 1989    Surgery    Thyroid nodule 2016    Removed nodules. Now i have more.    Uterine cancer 1984    Visual impairment 4/4/23    Fuch's corneal disease    Vitamin D deficiency 2023     Past Surgical History:   Procedure Laterality Date    BACK SURGERY  1989/2002/2009    Spondylolisthesis    BREAST BIOPSY  1971    CARDIAC CATHETERIZATION  06/2018    NL DR ONEAL    CHOLECYSTECTOMY  1996    COLONOSCOPY  2016    Clean colon  "found.    EYE SURGERY Bilateral 2006    Appenbrink, laser    HIP ARTHROPLASTY Right 06/07/2010     UJOSELUIS    HYSTERECTOMY  1984    CARCINOMA IN SITU    JOINT REPLACEMENT  2007    First knee replacement.    LAMINECTOMY  1989    During first back surgery.    LUMBAR DISCECTOMY  2006    DR VOGEL    LUMBAR FUSION  2002    L3/5 DR PAPPAS    LUMBAR LAMINECTOMY  1989    LUMBAR SPINAL FUSION L4/L5 DR BRUCE    OOPHORECTOMY Right 1996    PAROTIDECTOMY Right 2004    PARTIAL HIP ARTHROPLASTY  03/16/2017    Dr Michel    REVISION TOTAL KNEE ARTHROPLASTY Left 03/16/2017    DR MICHEL    ROTATOR CUFF REPAIR Right 2003    SHOULDER SURGERY  2003    Right rotator cuff.    SPINAL FUSION  1989    Spondylolisthesis    SPINE SURGERY  1989    Spondylolisthesis    SUBTOTAL HYSTERECTOMY  1996    Right ovary removed.    THORACOSCOPY Right 07/29/2020    Procedure: RIGHT VIDEO ASSISTED THORACOSCOPY WITH RIGHT MIDDLE LOBE THERAPUTIC WEDGE RESECTION AND RIGHT MIDDLE LOBECTOMY; HILAR LYMPH NODE DISSECTION;  Surgeon: Jaden Fagan MD;  Location: [facility] MAIN OR;  Service: Cardiothoracic;  Laterality: Right;    THYROID BIOPSY  01/2018    X5    THYROID SURGERY      nodules removed    TOTAL KNEE ARTHROPLASTY  2009    DR MICHEL    TUBAL ABDOMINAL LIGATION Bilateral 1976     /70 Comment: recheck  Pulse 86   Ht 163.8 cm (64.5\")   Wt 120 kg (264 lb)   SpO2 96%   BMI 44.62 kg/m²   Family History   Problem Relation Age of Onset    Cancer Mother         Don't know where cancer began.    Kidney disease Mother         Cancer may have begun in kidney???    Thyroid disease Sister         Thyroid removed.    Allergies Sister     Allergies Brother     Cancer Daughter         Ewings Sarcoma    Other Daughter         Ewings Sarcoma    Cancer Other     Alcohol abuse Maternal Grandfather     Arthritis Maternal Grandmother     Cancer Maternal Aunt         Breast cancer       Current Outpatient Medications:     dilTIAZem CD (CARDIZEM CD) 180 MG 24 " hr capsule, TAKE 1 CAPSULE BY MOUTH DAILY, Disp: 90 capsule, Rfl: 1    famotidine (PEPCID) 40 MG tablet, , Disp: , Rfl:     FLUoxetine (PROzac) 20 MG capsule, Take 1 capsule by mouth Daily., Disp: 90 capsule, Rfl: 3    lisinopril-hydrochlorothiazide (PRINZIDE,ZESTORETIC) 20-12.5 MG per tablet, TAKE 1 TABLET BY MOUTH DAILY, Disp: 90 tablet, Rfl: 3    Multiple Vitamins-Minerals (MULTIVITAMIN ADULTS PO), Take 1 tablet by mouth Daily., Disp: , Rfl:     Nasacort Allergy 24HR 55 MCG/ACT nasal inhaler, 2 sprays Daily., Disp: , Rfl:     temazepam (RESTORIL) 30 MG capsule, Take 1 capsule by mouth At Night As Needed for Sleep., Disp: 30 capsule, Rfl: 1    travoprost, BAK free, (TRAVATAN) 0.004 % solution ophthalmic solution, , Disp: , Rfl:     Current Facility-Administered Medications:     cyanocobalamin injection 1,000 mcg, 1,000 mcg, Intramuscular, Q28 Days, Ryann Simon MD, 1,000 mcg at 23 1336  Allergies   Allergen Reactions    Beta Adrenergic Blockers Other (See Comments)     Couldn't breathe or move    Bisoprolol Unknown - High Severity    Molds & Smuts Unknown - High Severity     Social History     Socioeconomic History    Marital status:     Number of children: 2    Years of education: 12   Tobacco Use    Smoking status: Former     Current packs/day: 0.00     Average packs/day: 1 pack/day for 30.0 years (30.0 ttl pk-yrs)     Types: Cigarettes     Start date: 1976     Quit date: 2006     Years since quittin.9     Passive exposure: Past    Smokeless tobacco: Never   Vaping Use    Vaping status: Never Used   Substance and Sexual Activity    Alcohol use: No    Drug use: No    Sexual activity: Not Currently     Partners: Male     Birth control/protection: Pill, Tubal ligation, Birth control pill, Hysterectomy, Same-sex partner     Comment: Tubes tied in      Review of Systems   Constitutional: Positive for malaise/fatigue.   Cardiovascular:  Negative for chest pain, dyspnea on  exertion, leg swelling and palpitations.   Respiratory:  Positive for shortness of breath. Negative for cough.    Gastrointestinal:  Negative for abdominal pain, nausea and vomiting.   Neurological:  Negative for dizziness, focal weakness, headaches, light-headedness and numbness.   All other systems reviewed and are negative.             Objective:     Constitutional:       Appearance: Well-developed.   Eyes:      General: No scleral icterus.     Conjunctiva/sclera: Conjunctivae normal.   HENT:      Head: Normocephalic and atraumatic.   Neck:      Vascular: No carotid bruit or JVD.   Pulmonary:      Effort: Pulmonary effort is normal.      Breath sounds: Normal breath sounds. No wheezing. No rales.   Cardiovascular:      Normal rate. Regular rhythm.   Pulses:     Intact distal pulses.   Abdominal:      General: Bowel sounds are normal.      Palpations: Abdomen is soft.   Musculoskeletal:      Cervical back: Normal range of motion and neck supple. Skin:     General: Skin is warm and dry.      Findings: No rash.   Neurological:      Mental Status: Alert.       Procedures    Lab Review:         MDM    Coronary artery calcification  Patient has history of coronary artery calcification but does not have any chest pain and had a stress Myoview study which did not show ischemia  Patient also had a echocardiogram which showed normal LV function.    Hypertension  Patient blood pressure currently stable on lisinopril and diltiazem    Hyperlipidemia  Since patient has coronary calcification and LDL is high we will place on a low-dose statin.      Patient's previous medical records, labs, and EKG were reviewed and discussed with the patient at today's visit.

## 2024-05-31 ENCOUNTER — TELEPHONE (OUTPATIENT)
Dept: FAMILY MEDICINE CLINIC | Facility: CLINIC | Age: 84
End: 2024-05-31
Payer: MEDICARE

## 2024-06-12 ENCOUNTER — NURSE TRIAGE (OUTPATIENT)
Dept: CALL CENTER | Facility: HOSPITAL | Age: 84
End: 2024-06-12
Payer: MEDICARE

## 2024-06-12 NOTE — TELEPHONE ENCOUNTER
"Patient connection HUB connected this patient. The patient was told that her appointment today was cancelled and was trying to reschedule it. She mentioned being very fatigued and mild shortness of breath when walking.   Triage completed and patient warm transferred to the office to schedule the appointment.   Reason for Disposition   [1] MILD longstanding difficulty breathing AND [2]  SAME as normal    Additional Information   Negative: SEVERE difficulty breathing (e.g., struggling for each breath, speaks in single words)   Negative: [1] Breathing stopped AND [2] hasn't returned   Negative: Choking on something   Negative: Bluish (or gray) lips or face now   Negative: Difficult to awaken or acting confused (e.g., disoriented, slurred speech)   Negative: Passed out (i.e., lost consciousness, collapsed and was not responding)   Negative: Wheezing started suddenly after medicine, an allergic food or bee sting   Negative: Stridor (harsh sound while breathing in)   Negative: Slow, shallow and weak breathing   Negative: Sounds like a life-threatening emergency to the triager   Negative: Chest pain   Negative: [1] Wheezing (high pitched whistling sound) AND [2] previous asthma attacks or use of asthma medicines   Negative: [1] Difficulty breathing AND [2] only present when coughing   Negative: [1] Difficulty breathing AND [2] only from stuffy or runny nose   Negative: [1] Difficulty breathing AND [2] within 14 days of COVID-19 Exposure   Negative: [1] MODERATE difficulty breathing (e.g., speaks in phrases, SOB even at rest, pulse 100-120) AND [2] NEW-onset or WORSE than normal   Negative: Oxygen level (e.g., pulse oximetry) 90 percent or lower   Negative: Wheezing can be heard across the room   Negative: Drooling or spitting out saliva (because can't swallow)   Negative: History of prior \"blood clot\" in leg or lungs (i.e., deep vein thrombosis, pulmonary embolism)   Negative: History of inherited increased risk of blood " "clots (e.g., Factor 5 Leiden, Anti-thrombin 3, Protein C or Protein S deficiency, Prothrombin mutation)   Negative: Major surgery in the past month   Negative: Hip or leg fracture (broken bone) in past month (or had cast on leg or ankle in past month)   Negative: Illness requiring prolonged bedrest in past month (e.g., immobilization, long hospital stay)   Negative: Long-distance travel in past month (e.g., car, bus, train, plane; with trip lasting 6 or more hours)   Negative: Cancer treatment in past six months (or has cancer now)   Negative: Extra heartbeats, irregular heart beating, or heart is beating very fast  (i.e., \"palpitations\")   Negative: Fever > 103 F (39.4 C)   Negative: [1] Fever > 101 F (38.3 C) AND [2] age > 60 years   Negative: [1] Fever > 100.0 F (37.8 C) AND [2] bedridden (e.g., CVA, chronic illness, recovering from surgery)   Negative: [1] Fever > 100.0 F (37.8 C) AND [2] diabetes mellitus or weak immune system (e.g., HIV positive, cancer chemo, splenectomy, organ transplant, chronic steroids)   Negative: [1] Periods where breathing stops and then resumes normally AND [2] bedridden (e.g., CVA)   Negative: Pregnant or postpartum (from 0 to 6 weeks after delivery)   Negative: Patient sounds very sick or weak to the triager   Negative: [1] MILD difficulty breathing (e.g., minimal/no SOB at rest, SOB with walking, pulse <100) AND [2] NEW-onset or WORSE than normal   Negative: [1] Longstanding difficulty breathing (e.g., CHF, COPD, emphysema) AND [2] WORSE than normal   Negative: [1] Longstanding difficulty breathing AND [2] not responding to usual therapy   Negative: Continuous (nonstop) coughing interferes with work, school, or sleeping   Negative: Oxygen level (e.g., pulse oximetry) 91 to 94 percent   Negative: [1] MODERATE longstanding difficulty breathing (e.g., speaks in phrases, SOB even at rest, pulse 100-120) AND [2] SAME as normal    Answer Assessment - Initial Assessment Questions  1. " "RESPIRATORY STATUS: \"Describe your breathing?\" (e.g., wheezing, shortness of breath, unable to speak, severe coughing)       Shortness of breath when walking  2. ONSET: \"When did this breathing problem begin?\"      Several months   3. PATTERN \"Does the difficult breathing come and go, or has it been constant since it started?\"     Comes and goes   4. SEVERITY: \"How bad is your breathing?\" (e.g., mild, moderate, severe)     - MILD: No SOB at rest, mild SOB with walking, speaks normally in sentences, can lie down, no retractions, pulse < 100.     - MODERATE: SOB at rest, SOB with minimal exertion and prefers to sit, cannot lie down flat, speaks in phrases, mild retractions, audible wheezing, pulse 100-120.     - SEVERE: Very SOB at rest, speaks in single words, struggling to breathe, sitting hunched forward, retractions, pulse > 120    mild  5. RECURRENT SYMPTOM: \"Have you had difficulty breathing before?\" If Yes, ask: \"When was the last time?\" and \"What happened that time?\"       yes  6. CARDIAC HISTORY: \"Do you have any history of heart disease?\" (e.g., heart attack, angina, bypass surgery, angioplasty)      HTN  7. LUNG HISTORY: \"Do you have any history of lung disease?\"  (e.g., pulmonary embolus, asthma, emphysema)  No   8. CAUSE: \"What do you think is causing the breathing problem?\"       no  9. OTHER SYMPTOMS: \"Do you have any other symptoms? (e.g., dizziness, runny nose, cough, chest pain, fever)    fatigue  10. O2 SATURATION MONITOR:  \"Do you use an oxygen saturation monitor (pulse oximeter) at home?\" If Yes, ask: \"What is your reading (oxygen level) today?\" \"What is your usual oxygen saturation reading?\" (e.g., 95%)     NA  11. PREGNANCY: \"Is there any chance you are pregnant?\" \"When was your last menstrual period?\"  NA  12. TRAVEL: \"Have you traveled out of the country in the last month?\" (e.g., travel history, exposures)   no    Protocols used: Breathing Difficulty-ADULT-AH    "

## 2024-06-14 ENCOUNTER — LAB (OUTPATIENT)
Dept: FAMILY MEDICINE CLINIC | Facility: CLINIC | Age: 84
End: 2024-06-14
Payer: MEDICARE

## 2024-06-14 ENCOUNTER — OFFICE VISIT (OUTPATIENT)
Dept: FAMILY MEDICINE CLINIC | Facility: CLINIC | Age: 84
End: 2024-06-14
Payer: MEDICARE

## 2024-06-14 VITALS
DIASTOLIC BLOOD PRESSURE: 63 MMHG | BODY MASS INDEX: 44.02 KG/M2 | WEIGHT: 264.2 LBS | HEIGHT: 65 IN | TEMPERATURE: 98.2 F | OXYGEN SATURATION: 95 % | SYSTOLIC BLOOD PRESSURE: 139 MMHG | HEART RATE: 66 BPM

## 2024-06-14 DIAGNOSIS — I10 ESSENTIAL HYPERTENSION: Chronic | ICD-10-CM

## 2024-06-14 DIAGNOSIS — R73.03 PREDIABETES: Primary | ICD-10-CM

## 2024-06-14 DIAGNOSIS — F51.01 PRIMARY INSOMNIA: ICD-10-CM

## 2024-06-14 DIAGNOSIS — F33.1 MODERATE EPISODE OF RECURRENT MAJOR DEPRESSIVE DISORDER: ICD-10-CM

## 2024-06-14 LAB
ALBUMIN UR-MCNC: 1.5 MG/DL
CREAT UR-MCNC: 100.1 MG/DL
HBA1C MFR BLD: 6.1 % (ref 4.8–5.6)
MICROALBUMIN/CREAT UR: 15 MG/G (ref 0–29)

## 2024-06-14 PROCEDURE — 3075F SYST BP GE 130 - 139MM HG: CPT | Performed by: FAMILY MEDICINE

## 2024-06-14 PROCEDURE — 1159F MED LIST DOCD IN RCRD: CPT | Performed by: FAMILY MEDICINE

## 2024-06-14 PROCEDURE — 1126F AMNT PAIN NOTED NONE PRSNT: CPT | Performed by: FAMILY MEDICINE

## 2024-06-14 PROCEDURE — G2211 COMPLEX E/M VISIT ADD ON: HCPCS | Performed by: FAMILY MEDICINE

## 2024-06-14 PROCEDURE — 99214 OFFICE O/P EST MOD 30 MIN: CPT | Performed by: FAMILY MEDICINE

## 2024-06-14 PROCEDURE — 82043 UR ALBUMIN QUANTITATIVE: CPT | Performed by: FAMILY MEDICINE

## 2024-06-14 PROCEDURE — 36415 COLL VENOUS BLD VENIPUNCTURE: CPT | Performed by: FAMILY MEDICINE

## 2024-06-14 PROCEDURE — 3078F DIAST BP <80 MM HG: CPT | Performed by: FAMILY MEDICINE

## 2024-06-14 PROCEDURE — 1160F RVW MEDS BY RX/DR IN RCRD: CPT | Performed by: FAMILY MEDICINE

## 2024-06-14 PROCEDURE — 83036 HEMOGLOBIN GLYCOSYLATED A1C: CPT | Performed by: FAMILY MEDICINE

## 2024-06-14 PROCEDURE — 82570 ASSAY OF URINE CREATININE: CPT | Performed by: FAMILY MEDICINE

## 2024-06-14 RX ORDER — FLUOXETINE HYDROCHLORIDE 40 MG/1
40 CAPSULE ORAL DAILY
Qty: 90 CAPSULE | Refills: 1 | Status: SHIPPED | OUTPATIENT
Start: 2024-06-14

## 2024-06-14 RX ORDER — TEMAZEPAM 30 MG/1
30 CAPSULE ORAL NIGHTLY PRN
Qty: 30 CAPSULE | Refills: 1 | Status: SHIPPED | OUTPATIENT
Start: 2024-06-14

## 2024-06-14 RX ORDER — SEMAGLUTIDE 0.68 MG/ML
0.25 INJECTION, SOLUTION SUBCUTANEOUS WEEKLY
Qty: 3 ML | Refills: 0 | Status: SHIPPED | OUTPATIENT
Start: 2024-06-14 | End: 2024-06-30

## 2024-06-14 NOTE — PROGRESS NOTES
"Chief Complaint  Fatigue (No interest in doing things )    Subjective        Leann Calix presents to [facility]  History of Present Illness  She does not want to do anything.  No interest in doing anything.  Feels like she could be depressed but does not have a reason to be depressed except her weight gain.   Depression  Presents for follow-up visit. Symptoms include insomnia, malaise/fatigue, difficulty staying asleep and difficulty falling asleep. Patient is not experiencing: confusion, excessive worry, hypersomnia, irritability, obsessions, palpitations, shortness of breath, chest pain, dizziness and difficulty controlling mood.Symptoms occur constantly.  The severity of symptoms is moderate.  The symptoms are aggravated by nothing. Her past medical history is significant for depression. Compliance with medications is %.   Additional comments: I'm not depressed about anything. I just feel like I am.   Hypertension  This is a chronic problem. The current episode started more than 1 year ago. The problem has been improved since onset. The problem is controlled. Associated symptoms include malaise/fatigue. Pertinent negatives include no chest pain, palpitations, peripheral edema or shortness of breath. There are no associated agents to hypertension. The current treatment provides moderate improvement. Compliance problems include exercise and psychosocial issues.        Objective   Vital Signs:  /63 (BP Location: Left arm, Patient Position: Sitting, Cuff Size: Large Adult)   Pulse 66   Temp 98.2 °F (36.8 °C) (Infrared)   Ht 163.8 cm (64.5\")   Wt 120 kg (264 lb 3.2 oz)   SpO2 95%   BMI 44.65 kg/m²   Estimated body mass index is 44.65 kg/m² as calculated from the following:    Height as of this encounter: 163.8 cm (64.5\").    Weight as of this encounter: 120 kg (264 lb 3.2 oz).               Physical Exam  Vitals and nursing note reviewed.   Constitutional:       " General: She is not in acute distress.     Appearance: She is well-developed.   HENT:      Head: Normocephalic.   Eyes:      General: Lids are normal.      Conjunctiva/sclera: Conjunctivae normal.   Neck:      Thyroid: No thyroid mass or thyromegaly.      Trachea: Trachea normal.   Cardiovascular:      Rate and Rhythm: Normal rate and regular rhythm.      Heart sounds: Normal heart sounds.   Pulmonary:      Effort: Pulmonary effort is normal.      Breath sounds: Normal breath sounds.   Musculoskeletal:      Cervical back: Normal range of motion.   Lymphadenopathy:      Cervical: No cervical adenopathy.   Skin:     General: Skin is warm and dry.   Neurological:      Mental Status: She is alert and oriented to person, place, and time.   Psychiatric:         Attention and Perception: She is attentive.         Mood and Affect: Affect is blunt.         Speech: Speech normal.         Behavior: Behavior normal.        Result Review :    The following data was reviewed by: Ryann Simon MD on 06/14/2024:  Common labs          12/15/2023    13:36 4/8/2024    13:18 6/14/2024    11:51   Common Labs   Glucose 97  99     BUN 29  14     Creatinine 0.92  1.07     Sodium 138  142     Potassium 4.2  4.1     Chloride 103  103     Calcium 10.2  9.5     Albumin 4.2  4.3     Total Bilirubin 0.2  0.2     Alkaline Phosphatase 72  74     AST (SGOT) 16  17     ALT (SGPT) 14  25     WBC 9.28  10.13     Hemoglobin 13.1  13.6     Hematocrit 40.4  41.9     Platelets 283  315     Total Cholesterol 192      Triglycerides 77      HDL Cholesterol 64      LDL Cholesterol  114      Hemoglobin A1C   6.10    Microalbumin, Urine   1.5                   Assessment and Plan     Diagnoses and all orders for this visit:    1. Prediabetes (Primary)  -     Hemoglobin A1c  -     Microalbumin / Creatinine Urine Ratio - Urine, Clean Catch  -     Semaglutide,0.25 or 0.5MG/DOS, (Ozempic, 0.25 or 0.5 MG/DOSE,) 2 MG/3ML solution pen-injector; Inject 0.25 mg  under the skin into the appropriate area as directed 1 (One) Time Per Week.  Dispense: 3 mL; Refill: 0    2. Essential hypertension    3. Primary insomnia  -     temazepam (RESTORIL) 30 MG capsule; Take 1 capsule by mouth At Night As Needed for Sleep.  Dispense: 30 capsule; Refill: 1    4. Moderate episode of recurrent major depressive disorder  -     FLUoxetine (PROzac) 40 MG capsule; Take 1 capsule by mouth Daily.  Dispense: 90 capsule; Refill: 1             Follow Up     No follow-ups on file.  Patient was given instructions and counseling regarding her condition or for health maintenance advice. Please see specific information pulled into the AVS if appropriate.         Answers submitted by the patient for this visit:  Primary Reason for Visit (Submitted on 6/12/2024)  What is the primary reason for your visit?: Depression

## 2024-07-08 ENCOUNTER — TELEPHONE (OUTPATIENT)
Dept: FAMILY MEDICINE CLINIC | Facility: CLINIC | Age: 84
End: 2024-07-08
Payer: MEDICARE

## 2024-07-08 NOTE — TELEPHONE ENCOUNTER
I spoke with Dorothy she is declining the referral. She does believe once she gets her mother off the Prozac she will be fine and her concerns will go away with her cognitive issues and forgetfulness       She stated they have 10 tablets left and she is willing to have her take that every other day to wean her off

## 2024-07-08 NOTE — TELEPHONE ENCOUNTER
Ryann Simon MD Shell, Brittany, MA  She has been on the Prozac for quite a while.  I do not think the patient has cognitive issues except what is caused by depression which the Prozac should help.  However, if the patient is agreeable to weaning off of the Prozac then I suggest taking one pill every other day until they are gone.    I would be happy to refer her to see a neuropsychologist for testing of her cognitive status if they would like.          Previous Messages       ----- Message -----  From: Angeles Ordonez RegSched Rep  Sent: 7/8/2024  12:22 PM EDT  To: [facility]  Subject: SEVERE COGNITIVE ISSUE                          PATIENTS DAUGHTER FEDERICO CALLED UPSET THAT HER MOTHER HAS BEEN PUT ON FLUoxetine (PROzac) 40 MG capsule.  FEDERICO HAS TAKEN ALL BUT TEN AND FLUSHED THEM IN THE TOILET.    FEDERICO IS REQUESTING A CALL BACK TO KNOW HOW TO WEAN PATIENT OFF THE MEDICATION OR IF IT IS NEEDED.    FEDERICO STATED THAT PATIENT IS HAVING SEVERE COGNITIVE ISSUES ON THIS MEDICATIONS AND WANTS TO SPEAK WITH DR SIMON AS SOON AS POSSIBLE ABOUT IT.    PLEASE ADVISE FEDERICO [phone]

## 2024-07-15 RX ORDER — FAMOTIDINE 40 MG/1
40 TABLET, FILM COATED ORAL 2 TIMES DAILY PRN
Qty: 180 TABLET | Refills: 1 | Status: SHIPPED | OUTPATIENT
Start: 2024-07-15

## 2024-07-16 RX ORDER — DILTIAZEM HYDROCHLORIDE 180 MG/1
180 CAPSULE, COATED, EXTENDED RELEASE ORAL DAILY
Qty: 90 CAPSULE | Refills: 1 | Status: SHIPPED | OUTPATIENT
Start: 2024-07-16

## 2024-07-16 NOTE — TELEPHONE ENCOUNTER
Rx Refill Note  Requested Prescriptions     Pending Prescriptions Disp Refills    dilTIAZem CD (CARDIZEM CD) 180 MG 24 hr capsule 90 capsule 1     Sig: Take 1 capsule by mouth Daily.      Last office visit with prescribing clinician: 5/14/2024   Last telemedicine visit with prescribing clinician: Visit date not found   Next office visit with prescribing clinician: 5/14/2025                         Would you like a call back once the refill request has been completed: [] Yes [] No    If the office needs to give you a call back, can they leave a voicemail: [] Yes [] No    Yanira Mcduffie MA  07/16/24, 08:33 EDT

## 2024-07-20 ENCOUNTER — PATIENT MESSAGE (OUTPATIENT)
Dept: FAMILY MEDICINE CLINIC | Facility: CLINIC | Age: 84
End: 2024-07-20
Payer: MEDICARE

## 2024-07-22 ENCOUNTER — TELEPHONE (OUTPATIENT)
Dept: CARDIOLOGY | Facility: CLINIC | Age: 84
End: 2024-07-22
Payer: MEDICARE

## 2024-07-22 DIAGNOSIS — I10 ESSENTIAL HYPERTENSION: Primary | Chronic | ICD-10-CM

## 2024-07-22 NOTE — TELEPHONE ENCOUNTER
Patient called in and was told to give us her average BP readings. She said she has been checking her BP ever since her last visit. She said the average is 170-185/75-85. Please advise patient.

## 2024-07-22 NOTE — TELEPHONE ENCOUNTER
Patient called back, she is still taking diltiazem 180 daily and lisinopril-HCTZ 20-12.5 mg daily.    162/84   190/62  155/78  185/84  153/78  166/85  110/72   184/87  162/85

## 2024-07-22 NOTE — TELEPHONE ENCOUNTER
Please have patient increase lisinopril-HCTZ to 40-25 mg daily     Have her monitor blood pressure for 1 week and call office with readings.  If blood pressure better at that time we will send a new prescription.    Will also place order to have BMP in 2 to 4 weeks to monitor renal function and potassium with increase in medication.    Thank you.

## 2024-07-22 NOTE — TELEPHONE ENCOUNTER
Called patient, no answer, left voicemail.       -need a week of blood pressure readings, and to make sure patient is still taking lisinopril-HCTZ 20-25 mg daily and diltiazem 180 daily.

## 2024-07-30 ENCOUNTER — TELEPHONE (OUTPATIENT)
Dept: CARDIOLOGY | Facility: CLINIC | Age: 84
End: 2024-07-30
Payer: MEDICARE

## 2024-07-30 NOTE — TELEPHONE ENCOUNTER
Left a voice message on patients voicemail regarding request to fax lab orders to the [facility] in [place], [place]. Lab orders were faxed today.

## 2024-07-30 NOTE — TELEPHONE ENCOUNTER
Caller: JUNE    Relationship: SELF    Best call back number: [phone]        What was the call regarding: PLEASE SEND LAB ORDERS TO:    [email or web address]  [facility]  [address], [facility] [address] [facility] [address], [place] [address] · [address]  [phone]

## 2024-08-02 LAB
AMBIG ABBREV BMP8 DEFAULT: NORMAL
BUN SERPL-MCNC: 32 MG/DL (ref 8–27)
BUN/CREAT SERPL: 28 (ref 12–28)
CALCIUM SERPL-MCNC: 10.5 MG/DL (ref 8.7–10.3)
CHLORIDE SERPL-SCNC: 102 MMOL/L (ref 96–106)
CO2 SERPL-SCNC: 23 MMOL/L (ref 20–29)
CREAT SERPL-MCNC: 1.14 MG/DL (ref 0.57–1)
EGFRCR SERPLBLD CKD-EPI 2021: 48 ML/MIN/1.73
GLUCOSE SERPL-MCNC: 124 MG/DL (ref 70–99)
POTASSIUM SERPL-SCNC: 4.4 MMOL/L (ref 3.5–5.2)
SODIUM SERPL-SCNC: 141 MMOL/L (ref 134–144)

## 2024-08-11 DIAGNOSIS — F51.01 PRIMARY INSOMNIA: ICD-10-CM

## 2024-08-12 RX ORDER — TEMAZEPAM 30 MG/1
30 CAPSULE ORAL NIGHTLY PRN
Qty: 30 CAPSULE | Refills: 1 | Status: SHIPPED | OUTPATIENT
Start: 2024-08-12

## 2024-08-21 ENCOUNTER — TELEPHONE (OUTPATIENT)
Dept: FAMILY MEDICINE CLINIC | Facility: CLINIC | Age: 84
End: 2024-08-21
Payer: MEDICARE

## 2024-08-21 NOTE — TELEPHONE ENCOUNTER
Her sleep study shows that her sleep apnea is well-controlled on her current CPAP settings.  Continue to use her CPAP device.

## 2024-08-21 NOTE — TELEPHONE ENCOUNTER
I spoke with the patient in regards of her Respiratory Analysis     Leann stated she did wear her CPAP one night during this test     Please advise

## 2024-08-26 ENCOUNTER — TELEPHONE (OUTPATIENT)
Dept: SURGERY | Facility: CLINIC | Age: 84
End: 2024-08-26
Payer: MEDICARE

## 2024-08-26 NOTE — TELEPHONE ENCOUNTER
Caller: Fifi Leann RICHELLE    Relationship: Self    Best call back number: [phone]    What is the best time to reach you: ANY    Who are you requesting to speak with (clinical staff, provider,  specific staff member): CLINICAL    Do you know the name of the person who called: PT    What was the call regarding: PT CALLED AND STATES SHE IS ALWAYS SEEN AT THE  LOCATION HOWEVER HER FOLLOW UP FOR SEPT IS SCHEDULED IN [facility] AND SHE WOULD LIKE THAT CHANGED BACK TO . PLEASE GIVE HER A CALL BACK TO [facility]. THANK YOU    Is it okay if the provider responds through MyChart: NO

## 2024-08-27 ENCOUNTER — TELEPHONE (OUTPATIENT)
Dept: SURGERY | Facility: CLINIC | Age: 84
End: 2024-08-27
Payer: MEDICARE

## 2024-08-27 NOTE — TELEPHONE ENCOUNTER
I was able to get pt rescheduled for ct follow up on 9/18/24. Pt was agreeable ad understood    DB 2:39  8/27/24

## 2024-08-27 NOTE — TELEPHONE ENCOUNTER
I spoke with patient regarding her rescheduling her appointment. Pt decide she needed additional time to review the best options. I will reach out to pt today    DB 9:02  8/27/24      Caller: Fifi Leann PEOPLES    Relationship: Self    Best call back number: [phone]    What is the best time to reach you: ANY    Who are you requesting to speak with (clinical staff, provider,  specific staff member): CLINICAL    Do you know the name of the person who called: PT    What was the call regarding: PT CALLED AND STATES SHE IS ALWAYS SEEN AT THE  LOCATION HOWEVER HER FOLLOW UP FOR SEPT IS SCHEDULED IN [facility] AND SHE WOULD LIKE THAT CHANGED BACK TO . PLEASE GIVE HER A CALL BACK TO [facility]. THANK YOU    Is it okay if the provider responds through [facility]: NO

## 2024-09-10 ENCOUNTER — HOSPITAL ENCOUNTER (OUTPATIENT)
Dept: CT IMAGING | Facility: HOSPITAL | Age: 84
Discharge: HOME | End: 2024-09-10
Admitting: NURSE PRACTITIONER
Payer: MEDICARE

## 2024-09-10 DIAGNOSIS — Z87.891 FORMER SMOKER: ICD-10-CM

## 2024-09-10 DIAGNOSIS — Z85.118 HISTORY OF LUNG CANCER: ICD-10-CM

## 2024-09-10 PROCEDURE — 71250 CT THORAX DX C-: CPT

## 2024-09-13 ENCOUNTER — TELEPHONE (OUTPATIENT)
Dept: SURGERY | Facility: CLINIC | Age: 84
End: 2024-09-13
Payer: MEDICARE

## 2024-09-13 DIAGNOSIS — Z85.118 HISTORY OF LUNG CANCER: Primary | ICD-10-CM

## 2024-09-13 NOTE — TELEPHONE ENCOUNTER
Called pt back to discuss results from CT chest 9/10/2024:    Pt is status post right middle lobectomy by Dr. Fagan in July 2020 for stage I non-small cell lung cancer.     Impression:     1. Stable 6 mm right upper lobe nodule. Stable 2 to 3 mm tiny nodules in the upper lobes near the apices. No new pulmonary nodules are seen.  2.. Right middle lobectomy.  3. Hepatic steatosis.  4. Low-density thyroid nodules are again seen. Correlate with previous thyroid ultrasound findings.    No new pulmonary complaints. Plan to follow up in 6months with CT chest.     Diagnoses and all orders for this visit:    1. History of lung cancer (Primary)  -     CT Chest Without Contrast; Future        NOAM Red

## 2024-10-04 RX ORDER — LISINOPRIL AND HYDROCHLOROTHIAZIDE 12.5; 2 MG/1; MG/1
1 TABLET ORAL DAILY
Qty: 90 TABLET | Refills: 3 | Status: CANCELLED | OUTPATIENT
Start: 2024-10-04

## 2024-10-04 RX ORDER — LISINOPRIL AND HYDROCHLOROTHIAZIDE 12.5; 2 MG/1; MG/1
2 TABLET ORAL DAILY
Qty: 180 TABLET | Refills: 3 | Status: SHIPPED | OUTPATIENT
Start: 2024-10-04

## 2024-10-04 NOTE — TELEPHONE ENCOUNTER
Rx Refill Note  Requested Prescriptions     Pending Prescriptions Disp Refills    lisinopril-hydrochlorothiazide (PRINZIDE,ZESTORETIC) 20-12.5 MG per tablet 180 tablet 3     Sig: Take 2 tablets by mouth Daily.      Last office visit with prescribing clinician: 5/14/2024   Last telemedicine visit with prescribing clinician: Visit date not found   Next office visit with prescribing clinician: 5/14/2025                         Would you like a call back once the refill request has been completed: [] Yes [] No    If the office needs to give you a call back, can they leave a voicemail: [] Yes [] No    Kike Rojas MA  10/04/24, 13:25 EDT   This document is complete and the patient is ready for discharge.

## 2024-10-14 DIAGNOSIS — F51.01 PRIMARY INSOMNIA: ICD-10-CM

## 2024-10-15 RX ORDER — TEMAZEPAM 30 MG
30 CAPSULE ORAL NIGHTLY PRN
Qty: 30 CAPSULE | Refills: 1 | Status: SHIPPED | OUTPATIENT
Start: 2024-10-15

## 2024-10-23 RX ORDER — FAMOTIDINE 40 MG/1
TABLET, FILM COATED ORAL
Qty: 180 TABLET | Refills: 3 | Status: SHIPPED | OUTPATIENT
Start: 2024-10-23

## 2024-10-23 RX ORDER — DILTIAZEM HYDROCHLORIDE 180 MG/1
180 CAPSULE, COATED, EXTENDED RELEASE ORAL DAILY
Qty: 90 CAPSULE | Refills: 1 | Status: SHIPPED | OUTPATIENT
Start: 2024-10-23

## 2024-10-23 NOTE — TELEPHONE ENCOUNTER
Rx Refill Note  Requested Prescriptions     Pending Prescriptions Disp Refills    dilTIAZem CD (CARDIZEM CD) 180 MG 24 hr capsule [Pharmacy Med Name: DILTIAZEM CD 180MG CAPSULES (24 HR)] 90 capsule 1     Sig: TAKE 1 CAPSULE BY MOUTH DAILY      Last office visit with prescribing clinician: 5/14/2024   Last telemedicine visit with prescribing clinician: Visit date not found   Next office visit with prescribing clinician: 5/14/2025                         Would you like a call back once the refill request has been completed: [] Yes [] No    If the office needs to give you a call back, can they leave a voicemail: [] Yes [] No    iKke Rojas MA  10/23/24, 08:09 EDT

## 2024-12-11 DIAGNOSIS — F51.01 PRIMARY INSOMNIA: ICD-10-CM

## 2024-12-11 RX ORDER — TEMAZEPAM 30 MG/1
30 CAPSULE ORAL NIGHTLY PRN
Qty: 30 CAPSULE | Refills: 1 | Status: SHIPPED | OUTPATIENT
Start: 2024-12-11

## 2024-12-17 ENCOUNTER — LAB (OUTPATIENT)
Dept: FAMILY MEDICINE CLINIC | Facility: CLINIC | Age: 84
End: 2024-12-17
Payer: MEDICARE

## 2024-12-17 ENCOUNTER — OFFICE VISIT (OUTPATIENT)
Dept: FAMILY MEDICINE CLINIC | Facility: CLINIC | Age: 84
End: 2024-12-17
Payer: MEDICARE

## 2024-12-17 VITALS
HEART RATE: 81 BPM | DIASTOLIC BLOOD PRESSURE: 79 MMHG | OXYGEN SATURATION: 95 % | HEIGHT: 64 IN | BODY MASS INDEX: 43.16 KG/M2 | WEIGHT: 252.8 LBS | SYSTOLIC BLOOD PRESSURE: 126 MMHG | TEMPERATURE: 98.9 F

## 2024-12-17 DIAGNOSIS — Z00.00 MEDICARE ANNUAL WELLNESS VISIT, SUBSEQUENT: Primary | ICD-10-CM

## 2024-12-17 DIAGNOSIS — R73.03 PREDIABETES: ICD-10-CM

## 2024-12-17 DIAGNOSIS — I10 ESSENTIAL HYPERTENSION: ICD-10-CM

## 2024-12-17 PROCEDURE — 80053 COMPREHEN METABOLIC PANEL: CPT | Performed by: FAMILY MEDICINE

## 2024-12-17 PROCEDURE — 84443 ASSAY THYROID STIM HORMONE: CPT | Performed by: FAMILY MEDICINE

## 2024-12-17 PROCEDURE — 1126F AMNT PAIN NOTED NONE PRSNT: CPT | Performed by: FAMILY MEDICINE

## 2024-12-17 PROCEDURE — 3074F SYST BP LT 130 MM HG: CPT | Performed by: FAMILY MEDICINE

## 2024-12-17 PROCEDURE — 1160F RVW MEDS BY RX/DR IN RCRD: CPT | Performed by: FAMILY MEDICINE

## 2024-12-17 PROCEDURE — 83036 HEMOGLOBIN GLYCOSYLATED A1C: CPT | Performed by: FAMILY MEDICINE

## 2024-12-17 PROCEDURE — 3078F DIAST BP <80 MM HG: CPT | Performed by: FAMILY MEDICINE

## 2024-12-17 PROCEDURE — 80061 LIPID PANEL: CPT | Performed by: FAMILY MEDICINE

## 2024-12-17 PROCEDURE — 36415 COLL VENOUS BLD VENIPUNCTURE: CPT | Performed by: FAMILY MEDICINE

## 2024-12-17 PROCEDURE — 1159F MED LIST DOCD IN RCRD: CPT | Performed by: FAMILY MEDICINE

## 2024-12-17 PROCEDURE — 86141 C-REACTIVE PROTEIN HS: CPT | Performed by: FAMILY MEDICINE

## 2024-12-17 PROCEDURE — G0439 PPPS, SUBSEQ VISIT: HCPCS | Performed by: FAMILY MEDICINE

## 2024-12-17 RX ORDER — TIMOLOL MALEATE 5 MG/ML
1 SOLUTION/ DROPS OPHTHALMIC 2 TIMES DAILY
COMMUNITY

## 2024-12-17 NOTE — ASSESSMENT & PLAN NOTE
Hypertension is stable and controlled  Continue current treatment regimen.  Blood pressure will be reassessed in 6 months.    Orders:    TSH    Comprehensive Metabolic Panel    Hemoglobin A1c    Lipid Panel    High Sensitivity CRP; Future

## 2024-12-17 NOTE — ASSESSMENT & PLAN NOTE
Orders:    TSH    Comprehensive Metabolic Panel    Hemoglobin A1c    Lipid Panel    High Sensitivity CRP; Future

## 2024-12-17 NOTE — PROGRESS NOTES
Subjective   The ABCs of the Annual Wellness Visit  Medicare Wellness Visit      Leann Calix is a 84 y.o. patient who presents for a Medicare Wellness Visit.    The following portions of the patient's history were reviewed and   updated as appropriate: allergies, current medications, past family history, past medical history, past social history, past surgical history, and problem list.    Compared to one year ago, the patient's physical   health is the same.  Compared to one year ago, the patient's mental   health is the same.    Recent Hospitalizations:  She was not admitted to the hospital during the last year.     Current Medical Providers:  Patient Care Team:  Ryann Simon MD as PCP - General  Ryann Simon MD as PCP - Family Medicine  Thuan Hickman MD as Consulting Physician (Cardiology)  Eddie Meeks MD as Consulting Physician (Otolaryngology)  Emi Leary APRN as Nurse Practitioner (Nurse Practitioner)  Cecily Santa APRN as Nurse Practitioner (Cardiology)  Sheila Wolf DNP, APRN as Nurse Practitioner (Thoracic Surgery)    Outpatient Medications Prior to Visit   Medication Sig Dispense Refill    atorvastatin (LIPITOR) 10 MG tablet Take 1 tablet by mouth Daily. 90 tablet 3    dilTIAZem CD (CARDIZEM CD) 180 MG 24 hr capsule TAKE 1 CAPSULE BY MOUTH DAILY 90 capsule 1    famotidine (PEPCID) 40 MG tablet TAKE 1 TABLET BY MOUTH TWICE DAILY AS NEEDED FOR HEARTBURN OR INDIGESTION 180 tablet 3    lisinopril-hydrochlorothiazide (PRINZIDE,ZESTORETIC) 20-12.5 MG per tablet Take 2 tablets by mouth Daily. 180 tablet 3    Nasacort Allergy 24HR 55 MCG/ACT nasal inhaler 2 sprays Daily.      temazepam (RESTORIL) 30 MG capsule TAKE 1 CAPSULE BY MOUTH AT NIGHT AS NEEDED FOR SLEEP 30 capsule 1    timolol (TIMOPTIC) 0.5 % ophthalmic solution Administer 1 drop to both eyes 2 (Two) Times a Day.      Multiple Vitamins-Minerals (MULTIVITAMIN ADULTS PO) Take 1 tablet by mouth Daily.      FLUoxetine  (PROzac) 40 MG capsule Take 1 capsule by mouth Daily. 90 capsule 1     Facility-Administered Medications Prior to Visit   Medication Dose Route Frequency Provider Last Rate Last Admin    cyanocobalamin injection 1,000 mcg  1,000 mcg Intramuscular Q28 Days Ryann Simon MD   1,000 mcg at 04/17/23 1336     No opioid medication identified on active medication list. I have reviewed chart for other potential  high risk medication/s and harmful drug interactions in the elderly.      Aspirin is not on active medication list.  Aspirin use is not indicated based on review of current medical condition/s. Risk of harm outweighs potential benefits.  .    Patient Active Problem List   Diagnosis    Inflammatory polyarthropathy    Essential hypertension    Status post hip replacement    Thyroid nodule    Degeneration of intervertebral disc    Depression    Fatty liver    Insomnia    Low back pain    Lumbosacral radiculopathy    Spinal stenosis of lumbar region    Obesity (BMI 30-39.9)    Postmenopausal status    Tobacco dependence in remission    Encounter for screening mammogram for malignant neoplasm of breast     Dry eye syndrome, bilateral    Fuchs' corneal dystrophy    Lens replaced by other means    Ocular hypertension, bilateral    Open angle with borderline findings, low risk, bilateral    Pseudophakia of both eyes    PVD (posterior vitreous detachment), both eyes    Vitreous degeneration    Sleep apnea    Primary cancer of right middle lobe of lung    Medicare annual wellness visit, subsequent    Right upper lobe pulmonary nodule    Urge incontinence    Hyperglycemia    Fatigue    Hyperlipidemia    Non-recurrent acute suppurative otitis media of right ear without spontaneous rupture of tympanic membrane    Anxiety    Hyperthyroidism    Vitamin B12 deficiency    Vitamin D deficiency    Multiple thyroid nodules    Morbid (severe) obesity due to excess calories    Balance disorder    Prediabetes     Advance Care  "Planning Advance Directive is on file.  ACP discussion was held with the patient during this visit. Patient has an advance directive in EMR which is still valid.             Objective   Vitals:    12/17/24 1318   BP: 126/79   BP Location: Right arm   Patient Position: Sitting   Cuff Size: Adult   Pulse: 81   Temp: 98.9 °F (37.2 °C)   TempSrc: Temporal   SpO2: 95%   Weight: 115 kg (252 lb 12.8 oz)   Height: 163.8 cm (64.49\")   PainSc: 0-No pain       Estimated body mass index is 42.74 kg/m² as calculated from the following:    Height as of this encounter: 163.8 cm (64.49\").    Weight as of this encounter: 115 kg (252 lb 12.8 oz).  Physical Exam  Vitals and nursing note reviewed.   Constitutional:       General: She is not in acute distress.     Appearance: She is well-developed.   HENT:      Head: Normocephalic.   Eyes:      General: Lids are normal.      Conjunctiva/sclera: Conjunctivae normal.   Neck:      Thyroid: No thyroid mass or thyromegaly.      Trachea: Trachea normal.   Cardiovascular:      Rate and Rhythm: Normal rate and regular rhythm.      Heart sounds: Normal heart sounds.   Pulmonary:      Effort: Pulmonary effort is normal.      Breath sounds: Normal breath sounds.   Abdominal:      Palpations: Abdomen is soft.   Musculoskeletal:      Cervical back: Normal range of motion.      Right lower leg: No edema.      Left lower leg: No edema.   Lymphadenopathy:      Cervical: No cervical adenopathy.   Skin:     General: Skin is warm and dry.   Neurological:      Mental Status: She is alert and oriented to person, place, and time.   Psychiatric:         Attention and Perception: She is attentive.         Mood and Affect: Mood normal.         Speech: Speech normal.         Behavior: Behavior normal.                     Does the patient have evidence of cognitive impairment? No                                                                                               Health  Risk Assessment    Smoking " Status:  Social History     Tobacco Use   Smoking Status Former    Current packs/day: 0.00    Average packs/day: 1 pack/day for 30.0 years (30.0 ttl pk-yrs)    Types: Cigarettes    Start date: 1976    Quit date: 2006    Years since quittin.5    Passive exposure: Past   Smokeless Tobacco Never     Alcohol Consumption:  Social History     Substance and Sexual Activity   Alcohol Use No       Fall Risk Screen  STEADI Fall Risk Assessment was completed, and patient is at LOW risk for falls.Assessment completed on:2024    Depression Screening   Little interest or pleasure in doing things? Several days   Feeling down, depressed, or hopeless? Not at all   PHQ-2 Total Score 1      Health Habits and Functional and Cognitive Screenin/10/2024    10:00 AM   Functional & Cognitive Status   Do you have difficulty preparing food and eating? No    Do you have difficulty bathing yourself, getting dressed or grooming yourself? No    Do you have difficulty using the toilet? No    Do you have difficulty moving around from place to place? No    Do you have trouble with steps or getting out of a bed or a chair? No    Current Diet Well Balanced Diet    Dental Exam Up to date    Eye Exam Up to date    Exercise (times per week) 3 times per week    Current Exercises Include House Cleaning;Stationary Bicycling/Spin Class    Do you need help using the phone?  No    Are you deaf or do you have serious difficulty hearing?  No    Do you need help to go to places out of walking distance? No    Do you need help shopping? No    Do you need help preparing meals?  No    Do you need help with housework?  No    Do you need help with laundry? No    Do you need help taking your medications? No    Do you need help managing money? No    Do you ever drive or ride in a car without wearing a seat belt? No    Have you felt unusual stress, anger or loneliness in the last month? No    Who do you live with? Alone    If you need help,  do you have trouble finding someone available to you? No    Have you been bothered in the last four weeks by sexual problems? No    Do you have difficulty concentrating, remembering or making decisions? No        Patient-reported           Age-appropriate Screening Schedule:  Refer to the list below for future screening recommendations based on patient's age, sex and/or medical conditions. Orders for these recommended tests are listed in the plan section. The patient has been provided with a written plan.    Health Maintenance List  Health Maintenance   Topic Date Due    DXA SCAN  02/08/2024    COVID-19 Vaccine (6 - 2024-25 season) 09/01/2024    LIPID PANEL  12/15/2024    BMI FOLLOWUP  04/08/2025    ANNUAL WELLNESS VISIT  12/17/2025    TDAP/TD VACCINES (2 - Td or Tdap) 02/23/2033    RSV Vaccine - Adults  Completed    INFLUENZA VACCINE  Completed    Pneumococcal Vaccine 65+  Completed    ZOSTER VACCINE  Completed    LUNG CANCER SCREENING  Discontinued                                                                                                                                                CMS Preventative Services Quick Reference  Risk Factors Identified During Encounter  Inactivity/Sedentary: Patient was advised to exercise at least 150 minutes a week per CDC recommendations.  Dental Screening Recommended  Vision Screening Recommended    The above risks/problems have been discussed with the patient.  Pertinent information has been shared with the patient in the After Visit Summary.  An After Visit Summary and PPPS were made available to the patient.    Follow Up:   Next Medicare Wellness visit to be scheduled in 1 year.     Assessment & Plan  Medicare annual wellness visit, subsequent         Prediabetes    Orders:    TSH    Comprehensive Metabolic Panel    Hemoglobin A1c    Lipid Panel    High Sensitivity CRP; Future    Essential hypertension  Hypertension is stable and controlled  Continue current treatment  regimen.  Blood pressure will be reassessed in 6 months.    Orders:    TSH    Comprehensive Metabolic Panel    Hemoglobin A1c    Lipid Panel    High Sensitivity CRP; Future         Follow Up:   No follow-ups on file.

## 2024-12-18 LAB
ALBUMIN SERPL-MCNC: 4.1 G/DL (ref 3.5–5.2)
ALBUMIN/GLOB SERPL: 1.3 G/DL
ALP SERPL-CCNC: 73 U/L (ref 39–117)
ALT SERPL W P-5'-P-CCNC: 17 U/L (ref 1–33)
ANION GAP SERPL CALCULATED.3IONS-SCNC: 11 MMOL/L (ref 5–15)
AST SERPL-CCNC: 15 U/L (ref 1–32)
BILIRUB SERPL-MCNC: 0.2 MG/DL (ref 0–1.2)
BUN SERPL-MCNC: 33 MG/DL (ref 8–23)
BUN/CREAT SERPL: 30 (ref 7–25)
CALCIUM SPEC-SCNC: 9.4 MG/DL (ref 8.6–10.5)
CHLORIDE SERPL-SCNC: 105 MMOL/L (ref 98–107)
CHOLEST SERPL-MCNC: 135 MG/DL (ref 0–200)
CO2 SERPL-SCNC: 23 MMOL/L (ref 22–29)
CREAT SERPL-MCNC: 1.1 MG/DL (ref 0.57–1)
CRP SERPL-MCNC: 0.61 MG/DL (ref 0.01–0.5)
EGFRCR SERPLBLD CKD-EPI 2021: 50 ML/MIN/1.73
GLOBULIN UR ELPH-MCNC: 3.2 GM/DL
GLUCOSE SERPL-MCNC: 89 MG/DL (ref 65–99)
HBA1C MFR BLD: 6 % (ref 4.8–5.6)
HDLC SERPL-MCNC: 54 MG/DL (ref 40–60)
LDLC SERPL CALC-MCNC: 60 MG/DL (ref 0–100)
LDLC/HDLC SERPL: 1.05 {RATIO}
POTASSIUM SERPL-SCNC: 4 MMOL/L (ref 3.5–5.2)
PROT SERPL-MCNC: 7.3 G/DL (ref 6–8.5)
SODIUM SERPL-SCNC: 139 MMOL/L (ref 136–145)
TRIGL SERPL-MCNC: 121 MG/DL (ref 0–150)
TSH SERPL DL<=0.05 MIU/L-ACNC: 0.47 UIU/ML (ref 0.27–4.2)
VLDLC SERPL-MCNC: 21 MG/DL (ref 5–40)

## 2024-12-22 ENCOUNTER — PATIENT MESSAGE (OUTPATIENT)
Dept: FAMILY MEDICINE CLINIC | Facility: CLINIC | Age: 84
End: 2024-12-22
Payer: MEDICARE

## 2024-12-23 RX ORDER — SERTRALINE HYDROCHLORIDE 25 MG/1
25 TABLET, FILM COATED ORAL DAILY
Qty: 90 TABLET | Refills: 1 | Status: SHIPPED | OUTPATIENT
Start: 2024-12-23

## 2025-02-06 DIAGNOSIS — F51.01 PRIMARY INSOMNIA: ICD-10-CM

## 2025-02-07 RX ORDER — TEMAZEPAM 30 MG/1
30 CAPSULE ORAL NIGHTLY PRN
Qty: 30 CAPSULE | Refills: 5 | Status: SHIPPED | OUTPATIENT
Start: 2025-02-07

## 2025-03-03 DIAGNOSIS — E04.2 MULTIPLE THYROID NODULES: Primary | ICD-10-CM

## 2025-03-06 ENCOUNTER — HOSPITAL ENCOUNTER (OUTPATIENT)
Dept: ULTRASOUND IMAGING | Facility: HOSPITAL | Age: 85
Discharge: HOME | End: 2025-03-06
Admitting: INTERNAL MEDICINE
Payer: MEDICARE

## 2025-03-06 DIAGNOSIS — E04.2 MULTIPLE THYROID NODULES: ICD-10-CM

## 2025-03-06 PROCEDURE — 76536 US EXAM OF HEAD AND NECK: CPT

## 2025-03-11 ENCOUNTER — OFFICE VISIT (OUTPATIENT)
Dept: ENDOCRINOLOGY | Facility: CLINIC | Age: 85
End: 2025-03-11
Payer: MEDICARE

## 2025-03-11 VITALS
HEIGHT: 64 IN | DIASTOLIC BLOOD PRESSURE: 80 MMHG | HEART RATE: 95 BPM | SYSTOLIC BLOOD PRESSURE: 145 MMHG | OXYGEN SATURATION: 95 % | BODY MASS INDEX: 42.68 KG/M2 | WEIGHT: 250 LBS

## 2025-03-11 DIAGNOSIS — E05.90 HYPERTHYROIDISM: ICD-10-CM

## 2025-03-11 DIAGNOSIS — E04.2 MULTIPLE THYROID NODULES: Primary | ICD-10-CM

## 2025-03-11 RX ORDER — METHIMAZOLE 5 MG/1
5 TABLET ORAL DAILY
Qty: 30 TABLET | Refills: 5 | Status: SHIPPED | OUTPATIENT
Start: 2025-03-11 | End: 2026-03-11

## 2025-03-11 NOTE — PATIENT INSTRUCTIONS
Start methimazole 5 mg p.o. daily  Check CBC, CMP, TSH and free T4 in 6 weeks  Follow-up in 3 months with TSH and free T4.

## 2025-03-11 NOTE — PROGRESS NOTES
Endocrine Progress Note Outpatient     Patient Care Team:  Ryann Simon MD as PCP - General  Ryann Simon MD as PCP - Family Medicine  Thuan Hickman MD as Consulting Physician (Cardiology)  Eddie Meeks MD as Consulting Physician (Otolaryngology)  Emi Leary APRN as Nurse Practitioner (Nurse Practitioner)  Cecily Santa APRN as Nurse Practitioner (Cardiology)  Sheila Wolf DNP, APRN as Nurse Practitioner (Thoracic Surgery)    Chief Complaint:     Chief Complaint: Thyroid nodule/abnormal thyroid function test    HPI: This is a 84-year-old female with history of multiple thyroid nodules is here for follow-up.  She had an ultrasound thyroid done back in July 2023 which did show a right dominant thyroid nodule which was biopsied and was found to be benign. She is not taking any thyroid medications at this time.      There is no family history of thyroid cancer or history of radiation exposure.  She denies any trouble swallowing or choking or change in the voice or hoarseness.  She does admit heat intolerance, fatigue and tiredness with insomnia, she denies any sweating's or mood swings.  She is not taking any thyroid medications at this time.        Past Medical History:   Diagnosis Date    Adenocarcinoma, lung, right 2020    Allergic 2007    Bisoprolol    Anemia 12/13/2022    Back pain     LOW RADICULAR PAIN LEFT LEG    Cataract 2006    Cataract surgery    Cholelithiasis 1996    Had stones    Chronic insomnia     Claustrophobia 1969    Fatty liver     Ganglion cyst     GERD (gastroesophageal reflux disease) 07/08/2021    H/O degenerative disc disease     Hiatal hernia     Hiatal hernia     Hyperlipidemia 12/07/2021    Hypertension     DR HICKMAN    Hyperthyroidism 04/17/2023    Hypothyroidism 4/14/23    Blood work from 4/13/23 showed low TSH level.    OAB (overactive bladder)     Obesity 1985    I'm now losing weight on HMR program    Osteoarthritis 2002    Pneumonia 1945    Sleep  apnea     uses BiPAP    Spondylolisthesis     Surgery    Thyroid nodule 2016    Removed nodules. Now i have more.    Uterine cancer 1984    Visual impairment 24    Fuch's corneal disease    Vitamin D deficiency        Social History     Socioeconomic History    Marital status:     Number of children: 2    Years of education: 12   Tobacco Use    Smoking status: Former     Current packs/day: 0.00     Average packs/day: 1 pack/day for 30.0 years (30.0 ttl pk-yrs)     Types: Cigarettes     Start date: 1976     Quit date: 2006     Years since quittin.7     Passive exposure: Past    Smokeless tobacco: Never   Vaping Use    Vaping status: Never Used   Substance and Sexual Activity    Alcohol use: No    Drug use: No    Sexual activity: Not Currently     Partners: Male     Birth control/protection: Pill, Tubal ligation, Birth control pill, Hysterectomy, Partner of same sex     Comment: Tubes tied in        Family History   Problem Relation Age of Onset    Cancer Mother         Don't know where cancer began.    Kidney disease Mother         Cancer may have begun in kidney???    Stroke Sister         brain bleed    Thyroid disease Sister         Thyroid removed.    Allergies Sister     Allergies Brother     Cancer Daughter         Ewings Sarcoma    Cancer Maternal Aunt         Breast cancer    Arthritis Maternal Grandmother     Alcohol abuse Maternal Grandfather     Cancer Other        Allergies   Allergen Reactions    Beta Adrenergic Blockers Other (See Comments)     Couldn't breathe or move    Bisoprolol Unknown - High Severity    Molds & Smuts Unknown - High Severity       ROS:   Constitutional:  Admit fatigue, tiredness.    Eyes:  Denies change in visual acuity   HENT:  Denies nasal congestion or sore throat   Respiratory: denies cough, Admit shortness of breath.   Cardiovascular:  denies chest pain, edema   GI:  Denies abdominal pain, nausea, vomiting.   Musculoskeletal:  Denies back  pain or joint pain   Integument:  Admit dry skin and brittle nails.   Neurologic:  Denies headache, focal weakness or sensory changes   Endocrine:  Denies polyuria or polydipsia   Psychiatric:  Denies depression or anxiety      Vitals:    03/11/25 1056   BP: 145/80   Pulse: 95   SpO2: 95%     Body mass index is 42.26 kg/m².     Physical Exam:  GEN: NAD, conversant  EYES: EOMI, PERRL,  NECK: no thyromegaly,   CV: RRR  LUNG: CTA  PSYCH: Awake and coherent      Results Review:     I reviewed the patient's new clinical results.    Lab Results   Component Value Date    HGBA1C 6.00 (H) 12/17/2024    HGBA1C 6.10 (H) 06/14/2024    HGBA1C 5.6 12/13/2022      Lab Results   Component Value Date    GLUCOSE 89 12/17/2024    BUN 33 (H) 12/17/2024    CREATININE 1.10 (H) 12/17/2024    EGFRIFNONA 65 07/31/2020    EGFRIFAFRI 57 (L) 08/26/2019    BCR 30.0 (H) 12/17/2024    K 4.0 12/17/2024    CO2 23.0 12/17/2024    CALCIUM 9.4 12/17/2024    ALBUMIN 4.1 12/17/2024    AST 15 12/17/2024    ALT 17 12/17/2024    CHOL 135 12/17/2024    TRIG 121 12/17/2024    LDL 60 12/17/2024    HDL 54 12/17/2024     Lab Results   Component Value Date    TSH 0.444 (L) 03/06/2025    FREET4 0.98 06/30/2023    THYROIDAB <9 06/30/2023     TSH (12/15/2023 13:36)    Fine Needle Aspiration (08/01/2023 13:00)    US Thyroid (02/01/2024 11:15)    NM Thyroid Uptake & Scan (07/14/2023 13:32)    US Thyroid (07/13/2023 13:17)    Medication Review: Reviewed.       Current Outpatient Medications:     atorvastatin (LIPITOR) 10 MG tablet, Take 1 tablet by mouth Daily., Disp: 90 tablet, Rfl: 3    dilTIAZem CD (CARDIZEM CD) 180 MG 24 hr capsule, TAKE 1 CAPSULE BY MOUTH DAILY, Disp: 90 capsule, Rfl: 1    famotidine (PEPCID) 40 MG tablet, TAKE 1 TABLET BY MOUTH TWICE DAILY AS NEEDED FOR HEARTBURN OR INDIGESTION, Disp: 180 tablet, Rfl: 3    lisinopril-hydrochlorothiazide (PRINZIDE,ZESTORETIC) 20-12.5 MG per tablet, Take 2 tablets by mouth Daily., Disp: 180 tablet, Rfl: 3     Nasacort Allergy 24HR 55 MCG/ACT nasal inhaler, 2 sprays Daily., Disp: , Rfl:     sertraline (Zoloft) 25 MG tablet, Take 1 tablet by mouth Daily., Disp: 90 tablet, Rfl: 1    temazepam (RESTORIL) 30 MG capsule, Take 1 capsule by mouth At Night As Needed for Sleep., Disp: 30 capsule, Rfl: 5    timolol (TIMOPTIC) 0.5 % ophthalmic solution, Administer 1 drop to both eyes 2 (Two) Times a Day., Disp: , Rfl:           Assessment and plan:  Multiple thyroid nodules with a dominant nodule in the right side which has been biopsied and benign.  Most recent thyroid ultrasound done in March 2025 showed stable thyroid nodules.    Hyperthyroidism: She has low TSH.  She does have symptoms suggestive of hyperthyroidism.  Previous workup did show a normal TSI and TPO antibodies and thyroid scan and uptake showed a cold nodule on the right side with heterogeneous uptake.  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  At this time I will add methimazole 5 mg p.o. daily and follow labs in 6 to 8 weeks.    Assessment and plan from March 11, 2024 reviewed and updated.           Di Donaldson MD FACE.

## 2025-03-12 ENCOUNTER — HOSPITAL ENCOUNTER (OUTPATIENT)
Dept: CT IMAGING | Facility: HOSPITAL | Age: 85
Discharge: HOME | End: 2025-03-12
Admitting: NURSE PRACTITIONER
Payer: MEDICARE

## 2025-03-12 DIAGNOSIS — Z85.118 HISTORY OF LUNG CANCER: ICD-10-CM

## 2025-03-12 PROCEDURE — 71250 CT THORAX DX C-: CPT

## 2025-03-18 ENCOUNTER — OFFICE VISIT (OUTPATIENT)
Dept: SURGERY | Facility: CLINIC | Age: 85
End: 2025-03-18
Payer: MEDICARE

## 2025-03-18 VITALS
WEIGHT: 249.8 LBS | OXYGEN SATURATION: 94 % | BODY MASS INDEX: 42.65 KG/M2 | HEIGHT: 64 IN | DIASTOLIC BLOOD PRESSURE: 68 MMHG | HEART RATE: 87 BPM | SYSTOLIC BLOOD PRESSURE: 142 MMHG

## 2025-03-18 DIAGNOSIS — Z87.891 FORMER SMOKER: ICD-10-CM

## 2025-03-18 DIAGNOSIS — Z85.118 HISTORY OF LUNG CANCER: Primary | ICD-10-CM

## 2025-03-18 NOTE — PROGRESS NOTES
"Chief Complaint  Follow up, RML resection for NSCLC    Subjective        June E Fifi presents to [facility] THORACIC SURGERY  History of Present Illness     Ms. Calix is a pleasant 84-year-old lady who is status post right middle lobectomy by Dr. Fagan in July 2020 for stage I non-small cell lung cancer.  She is a former smoker with an approximate 30-pack-year history. Patient quit in 2006.   She has no new pulmonary complaints.  Patient endorses she has been experiencing fatigue. She follows with endocrinology for her thyroid and has recently started a new medication. She presents today with CT chest.      Objective   Vital Signs:  /68 (BP Location: Left arm, Patient Position: Sitting)   Pulse 87   Ht 163.8 cm (64.49\")   Wt 113 kg (249 lb 12.8 oz)   SpO2 94%   BMI 42.23 kg/m²   Estimated body mass index is 42.23 kg/m² as calculated from the following:    Height as of this encounter: 163.8 cm (64.49\").    Weight as of this encounter: 113 kg (249 lb 12.8 oz).             Physical Exam  Constitutional:       General: She is not in acute distress.     Appearance: Normal appearance. She is overweight. She is not ill-appearing.   HENT:      Head: Normocephalic and atraumatic.      Nose: Nose normal.   Eyes:      General: No scleral icterus.     Conjunctiva/sclera: Conjunctivae normal.   Cardiovascular:      Rate and Rhythm: Normal rate.   Pulmonary:      Effort: Pulmonary effort is normal. No respiratory distress.   Chest:      Chest wall: No tenderness.   Abdominal:      Palpations: Abdomen is soft.   Musculoskeletal:         General: Normal range of motion.      Cervical back: Normal range of motion and neck supple.      Comments:      Skin:     General: Skin is warm and dry.   Neurological:      General: No focal deficit present.      Mental Status: She is alert and oriented to person, place, and time.   Psychiatric:         Behavior: Behavior normal.         Thought Content: Thought " content normal.         Judgment: Judgment normal.        Result Review :    CT chest 3/12/2025: Stable postsurgical changes in right middle lobe. Stable 6 mm nodule right upper lobe, stability dating back to 3/2/2023. Redemonstrated pleural fat along left lung apex. No pleural effusion, pneumothorax, or new suspicious nodules. Pathologically enlarged lymph nodes.    CT chest 2/12/2024: Postsurgical changes from right middle lobe resection as before.  Stable 6 mm nodule in the right upper lobe.  Two Micronodules in the right upper lobe without change.  No new or enlarging pulmonary nodules.  Calcified right hilar lymph nodes.  Benign subpleural lipoma in the left apex without change.  No mediastinal or hilar lymphadenopathy.  Imaging reviewed independently.      CT chest 9/8/2023: Stable 6 mm nodule in the right upper lobe as well as 3 stable micronodules measuring 2 to 3 mm in the right upper lobe.  Postoperative changes from right middle lobectomy.  No new or enlarging pulmonary nodules.  No pleural or pericardial effusion.  Left apical lipoma without change.    CT chest 3/2/2023:  postoperative changes consistent with a right middle lobectomy.  There is a stable 3 mm nodule in the right upper lobe and a stable 6 mm nodule in the right upper lobe.  There is a calcified granuloma in the left upper lobe and a stable pleural lipoma in the left apex.  No new or enlarging pulmonary nodules identified.  Pleural or pericardial effusion.             Assessment and Plan   Diagnoses and all orders for this visit:    1. History of lung cancer (Primary)    2. Former smoker        Ms. Calix is status post right middle lobectomy by Dr. Fagan in July 2020 for T1BN0 adenocarcinoma.  Her most recent CT chest is stable without evidence of recurrent disease.  She has a stable 6 mm nodule in the right upper lobe.  We will continue her surveillance with a CT of the chest in 12 months followed by an office visit to review the  findings. Discussed with patient and she is in agreement with this plan.     I spent 25 minutes caring for Leann on this date of service. This time includes time spent by me in the following activities:preparing for the visit, reviewing tests, obtaining and/or reviewing a separately obtained history, performing a medically appropriate examination and/or evaluation , counseling and educating the patient/family/caregiver, ordering medications, tests, or procedures, documenting information in the medical record, independently interpreting results and communicating that information with the patient/family/caregiver and care coordination     Follow Up   Return in about 1 year (around 3/18/2026) for Next scheduled follow up.  Patient was given instructions and counseling regarding her condition or for health maintenance advice. Please see specific information pulled into the AVS if appropriate.

## 2025-04-21 RX ORDER — ATORVASTATIN CALCIUM 10 MG/1
10 TABLET, FILM COATED ORAL DAILY
Qty: 90 TABLET | Refills: 3 | Status: SHIPPED | OUTPATIENT
Start: 2025-04-21

## 2025-04-21 RX ORDER — DILTIAZEM HYDROCHLORIDE 180 MG/1
180 CAPSULE, COATED, EXTENDED RELEASE ORAL DAILY
Qty: 90 CAPSULE | Refills: 1 | Status: SHIPPED | OUTPATIENT
Start: 2025-04-21

## 2025-04-21 NOTE — TELEPHONE ENCOUNTER
Rx Refill Note  Requested Prescriptions     Signed Prescriptions Disp Refills    dilTIAZem CD (CARDIZEM CD) 180 MG 24 hr capsule 90 capsule 1     Sig: TAKE 1 CAPSULE BY MOUTH DAILY     Authorizing Provider: IRINA ONEAL     Ordering User: KELSEY MCDUFFIE    atorvastatin (LIPITOR) 10 MG tablet 90 tablet 3     Sig: TAKE 1 TABLET BY MOUTH DAILY     Authorizing Provider: IRINA ONEAL     Ordering User: KELSEY [email or web address]      Last office visit with prescribing clinician: 5/14/2024   Last telemedicine visit with prescribing clinician: Visit date not found   Next office visit with prescribing clinician: 5/14/2025                         Would you like a call back once the refill request has been completed: [] Yes [] No    If the office needs to give you a call back, can they leave a voicemail: [] Yes [] No    Kelsey Mcduffie MA  04/21/25, 12:47 EDT

## 2025-04-25 ENCOUNTER — RESULTS FOLLOW-UP (OUTPATIENT)
Dept: ENDOCRINOLOGY | Facility: CLINIC | Age: 85
End: 2025-04-25
Payer: MEDICARE

## 2025-05-06 ENCOUNTER — PATIENT MESSAGE (OUTPATIENT)
Dept: FAMILY MEDICINE CLINIC | Facility: CLINIC | Age: 85
End: 2025-05-06
Payer: MEDICARE

## 2025-05-14 ENCOUNTER — OFFICE VISIT (OUTPATIENT)
Dept: CARDIOLOGY | Facility: CLINIC | Age: 85
End: 2025-05-14
Payer: MEDICARE

## 2025-05-14 VITALS
DIASTOLIC BLOOD PRESSURE: 76 MMHG | BODY MASS INDEX: 42.51 KG/M2 | HEIGHT: 64 IN | HEART RATE: 74 BPM | OXYGEN SATURATION: 98 % | SYSTOLIC BLOOD PRESSURE: 133 MMHG | WEIGHT: 249 LBS

## 2025-05-14 DIAGNOSIS — I10 ESSENTIAL HYPERTENSION: Primary | ICD-10-CM

## 2025-05-14 DIAGNOSIS — I25.10 CORONARY ARTERY CALCIFICATION: ICD-10-CM

## 2025-05-14 DIAGNOSIS — G47.33 OBSTRUCTIVE SLEEP APNEA: ICD-10-CM

## 2025-05-14 DIAGNOSIS — E78.5 HYPERLIPIDEMIA, UNSPECIFIED HYPERLIPIDEMIA TYPE: ICD-10-CM

## 2025-05-14 RX ORDER — ASPIRIN 81 MG/1
81 TABLET, CHEWABLE ORAL DAILY
COMMUNITY

## 2025-05-14 NOTE — PROGRESS NOTES
Subjective:     Encounter Date:05/14/2025      Patient ID: Leann Calix is a 84 y.o. female.    Chief Complaint:  Hypertension  Associated symptoms: malaise/fatigue and shortness of breath    Associated symptoms: no chest pain, no headaches, no palpitations and no dizziness    84-year-old white female with history of coronary disease hypertension and hyperlipidemia presents to my office for a follow-up.  Patient is currently stable without any symptoms of chest pain but has shortness of breath with exertion.  No grandmas any PND orthopnea.  No palpitation dizziness syncope or swelling of the feet.  Patient is taking all the medicines regular.  Patient does not smoke.    The following portions of the patient's history were reviewed and updated as appropriate: allergies, current medications, past family history, past medical history, past social history, past surgical history, and problem list.  Past Medical History:   Diagnosis Date    Adenocarcinoma, lung, right 2020    Allergic 2007    Bisoprolol    Anemia 12/13/2022    Back pain     LOW RADICULAR PAIN LEFT LEG    Bursitis ????    Cataract 2006    Cataract surgery    Cholelithiasis 1996    Had stones    Chronic insomnia     Claustrophobia 1969    Fatty liver     Ganglion cyst     GERD (gastroesophageal reflux disease) 07/08/2021    Glaucoma     Elevated eye pressure but no glaucoma at this point in time.    H/O degenerative disc disease     Hiatal hernia     Hiatal hernia     History of medical problems 2006    Cataract surgery.    HL (hearing loss)     Treble loss    Hyperlipidemia 12/07/2021    Hypertension     DR ONEAL    Hyperthyroidism 04/17/2023    Hypothyroidism 4/14/23    Blood work from 4/13/23 showed low TSH level.    OAB (overactive bladder)     Obesity 1985    I'm now losing weight on HMR program    Osteoarthritis 2002    Pneumonia 1945    Scoliosis 200w    Discovered with my 2nd back surgeryy.    Sleep apnea     uses BiPAP    Spondylolisthesis  "1989    Surgery    Thyroid nodule 2016    Removed nodules. Now i have more.    Urinary tract infection     Have had 2-3 in my lifetime.    Uterine cancer 1984    Visual impairment 4/4/23    Fuch's corneal disease    Vitamin D deficiency 2023     Past Surgical History:   Procedure Laterality Date    BACK SURGERY  1989/2002/2009    Spondylolisthesis    BREAST BIOPSY  1971    CARDIAC CATHETERIZATION  06/2018    NL DR ONEAL    CHOLECYSTECTOMY  1996    COLONOSCOPY  2016    Clean colon found.    EYE SURGERY Bilateral 2006    Appenbrink, laser    HIP ARTHROPLASTY Right 06/07/2010     UJOSELUIS    HYSTERECTOMY  1984    CARCINOMA IN SITU    JOINT REPLACEMENT  2007    First knee replacement.    LAMINECTOMY  1989    During first back surgery.    LUMBAR DISCECTOMY  2006    DR VOGEL    LUMBAR FUSION  2002    L3/5 DR PAPPAS    LUMBAR LAMINECTOMY  1989    LUMBAR SPINAL FUSION L4/L5 DR BRUCE    LYMPH NODE BIOPSY      Both knees & both hips at different times.    OOPHORECTOMY Right 1996    PAROTIDECTOMY Right 2004    PARTIAL HIP ARTHROPLASTY  03/16/2017    Dr Michel    REVISION TOTAL KNEE ARTHROPLASTY Left 03/16/2017    DR MICHEL    ROTATOR CUFF REPAIR Right 2003    SHOULDER SURGERY  2003    Right rotator cuff.    SPINAL FUSION  1989    Spondylolisthesis    SPINE SURGERY  1989    Spondylolisthesis    SUBTOTAL HYSTERECTOMY  1996    Right ovary removed.    THORACOSCOPY Right 07/29/2020    Procedure: RIGHT VIDEO ASSISTED THORACOSCOPY WITH RIGHT MIDDLE LOBE THERAPUTIC WEDGE RESECTION AND RIGHT MIDDLE LOBECTOMY; HILAR LYMPH NODE DISSECTION;  Surgeon: Jaden Fagan MD;  Location: [facility] MAIN OR;  Service: Cardiothoracic;  Laterality: Right;    THYROID BIOPSY  01/2018    X5    THYROID SURGERY      nodules removed    TOTAL KNEE ARTHROPLASTY  2009    DR MICHEL    TUBAL ABDOMINAL LIGATION Bilateral 1976     /76   Pulse 74   Ht 163.8 cm (64.49\")   Wt 113 kg (249 lb)   SpO2 98%   BMI 42.10 kg/m²   Family History   Problem " Relation Age of Onset    Cancer Mother         Don't know where cancer began.    Kidney disease Mother         Her cancer may have started in her kidneys.    Stroke Sister         brain bleed    Thyroid disease Sister         Removed thyroid.    Allergies Sister     Atrial fibrillation Sister     Allergies Brother     Cancer Daughter         Ewings Sarcoma    Cancer Maternal Aunt         Breast cancer    Arthritis Maternal Grandmother     Alcohol abuse Maternal Grandfather     Cancer Other     Cancer Maternal Aunt         Breast cancer       Current Outpatient Medications:     aspirin 81 MG chewable tablet, Chew 1 tablet Daily., Disp: , Rfl:     atorvastatin (LIPITOR) 10 MG tablet, TAKE 1 TABLET BY MOUTH DAILY, Disp: 90 tablet, Rfl: 3    dilTIAZem CD (CARDIZEM CD) 180 MG 24 hr capsule, TAKE 1 CAPSULE BY MOUTH DAILY, Disp: 90 capsule, Rfl: 1    famotidine (PEPCID) 40 MG tablet, TAKE 1 TABLET BY MOUTH TWICE DAILY AS NEEDED FOR HEARTBURN OR INDIGESTION, Disp: 180 tablet, Rfl: 3    lisinopril-hydrochlorothiazide (PRINZIDE,ZESTORETIC) 20-12.5 MG per tablet, Take 2 tablets by mouth Daily., Disp: 180 tablet, Rfl: 3    methIMAzole (TAPAZOLE) 5 MG tablet, Take 1 tablet by mouth Daily., Disp: 30 tablet, Rfl: 5    Nasacort Allergy 24HR 55 MCG/ACT nasal inhaler, 2 sprays Daily., Disp: , Rfl:     sertraline (Zoloft) 50 MG tablet, Take 1 tablet by mouth Daily., Disp: 90 tablet, Rfl: 1    temazepam (RESTORIL) 30 MG capsule, Take 1 capsule by mouth At Night As Needed for Sleep., Disp: 30 capsule, Rfl: 5    timolol (TIMOPTIC) 0.5 % ophthalmic solution, Administer 1 drop to both eyes 2 (Two) Times a Day., Disp: , Rfl:   Allergies   Allergen Reactions    Beta Adrenergic Blockers Other (See Comments)     Couldn't breathe or move    Bisoprolol Unknown - High Severity    Molds & Smuts Unknown - High Severity     Social History     Socioeconomic History    Marital status:     Number of children: 2    Years of education: 12    Tobacco Use    Smoking status: Former     Current packs/day: 0.00     Average packs/day: 1 pack/day for 46.0 years (46.0 ttl pk-yrs)     Types: Cigarettes     Start date: 1960     Quit date: 2006     Years since quittin.9     Passive exposure: Past    Smokeless tobacco: Never   Vaping Use    Vaping status: Never Used   Substance and Sexual Activity    Alcohol use: Not Currently     Comment: Seldom or if ever these days.    Drug use: Never    Sexual activity: Not Currently     Partners: Male     Birth control/protection: Tubal ligation, Birth control pill, Hysterectomy     Comment: Tubes tied in      Review of Systems   Constitutional: Positive for malaise/fatigue.   Cardiovascular:  Negative for chest pain, dyspnea on exertion, leg swelling and palpitations.   Respiratory:  Positive for shortness of breath. Negative for cough.    Gastrointestinal:  Positive for nausea. Negative for abdominal pain and vomiting.   Neurological:  Negative for dizziness, headaches, light-headedness, numbness and weakness.   All other systems reviewed and are negative.             Objective:     Constitutional:       Appearance: Well-developed.   Eyes:      General: No scleral icterus.     Conjunctiva/sclera: Conjunctivae normal.   HENT:      Head: Normocephalic and atraumatic.   Neck:      Vascular: No carotid bruit or JVD.   Pulmonary:      Effort: Pulmonary effort is normal.      Breath sounds: Normal breath sounds. No wheezing. No rales.   Cardiovascular:      Normal rate. Regular rhythm.   Pulses:     Intact distal pulses.   Abdominal:      General: Bowel sounds are normal.      Palpations: Abdomen is soft.   Musculoskeletal:      Cervical back: Normal range of motion and neck supple. Skin:     General: Skin is warm and dry.      Findings: No rash.   Neurological:      Mental Status: Alert.       Procedures    Lab Review:         MDM    #1 coronary artery disease  Patient had coronary artery calcification with  nonobstructive disease and is currently stable on medications  2.  Hypertension  Patient blood pressure currently stable on diltiazem and lisinopril/hydrochlorothiazide  3.  Hyperlipidemia  Patient on atorvastatin the lipid levels are well within normal limits.    Patient's previous medical records, labs, and EKG were reviewed and discussed with the patient at today's visit.

## 2025-06-12 ENCOUNTER — OFFICE VISIT (OUTPATIENT)
Dept: ENDOCRINOLOGY | Facility: CLINIC | Age: 85
End: 2025-06-12
Payer: MEDICARE

## 2025-06-12 VITALS
HEIGHT: 64 IN | BODY MASS INDEX: 41.48 KG/M2 | WEIGHT: 243 LBS | HEART RATE: 87 BPM | OXYGEN SATURATION: 98 % | DIASTOLIC BLOOD PRESSURE: 70 MMHG | SYSTOLIC BLOOD PRESSURE: 120 MMHG

## 2025-06-12 DIAGNOSIS — E05.90 HYPERTHYROIDISM: ICD-10-CM

## 2025-06-12 DIAGNOSIS — E04.2 MULTIPLE THYROID NODULES: Primary | ICD-10-CM

## 2025-06-12 RX ORDER — METHIMAZOLE 5 MG/1
5 TABLET ORAL DAILY
Qty: 30 TABLET | Refills: 5 | Status: SHIPPED | OUTPATIENT
Start: 2025-06-12 | End: 2026-06-12

## 2025-06-16 ENCOUNTER — PATIENT MESSAGE (OUTPATIENT)
Dept: FAMILY MEDICINE CLINIC | Facility: CLINIC | Age: 85
End: 2025-06-16
Payer: MEDICARE

## 2025-06-16 DIAGNOSIS — Z12.31 ENCOUNTER FOR SCREENING MAMMOGRAM FOR MALIGNANT NEOPLASM OF BREAST: Primary | ICD-10-CM

## 2025-06-16 DIAGNOSIS — Z78.0 POSTMENOPAUSAL STATUS: ICD-10-CM

## 2025-07-03 RX ORDER — LISINOPRIL AND HYDROCHLOROTHIAZIDE 12.5; 2 MG/1; MG/1
2 TABLET ORAL DAILY
Qty: 180 TABLET | Refills: 3 | Status: SHIPPED | OUTPATIENT
Start: 2025-07-03

## 2025-07-03 NOTE — TELEPHONE ENCOUNTER
Rx Refill Note  Requested Prescriptions     Pending Prescriptions Disp Refills    lisinopril-hydrochlorothiazide (PRINZIDE,ZESTORETIC) 20-12.5 MG per tablet [Pharmacy Med Name: LISINOPRIL-HCTZ 20/12.5MG TABLETS] 180 tablet 3     Sig: TAKE 2 TABLETS BY MOUTH DAILY      Last office visit with prescribing clinician: 5/14/2025   Last telemedicine visit with prescribing clinician: Visit date not found   Next office visit with prescribing clinician: 5/14/2026                         Would you like a call back once the refill request has been completed: [] Yes [] No    If the office needs to give you a call back, can they leave a voicemail: [] Yes [] No    Lisa Tristan MA  07/03/25, 08:27 EDT

## 2025-07-04 RX ORDER — SERTRALINE HYDROCHLORIDE 25 MG/1
25 TABLET, FILM COATED ORAL DAILY
Qty: 90 TABLET | Refills: 1 | OUTPATIENT
Start: 2025-07-04

## 2025-07-05 ENCOUNTER — PATIENT MESSAGE (OUTPATIENT)
Dept: FAMILY MEDICINE CLINIC | Facility: CLINIC | Age: 85
End: 2025-07-05
Payer: MEDICARE

## 2025-07-05 DIAGNOSIS — F51.01 PRIMARY INSOMNIA: ICD-10-CM

## 2025-07-07 RX ORDER — ATORVASTATIN CALCIUM 10 MG/1
10 TABLET, FILM COATED ORAL DAILY
Qty: 90 TABLET | Refills: 3 | Status: SHIPPED | OUTPATIENT
Start: 2025-07-07

## 2025-07-07 RX ORDER — TEMAZEPAM 30 MG/1
30 CAPSULE ORAL NIGHTLY PRN
Qty: 30 CAPSULE | Refills: 5 | Status: SHIPPED | OUTPATIENT
Start: 2025-07-07

## 2025-07-07 RX ORDER — METHIMAZOLE 5 MG/1
5 TABLET ORAL DAILY
Qty: 30 TABLET | Refills: 5 | Status: SHIPPED | OUTPATIENT
Start: 2025-07-07 | End: 2026-07-07

## 2025-07-09 DIAGNOSIS — F51.01 PRIMARY INSOMNIA: ICD-10-CM

## 2025-07-11 RX ORDER — TEMAZEPAM 30 MG/1
30 CAPSULE ORAL NIGHTLY PRN
Qty: 30 CAPSULE | Refills: 5 | OUTPATIENT
Start: 2025-07-11

## (undated) DEVICE — PK CHST THORACOSCOPY 50

## (undated) DEVICE — 3M™ STERI-STRIP™ BLEND TONE SKIN CLOSURES, B1557, TAN, 1/2 IN X 4 IN (12MM X 100MM), 6 STRIPS/ENVELOPE: Brand: 3M™ STERI-STRIP™

## (undated) DEVICE — SOL IRRIG H2O 1000ML STRL

## (undated) DEVICE — SUT VIC 3/0 SH 27IN J416H

## (undated) DEVICE — OASIS DRAIN, SINGLE, INLINE & ATS COMPATIBLE: Brand: OASIS

## (undated) DEVICE — ELECTRD BLD EZ CLN MOD XLNG 2.75IN

## (undated) DEVICE — PK PROC TURNOVER

## (undated) DEVICE — GLV SURG SIGNATURE ESSENTIAL PF LTX SZ8.5

## (undated) DEVICE — DRSNG WND BORDR/ADHS NONADHR/GZ LF 4X4IN STRL

## (undated) DEVICE — ADHS LIQ MASTISOL 2/3ML

## (undated) DEVICE — CUFF SCD HEMOFORCE SEQ CALF STD MD

## (undated) DEVICE — UNIVERSAL STAPLER: Brand: ENDO GIA ULTRA

## (undated) DEVICE — TRAP,MUCUS SPECIMEN, 80CC: Brand: MEDLINE

## (undated) DEVICE — ORG INST STRIP/TS ADHS 2X10IN YEL STRL

## (undated) DEVICE — ENDOPOUCH RETRIEVER SPECIMEN RETRIEVAL BAGS: Brand: ENDOPOUCH RETRIEVER

## (undated) DEVICE — DRSNG WND GZ PAD BORDERED 4X8IN STRL